# Patient Record
Sex: FEMALE | Race: WHITE | NOT HISPANIC OR LATINO | Employment: FULL TIME | URBAN - METROPOLITAN AREA
[De-identification: names, ages, dates, MRNs, and addresses within clinical notes are randomized per-mention and may not be internally consistent; named-entity substitution may affect disease eponyms.]

---

## 2017-01-12 ENCOUNTER — OFFICE VISIT (OUTPATIENT)
Dept: ENDOCRINOLOGY | Age: 57
End: 2017-01-12

## 2017-01-12 VITALS
HEIGHT: 69 IN | RESPIRATION RATE: 16 BRPM | BODY MASS INDEX: 30.13 KG/M2 | WEIGHT: 203.4 LBS | SYSTOLIC BLOOD PRESSURE: 122 MMHG | DIASTOLIC BLOOD PRESSURE: 78 MMHG

## 2017-01-12 DIAGNOSIS — E55.9 VITAMIN D DEFICIENCY: ICD-10-CM

## 2017-01-12 DIAGNOSIS — E03.9 ACQUIRED HYPOTHYROIDISM: ICD-10-CM

## 2017-01-12 DIAGNOSIS — B35.1 ONYCHOMYCOSIS: ICD-10-CM

## 2017-01-12 DIAGNOSIS — E78.5 DYSLIPIDEMIA: ICD-10-CM

## 2017-01-12 DIAGNOSIS — E11.69 TYPE 2 DIABETES MELLITUS WITH OTHER SPECIFIED COMPLICATION (HCC): Primary | ICD-10-CM

## 2017-01-12 DIAGNOSIS — E79.0 HYPERURICEMIA: ICD-10-CM

## 2017-01-12 PROCEDURE — 99215 OFFICE O/P EST HI 40 MIN: CPT | Performed by: INTERNAL MEDICINE

## 2017-01-12 RX ORDER — FENOFIBRATE 90 MG/1
CAPSULE ORAL
Qty: 30 CAPSULE | Refills: 11 | Status: SHIPPED | OUTPATIENT
Start: 2017-01-12 | End: 2017-01-17 | Stop reason: SINTOL

## 2017-01-12 RX ORDER — COLESEVELAM HYDROCHLORIDE 3.75 G/1
3.75 POWDER, FOR SUSPENSION ORAL DAILY
Qty: 90 EACH | Refills: 3 | Status: SHIPPED | OUTPATIENT
Start: 2017-01-12 | End: 2017-01-16 | Stop reason: SINTOL

## 2017-01-12 RX ORDER — ALLOPURINOL 100 MG/1
200 TABLET ORAL DAILY
Qty: 180 TABLET | Refills: 3 | Status: SHIPPED | OUTPATIENT
Start: 2017-01-12 | End: 2018-02-26 | Stop reason: SDUPTHER

## 2017-01-12 NOTE — LETTER
January 12, 2017     No Known Provider  [facility] [address]    Patient: Shanika Velez   YOB: 1960   Date of Visit: 1/12/2017       Dear  Provider:    Shanika Velez was in my office today. Below are the relevant portions of my assessment and plan of care.      Diagnoses and all orders for this visit:    Type 2 diabetes mellitus with other specified complication  -     T3, Free; Future  -     T4 & TSH ([facility]); Future  -     T4, Free; Future  -     Uric Acid; Future  -     Vitamin D 25 Hydroxy; Future  -     Comprehensive Metabolic Panel; Future  -     C-Peptide; Future  -     Hemoglobin A1c; Future  -     Lipid Panel; Future  -     MicroAlbumin, Urine, Random; Future  -     Ambulatory Referral to Nutrition Services  -     Ambulatory Referral to Podiatry  -     Duplex Carotid - Left Ultrasound CAR; Future  -     Duplex Carotid - Right Ultrasound CAR; Future    Acquired hypothyroidism  -     T3, Free; Future  -     T4 & TSH ([facility]); Future  -     T4, Free; Future  -     Uric Acid; Future  -     Vitamin D 25 Hydroxy; Future  -     Comprehensive Metabolic Panel; Future  -     C-Peptide; Future  -     Hemoglobin A1c; Future  -     Lipid Panel; Future  -     MicroAlbumin, Urine, Random; Future  -     allopurinol (ZYLOPRIM) 100 MG tablet; Take 2 tablets by mouth Daily.    Vitamin D deficiency  -     T3, Free; Future  -     T4 & TSH ([facility]); Future  -     T4, Free; Future  -     Uric Acid; Future  -     Vitamin D 25 Hydroxy; Future  -     Comprehensive Metabolic Panel; Future  -     C-Peptide; Future  -     Hemoglobin A1c; Future  -     Lipid Panel; Future  -     MicroAlbumin, Urine, Random; Future  -     allopurinol (ZYLOPRIM) 100 MG tablet; Take 2 tablets by mouth Daily.    Dyslipidemia  -     T3, Free; Future  -     T4 & TSH ([facility]); Future  -     T4, Free; Future  -     Uric Acid; Future  -     Vitamin D 25 Hydroxy; Future  -     Comprehensive Metabolic Panel; Future  -      C-Peptide; Future  -     Hemoglobin A1c; Future  -     Lipid Panel; Future  -     MicroAlbumin, Urine, Random; Future  -     Duplex Carotid - Left Ultrasound CAR; Future  -     Duplex Carotid - Right Ultrasound CAR; Future    Hyperuricemia  -     T3, Free; Future  -     T4 & TSH ([facility]); Future  -     T4, Free; Future  -     Uric Acid; Future  -     Vitamin D 25 Hydroxy; Future  -     Comprehensive Metabolic Panel; Future  -     C-Peptide; Future  -     Hemoglobin A1c; Future  -     Lipid Panel; Future  -     MicroAlbumin, Urine, Random; Future    Onychomycosis  -     Ambulatory Referral to Podiatry    Other orders  -     Fenofibrate Micronized (ANTARA) 90 MG capsule; 1 capsule by mouth daily  -     colesevelam (WELCHOL) 3.75 G pack pack; Take 1 packet by mouth Daily.                  If you have questions, please do not hesitate to call me. I look forward to following Shanika along with you.         Sincerely,        Parth Jo MD        CC: No Recipients

## 2017-01-12 NOTE — PROGRESS NOTES
Subjective   Shanika Velez is a 56 y.o. female seen for follow up for DM2, hypothyroidism, dyslipidemia, vit d deficiency, lab review. She is checking her BG twice a day. No BG readings. She would like to see a nutritionist to discuss a diet plan.     History of Present Illness this is a 56-year-old female known patient with type II diabetes hypertension dyslipidemia and hypothyroidism with vitamin D deficiency.  Over the last 6 months she has had no significant health problems for which to go to the emergency room or hospital.  She is frustrated by virtue of the fact that between her diabetes and the her diverticulitis she is very confused about the choice of foods and is requesting to have a meeting with a nutritionist.  Additionally her sister who also had diabetes recently  and they found near total carotid artery block on one side and total block on the other side.  She is very concerned about this as her brother who also is alive has carotid artery insufficiency.    The following portions of the patient's history were reviewed and updated as appropriate: allergies, current medications, past family history, past medical history, past social history, past surgical history and problem list.    Review of Systems   Constitutional: Negative.  Negative for fatigue.   HENT: Negative.    Eyes: Negative.    Respiratory: Negative.    Cardiovascular: Negative.  Negative for chest pain.   Gastrointestinal: Negative.    Endocrine: Positive for polydipsia and polyuria.   Genitourinary: Negative.    Musculoskeletal: Negative.    Skin: Negative.  Negative for pallor.   Allergic/Immunologic: Negative.    Neurological: Negative.  Negative for dizziness, tremors, seizures, speech difficulty, weakness and headaches.   Hematological: Negative.    Psychiatric/Behavioral: Negative.  Negative for confusion. The patient is not nervous/anxious.        Objective   Physical Exam   Constitutional: She is oriented to person, place, and  time. She appears well-developed and well-nourished. No distress.   HENT:   Head: Normocephalic and atraumatic.   Right Ear: External ear normal.   Left Ear: External ear normal.   Nose: Nose normal.   Mouth/Throat: Oropharynx is clear and moist. No oropharyngeal exudate.   Eyes: EOM are normal. Pupils are equal, round, and reactive to light. Right eye exhibits no discharge. Left eye exhibits no discharge. No scleral icterus.   Neck: Trachea normal, normal range of motion and full passive range of motion without pain. Neck supple. No JVD present. No tracheal tenderness present. Carotid bruit is not present. No tracheal deviation, no edema and no erythema present. No thyroid mass and no thyromegaly present.   Cardiovascular: Normal rate, regular rhythm, normal heart sounds and intact distal pulses.  Exam reveals no gallop and no friction rub.    No murmur heard.  Pulmonary/Chest: Effort normal and breath sounds normal. No stridor. No respiratory distress. She has no wheezes. She has no rales. She exhibits no tenderness.   Abdominal: Soft. Bowel sounds are normal. She exhibits no distension and no mass. There is no tenderness. There is no rebound and no guarding. No hernia.   Musculoskeletal: Normal range of motion. She exhibits no edema or deformity.   Bilateral bunions in both feet.  Also there is a significant peak toenail fungus infection and deformity.   Lymphadenopathy:     She has no cervical adenopathy.   Neurological: She is alert and oriented to person, place, and time. She has normal reflexes. She displays normal reflexes. No cranial nerve deficit. She exhibits normal muscle tone. Coordination normal.   Skin: Skin is warm and dry. No rash noted. She is not diaphoretic. No erythema. No pallor.   Psychiatric: She has a normal mood and affect. Her behavior is normal. Judgment and thought content normal.   Nursing note and vitals reviewed.        Assessment/Plan   Diagnoses and all orders for this  visit:    Type 2 diabetes mellitus with other specified complication  -     T3, Free; Future  -     T4 & TSH ([facility]); Future  -     T4, Free; Future  -     Uric Acid; Future  -     Vitamin D 25 Hydroxy; Future  -     Comprehensive Metabolic Panel; Future  -     C-Peptide; Future  -     Hemoglobin A1c; Future  -     Lipid Panel; Future  -     MicroAlbumin, Urine, Random; Future  -     Ambulatory Referral to Nutrition Services  -     Ambulatory Referral to Podiatry  -     Duplex Carotid - Left Ultrasound CAR; Future  -     Duplex Carotid - Right Ultrasound CAR; Future    Acquired hypothyroidism  -     T3, Free; Future  -     T4 & TSH ([facility]); Future  -     T4, Free; Future  -     Uric Acid; Future  -     Vitamin D 25 Hydroxy; Future  -     Comprehensive Metabolic Panel; Future  -     C-Peptide; Future  -     Hemoglobin A1c; Future  -     Lipid Panel; Future  -     MicroAlbumin, Urine, Random; Future  -     allopurinol (ZYLOPRIM) 100 MG tablet; Take 2 tablets by mouth Daily.    Vitamin D deficiency  -     T3, Free; Future  -     T4 & TSH ([facility]); Future  -     T4, Free; Future  -     Uric Acid; Future  -     Vitamin D 25 Hydroxy; Future  -     Comprehensive Metabolic Panel; Future  -     C-Peptide; Future  -     Hemoglobin A1c; Future  -     Lipid Panel; Future  -     MicroAlbumin, Urine, Random; Future  -     allopurinol (ZYLOPRIM) 100 MG tablet; Take 2 tablets by mouth Daily.    Dyslipidemia  -     T3, Free; Future  -     T4 & TSH ([facility]); Future  -     T4, Free; Future  -     Uric Acid; Future  -     Vitamin D 25 Hydroxy; Future  -     Comprehensive Metabolic Panel; Future  -     C-Peptide; Future  -     Hemoglobin A1c; Future  -     Lipid Panel; Future  -     MicroAlbumin, Urine, Random; Future  -     Duplex Carotid - Left Ultrasound CAR; Future  -     Duplex Carotid - Right Ultrasound CAR; Future    Hyperuricemia  -     T3, Free; Future  -     T4 & TSH ([facility]); Future  -     T4, Free; Future  -     Uric  Acid; Future  -     Vitamin D 25 Hydroxy; Future  -     Comprehensive Metabolic Panel; Future  -     C-Peptide; Future  -     Hemoglobin A1c; Future  -     Lipid Panel; Future  -     MicroAlbumin, Urine, Random; Future    Onychomycosis  -     Ambulatory Referral to Podiatry    Other orders  -     Fenofibrate Micronized (ANTARA) 90 MG capsule; 1 capsule by mouth daily  -     colesevelam (WELCHOL) 3.75 G pack pack; Take 1 packet by mouth Daily.               In summary I saw and examined this 56-year-old female for above-mentioned problems.  I reviewed her laboratory evaluation of 12/22/2016 and provided her with a hard copy of it.  She is overall clinically and metabolically stable however her triglycerides are elevated and for that I am starting her on Antara 90 mg daily and her uric acid is is still slightly elevated and we will go ahead and increase her allopurinol to 200 mg daily.  We will also switch her from cholesterol 2 WelChol for better lipid control as well as diabetic control as well.  We will make a referral to a nutritionist as well as podiatrist and also arrange for her to have carotid ultrasound studies.  She will see Ms. Lexi Muñiz in 4 months and myself in 8 months or sooner if needed with laboratory evaluation prior to each office visit.  This office visit including 50% of the time being spent on patient and counseling exceeded 40 minutes.

## 2017-01-12 NOTE — LETTER
January 12, 2017     No Known Provider  [facility] [address]    Patient: Shanika Velez   YOB: 1960   Date of Visit: 1/12/2017       Dear  Provider:    Thank you for referring Shanika Velez to me for evaluation. Below are the relevant portions of my assessment and plan of care.      Diagnoses and all orders for this visit:    Type 2 diabetes mellitus with other specified complication  -     T3, Free; Future  -     T4 & TSH ([facility]); Future  -     T4, Free; Future  -     Uric Acid; Future  -     Vitamin D 25 Hydroxy; Future  -     Comprehensive Metabolic Panel; Future  -     C-Peptide; Future  -     Hemoglobin A1c; Future  -     Lipid Panel; Future  -     MicroAlbumin, Urine, Random; Future  -     Ambulatory Referral to Nutrition Services  -     Ambulatory Referral to Podiatry  -     Duplex Carotid - Left Ultrasound CAR; Future  -     Duplex Carotid - Right Ultrasound CAR; Future    Acquired hypothyroidism  -     T3, Free; Future  -     T4 & TSH ([facility]); Future  -     T4, Free; Future  -     Uric Acid; Future  -     Vitamin D 25 Hydroxy; Future  -     Comprehensive Metabolic Panel; Future  -     C-Peptide; Future  -     Hemoglobin A1c; Future  -     Lipid Panel; Future  -     MicroAlbumin, Urine, Random; Future  -     allopurinol (ZYLOPRIM) 100 MG tablet; Take 2 tablets by mouth Daily.    Vitamin D deficiency  -     T3, Free; Future  -     T4 & TSH ([facility]); Future  -     T4, Free; Future  -     Uric Acid; Future  -     Vitamin D 25 Hydroxy; Future  -     Comprehensive Metabolic Panel; Future  -     C-Peptide; Future  -     Hemoglobin A1c; Future  -     Lipid Panel; Future  -     MicroAlbumin, Urine, Random; Future  -     allopurinol (ZYLOPRIM) 100 MG tablet; Take 2 tablets by mouth Daily.    Dyslipidemia  -     T3, Free; Future  -     T4 & TSH ([facility]); Future  -     T4, Free; Future  -     Uric Acid; Future  -     Vitamin D 25 Hydroxy; Future  -     Comprehensive Metabolic  Panel; Future  -     C-Peptide; Future  -     Hemoglobin A1c; Future  -     Lipid Panel; Future  -     MicroAlbumin, Urine, Random; Future  -     Duplex Carotid - Left Ultrasound CAR; Future  -     Duplex Carotid - Right Ultrasound CAR; Future    Hyperuricemia  -     T3, Free; Future  -     T4 & TSH ([facility]); Future  -     T4, Free; Future  -     Uric Acid; Future  -     Vitamin D 25 Hydroxy; Future  -     Comprehensive Metabolic Panel; Future  -     C-Peptide; Future  -     Hemoglobin A1c; Future  -     Lipid Panel; Future  -     MicroAlbumin, Urine, Random; Future    Onychomycosis  -     Ambulatory Referral to Podiatry    Other orders  -     Fenofibrate Micronized (ANTARA) 90 MG capsule; 1 capsule by mouth daily  -     colesevelam (WELCHOL) 3.75 G pack pack; Take 1 packet by mouth Daily.                          If you have questions, please do not hesitate to call me. I look forward to following Shanika along with you.         Sincerely,        Parth Jo MD        CC: No Recipients

## 2017-01-12 NOTE — MR AVS SNAPSHOT
Shanika Velez   1/12/2017 9:20 AM   Office Visit    Dept Phone:  [phone]   Encounter #:  [id number]    Provider:  Parth Jo MD   Department:  [facility] ENDOCRINOLOGY                Your Full Care Plan              Today's Medication Changes          These changes are accurate as of: 1/12/17 10:09 AM.  If you have any questions, ask your nurse or doctor.               New Medication(s)Ordered:     colesevelam 3.75 G pack pack   Commonly known as:  WELCHOL   Take 1 packet by mouth Daily.   Started by:  Parth Jo MD       Fenofibrate Micronized 90 MG capsule   Commonly known as:  ANTARA   1 capsule by mouth daily   Started by:  Parth Jo MD         Medication(s)that have changed:     allopurinol 100 MG tablet   Commonly known as:  ZYLOPRIM   Take 2 tablets by mouth Daily.   What changed:  how much to take   Changed by:  Parth Jo MD         Stop taking medication(s)listed here:     colestipol 1 G tablet   Commonly known as:  COLESTID   Stopped by:  Parth Jo MD                Where to Get Your Medications      These medications were sent to [id number] - [place], [place] - [address] AT [facility] - [phone] [facility] [phone]   [address] [place] [address]     Phone:  [phone]     allopurinol 100 MG tablet    colesevelam 3.75 G pack pack    Fenofibrate Micronized 90 MG capsule                  Your Updated Medication List          This list is accurate as of: 1/12/17 10:09 AM.  Always use your most recent med list.                ACCU-CHEK FASTCLIX LANCETS misc   Test 2 times daily       ALLEGRA ALLERGY 180 MG tablet   Generic drug:  fexofenadine       allopurinol 100 MG tablet   Commonly known as:  ZYLOPRIM   Take 2 tablets by mouth Daily.       colesevelam 3.75 G pack pack   Commonly known as:  WELCHOL   Take 1 packet by mouth Daily.       Fenofibrate Micronized 90 MG capsule   Commonly  known as:  ANTARA   1 capsule by mouth daily       fluticasone 50 MCG/ACT nasal spray   Commonly known as:  FLONASE       glucose blood test strip   Commonly known as:  ACCU-CHEK JACKIE   Test twice daily       meloxicam 7.5 MG tablet   Commonly known as:  MOBIC       metFORMIN 1000 MG tablet   Commonly known as:  GLUCOPHAGE       * montelukast 10 MG tablet   Commonly known as:  SINGULAIR       * montelukast 10 MG tablet   Commonly known as:  SINGULAIR   TAKE 1 TABLET IN THE EVENING.       pantoprazole 40 MG EC tablet   Commonly known as:  PROTONIX       * SYNTHROID 112 MCG tablet   Generic drug:  levothyroxine       * SYNTHROID 112 MCG tablet   Generic drug:  levothyroxine   One tablet every day       * ergocalciferol 32129 UNITS capsule   Commonly known as:  ERGOCALCIFEROL       * vitamin D 42966 UNITS capsule capsule   Commonly known as:  ERGOCALCIFEROL   TAKE ONE CAPSULE 3 TIMES WEEKLY       * Notice:  This list has 6 medication(s) that are the same as other medications prescribed for you. Read the directions carefully, and ask your doctor or other care provider to review them with you.            We Performed the Following     Ambulatory Referral to Nutrition Services     Ambulatory Referral to Podiatry       You Were Diagnosed With        Codes Comments    Type 2 diabetes mellitus with other specified complication    -  Primary ICD-10-CM: E11.69  ICD-9-CM: 250.80     Acquired hypothyroidism     ICD-10-CM: E03.9  ICD-9-CM: 244.9     Vitamin D deficiency     ICD-10-CM: E55.9  ICD-9-CM: 268.9     Dyslipidemia     ICD-10-CM: E78.5  ICD-9-CM: 272.4     Hyperuricemia     ICD-10-CM: E79.0  ICD-9-CM: 790.6     Onychomycosis     ICD-10-CM: B35.1  ICD-9-CM: 110.1       Instructions     None    Patient Instructions History      Upcoming Appointments     Visit Type Date Time Department    OFFICE VISIT 1/12/2017  9:20 AM [facility]    [facility] 5/2/2017  3:30 PM [facility]    OFFICE VISIT 5/16/2017  3:40 PM  [facility]    [facility] 9/5/2017  3:30 PM [facility]    OFFICE VISIT 9/18/2017  2:20 PM [facility]      MyChart Signup     Our records indicate that you have an active [facility] [employer] account.    You can view your After Visit Summary by going to [email or web address] and logging in with your [employer] username and password.  If you don't have a [employer] username and password but a parent or guardian has access to your record, the parent or guardian should login with their own [employer] username and password and access your record to view the After Visit Summary.    If you have questions, you can email [email or web address] or call [phone] to talk to our [employer] staff.  Remember, [employer] is NOT to be used for urgent needs.  For medical emergencies, dial 911.               Other Info from Your Visit           Your Appointments     May 02, 2017  3:30 PM EDT   [facility] with [facility], [facility]   [facility] ENDOCRINOLOGY (--)    [address] [place], [place] [id number]   [phone]            May 16, 2017  3:40 PM EDT   Office Visit with YUE Davis   [facility] ENDOCRINOLOGY (--)    [address] [place], [place] [id number]   [phone]           Arrive 15 minutes prior to appointment.            Sep 05, 2017  3:30 PM EDT   [facility], [facility]   [facility] ENDOCRINOLOGY (--)    [address] [place] [address]   [phone]            Sep 18, 2017  2:20 PM EDT   Office Visit with Parth Jo MD   [facility] ENDOCRINOLOGY (--)    [address] [place], [place] [address]   [phone]           Arrive 15 minutes prior to appointment.              Allergies     Codeine      Fluoxetine Hcl      Penicillins      Tetracycline      Tetracyclines & Related        Vital Signs     Blood Pressure Respirations Height Weight  "Body Mass Index Smoking Status    122/78 16 69\" (175.3 cm) 203 lb 6.4 oz (92.3 kg) 30.04 kg/m2 Former Smoker      Problems and Diagnoses Noted     Dyslipidemia    Elevated blood uric acid level    Underactive thyroid    Type 2 diabetes    Vitamin D deficiency    Onychomycosis            "

## 2017-01-13 DIAGNOSIS — G45.1 CAROTID INSUFFICIENCY: Primary | ICD-10-CM

## 2017-01-14 DIAGNOSIS — E03.9 ACQUIRED HYPOTHYROIDISM: ICD-10-CM

## 2017-01-14 DIAGNOSIS — E55.9 VITAMIN D DEFICIENCY: ICD-10-CM

## 2017-01-16 RX ORDER — PANTOPRAZOLE SODIUM 40 MG/1
TABLET, DELAYED RELEASE ORAL
Qty: 90 TABLET | Refills: 3 | Status: SHIPPED | OUTPATIENT
Start: 2017-01-16 | End: 2017-12-30 | Stop reason: SDUPTHER

## 2017-01-16 RX ORDER — EZETIMIBE 10 MG/1
10 TABLET ORAL DAILY
Qty: 30 TABLET | Refills: 11 | Status: SHIPPED | OUTPATIENT
Start: 2017-01-16 | End: 2017-04-12 | Stop reason: SDUPTHER

## 2017-01-16 RX ORDER — MELOXICAM 7.5 MG/1
TABLET ORAL
Qty: 60 TABLET | Refills: 3 | Status: SHIPPED | OUTPATIENT
Start: 2017-01-16 | End: 2017-09-09 | Stop reason: SDUPTHER

## 2017-01-17 RX ORDER — ICOSAPENT ETHYL 1000 MG/1
4 CAPSULE ORAL DAILY
Qty: 120 CAPSULE | Refills: 5 | Status: SHIPPED | OUTPATIENT
Start: 2017-01-17 | End: 2017-07-27 | Stop reason: SDUPTHER

## 2017-01-19 ENCOUNTER — TELEPHONE (OUTPATIENT)
Dept: ENDOCRINOLOGY | Age: 57
End: 2017-01-19

## 2017-01-19 NOTE — TELEPHONE ENCOUNTER
----- Message from Parth Jo MD sent at 1/17/2017  4:47 PM EST -----  Contact: PATIENT  So I am going to discontinue Antara and I am going to start her on Vascepa capsules twice daily.  Her triglyceride was high and this is for that.  She is well come to  some samples and a co-pay reduction card.  ----- Message -----     From: Sandra Vigil MA     Sent: 1/17/2017   3:27 PM       To: Parth Jo MD    So should she be taking Antara along with with the Zetia and Colestipol. She also states that after starting the Antara last week she has been having some itching.   ----- Message -----     From: Parth Jo MD     Sent: 1/17/2017  10:06 AM       To: Sandra Vigil MA    It is okay.  But she also needs to take Zetia 10 mg a day because her high levels of cholesterol was when she was on colestipol.  ----- Message -----     From: Sandra Vigil MA     Sent: 1/16/2017  12:54 PM       To: Parth Jo MD     Patient was taking colestipol 1gm one tablet 3 times a day. She still has this medication at home and is wanting to go back to this before paying for a new medication. Is this ok?  ----- Message -----     From: Parth Jo MD     Sent: 1/16/2017  11:40 AM       To: Sandra Vigil MA    I went ahead and switched her to Zetia 10 mg daily.  ----- Message -----     From: Sandra Vigil MA     Sent: 1/16/2017  10:38 AM       To: Parth Jo MD        ----- Message -----     From: Devorah Toledo     Sent: 1/16/2017   9:48 AM       To: Sandra Vigil MA    CHANGED A MED AND MADE PT SICK CAN NOT TAKE    colesevelam (WELCHOL) 3.75 G pack pack    STARTED TAKING IT YESTERDAY 01/15/2017    PLEASE CALL PT            Left patient a voicemail to call our office 01/19/2017 8:10AM

## 2017-01-23 ENCOUNTER — HOSPITAL ENCOUNTER (OUTPATIENT)
Dept: CARDIOLOGY | Facility: HOSPITAL | Age: 57
Discharge: HOME | End: 2017-01-23
Attending: INTERNAL MEDICINE | Admitting: INTERNAL MEDICINE

## 2017-01-23 VITALS
BODY MASS INDEX: 30.16 KG/M2 | WEIGHT: 199 LBS | HEART RATE: 80 BPM | DIASTOLIC BLOOD PRESSURE: 66 MMHG | HEIGHT: 68 IN | SYSTOLIC BLOOD PRESSURE: 130 MMHG

## 2017-01-23 DIAGNOSIS — G45.1 CAROTID INSUFFICIENCY: ICD-10-CM

## 2017-01-23 LAB
BH CV VAS BP LEFT ARM: NORMAL MMHG
BH CV VAS BP RIGHT ARM: NORMAL MMHG
BH CV XLRA MEAS LEFT ICA/CCA RATIO: 1.25
BH CV XLRA MEAS LEFT MID CCA PSV: NORMAL CM/SEC
BH CV XLRA MEAS LEFT MID ICA PSV: NORMAL CM/SEC
BH CV XLRA MEAS LEFT PROX ECA PSV: NORMAL CM/SEC
BH CV XLRA MEAS RIGHT ICA/CCA RATIO: 1.34
BH CV XLRA MEAS RIGHT MID CCA PSV: NORMAL CM/SEC
BH CV XLRA MEAS RIGHT MID ICA PSV: NORMAL CM/SEC
BH CV XLRA MEAS RIGHT PROX ECA PSV: NORMAL CM/SEC

## 2017-01-23 PROCEDURE — 93799 UNLISTED CV SVC/PROCEDURE: CPT

## 2017-01-31 ENCOUNTER — OFFICE (OUTPATIENT)
Dept: URBAN - METROPOLITAN AREA CLINIC 75 | Facility: CLINIC | Age: 57
End: 2017-01-31

## 2017-02-07 ENCOUNTER — APPOINTMENT (OUTPATIENT)
Dept: DIABETES SERVICES | Facility: HOSPITAL | Age: 57
End: 2017-02-07
Attending: INTERNAL MEDICINE

## 2017-02-13 ENCOUNTER — OFFICE (OUTPATIENT)
Dept: URBAN - METROPOLITAN AREA CLINIC 2 | Facility: CLINIC | Age: 57
End: 2017-02-13

## 2017-02-13 VITALS
HEIGHT: 68 IN | SYSTOLIC BLOOD PRESSURE: 128 MMHG | DIASTOLIC BLOOD PRESSURE: 80 MMHG | WEIGHT: 203 LBS | HEART RATE: 72 BPM

## 2017-02-13 DIAGNOSIS — K21.9 GASTRO-ESOPHAGEAL REFLUX DISEASE WITHOUT ESOPHAGITIS: ICD-10-CM

## 2017-02-13 DIAGNOSIS — K91.5 POSTCHOLECYSTECTOMY SYNDROME: ICD-10-CM

## 2017-02-13 PROCEDURE — 99214 OFFICE O/P EST MOD 30 MIN: CPT | Performed by: INTERNAL MEDICINE

## 2017-03-21 DIAGNOSIS — E03.9 ACQUIRED HYPOTHYROIDISM: ICD-10-CM

## 2017-03-21 DIAGNOSIS — E55.9 VITAMIN D DEFICIENCY: ICD-10-CM

## 2017-04-12 RX ORDER — EZETIMIBE 10 MG/1
10 TABLET ORAL DAILY
Qty: 90 TABLET | Refills: 3 | Status: SHIPPED | OUTPATIENT
Start: 2017-04-12 | End: 2017-07-27

## 2017-04-25 DIAGNOSIS — E03.9 ACQUIRED HYPOTHYROIDISM: ICD-10-CM

## 2017-04-25 DIAGNOSIS — E11.69 TYPE 2 DIABETES MELLITUS WITH OTHER SPECIFIED COMPLICATION (HCC): ICD-10-CM

## 2017-04-25 DIAGNOSIS — E78.5 DYSLIPIDEMIA: ICD-10-CM

## 2017-04-25 DIAGNOSIS — E79.0 HYPERURICEMIA: ICD-10-CM

## 2017-04-25 DIAGNOSIS — E55.9 VITAMIN D DEFICIENCY: Primary | ICD-10-CM

## 2017-05-02 ENCOUNTER — LAB (OUTPATIENT)
Dept: ENDOCRINOLOGY | Age: 57
End: 2017-05-02

## 2017-05-02 DIAGNOSIS — E78.5 DYSLIPIDEMIA: ICD-10-CM

## 2017-05-02 DIAGNOSIS — E79.0 HYPERURICEMIA: ICD-10-CM

## 2017-05-02 DIAGNOSIS — E03.9 ACQUIRED HYPOTHYROIDISM: ICD-10-CM

## 2017-05-02 DIAGNOSIS — E11.69 TYPE 2 DIABETES MELLITUS WITH OTHER SPECIFIED COMPLICATION (HCC): ICD-10-CM

## 2017-05-02 DIAGNOSIS — E55.9 VITAMIN D DEFICIENCY: ICD-10-CM

## 2017-05-03 ENCOUNTER — RESULTS ENCOUNTER (OUTPATIENT)
Dept: ENDOCRINOLOGY | Age: 57
End: 2017-05-03

## 2017-05-03 DIAGNOSIS — E79.0 HYPERURICEMIA: ICD-10-CM

## 2017-05-03 DIAGNOSIS — E78.5 DYSLIPIDEMIA: ICD-10-CM

## 2017-05-03 DIAGNOSIS — E55.9 VITAMIN D DEFICIENCY: ICD-10-CM

## 2017-05-03 DIAGNOSIS — E11.69 TYPE 2 DIABETES MELLITUS WITH OTHER SPECIFIED COMPLICATION (HCC): ICD-10-CM

## 2017-05-03 DIAGNOSIS — E03.9 ACQUIRED HYPOTHYROIDISM: ICD-10-CM

## 2017-05-08 LAB
25(OH)D3+25(OH)D2 SERPL-MCNC: 35.5 NG/ML (ref 30–100)
ALBUMIN SERPL-MCNC: 4.4 G/DL (ref 3.5–5.2)
ALBUMIN/GLOB SERPL: 1.7 G/DL
ALP SERPL-CCNC: 65 U/L (ref 39–117)
ALT SERPL-CCNC: 25 U/L (ref 1–33)
AST SERPL-CCNC: 47 U/L (ref 1–32)
BILIRUB SERPL-MCNC: 0.3 MG/DL (ref 0.1–1.2)
BUN SERPL-MCNC: 14 MG/DL (ref 6–20)
BUN/CREAT SERPL: 19.4 (ref 7–25)
C PEPTIDE SERPL-MCNC: 7.9 NG/ML (ref 1.1–4.4)
CALCIUM SERPL-MCNC: 9.7 MG/DL (ref 8.6–10.5)
CHLORIDE SERPL-SCNC: 103 MMOL/L (ref 98–107)
CHOLEST SERPL-MCNC: 173 MG/DL (ref 0–200)
CO2 SERPL-SCNC: 22.1 MMOL/L (ref 22–29)
CREAT SERPL-MCNC: 0.72 MG/DL (ref 0.57–1)
FT4I SERPL CALC-MCNC: 2.8 (ref 1.2–4.9)
GLOBULIN SER CALC-MCNC: 2.6 GM/DL
GLUCOSE SERPL-MCNC: 99 MG/DL (ref 65–99)
HBA1C MFR BLD: 6 % (ref 4.8–5.6)
HDLC SERPL-MCNC: 37 MG/DL (ref 40–60)
LDLC SERPL CALC-MCNC: 85 MG/DL (ref 0–100)
MICROALBUMIN UR-MCNC: 6.5 UG/ML
POTASSIUM SERPL-SCNC: 4.3 MMOL/L (ref 3.5–5.2)
PROT SERPL-MCNC: 7 G/DL (ref 6–8.5)
SODIUM SERPL-SCNC: 142 MMOL/L (ref 136–145)
T3FREE SERPL-MCNC: 2.6 PG/ML (ref 2–4.4)
T3RU NFR SERPL: 24 % (ref 24–39)
T4 FREE SERPL-MCNC: 1.54 NG/DL (ref 0.93–1.7)
T4 SERPL-MCNC: 11.6 UG/DL (ref 4.5–12)
THYROGLOB AB SERPL-ACNC: 6 IU/ML
THYROGLOB SERPL-MCNC: 3.4 NG/ML
THYROGLOB SERPL-MCNC: ABNORMAL NG/ML
TRIGL SERPL-MCNC: 255 MG/DL (ref 0–150)
TSH SERPL DL<=0.005 MIU/L-ACNC: 0.49 UIU/ML (ref 0.45–4.5)
URATE SERPL-MCNC: 5.6 MG/DL (ref 2.4–5.7)
VLDLC SERPL CALC-MCNC: 51 MG/DL (ref 5–40)

## 2017-05-10 ENCOUNTER — TELEPHONE (OUTPATIENT)
Dept: ENDOCRINOLOGY | Age: 57
End: 2017-05-10

## 2017-05-15 RX ORDER — LEVOTHYROXINE SODIUM 112 MCG
TABLET ORAL
Qty: 90 TABLET | Refills: 3 | Status: SHIPPED | OUTPATIENT
Start: 2017-05-15 | End: 2017-07-27 | Stop reason: SDUPTHER

## 2017-06-12 RX ORDER — ERGOCALCIFEROL 1.25 MG/1
CAPSULE ORAL
Qty: 36 CAPSULE | Refills: 3 | Status: SHIPPED | OUTPATIENT
Start: 2017-06-12 | End: 2018-02-26 | Stop reason: SDUPTHER

## 2017-06-12 RX ORDER — MONTELUKAST SODIUM 10 MG/1
10 TABLET ORAL NIGHTLY
Qty: 90 TABLET | Refills: 0 | Status: SHIPPED | OUTPATIENT
Start: 2017-06-12 | End: 2017-06-14 | Stop reason: SDUPTHER

## 2017-06-14 RX ORDER — MONTELUKAST SODIUM 10 MG/1
10 TABLET ORAL NIGHTLY
Qty: 90 TABLET | Refills: 0 | Status: SHIPPED | OUTPATIENT
Start: 2017-06-14 | End: 2017-12-10 | Stop reason: SDUPTHER

## 2017-07-27 ENCOUNTER — OFFICE VISIT (OUTPATIENT)
Dept: ENDOCRINOLOGY | Age: 57
End: 2017-07-27

## 2017-07-27 VITALS
BODY MASS INDEX: 30.89 KG/M2 | WEIGHT: 203.8 LBS | DIASTOLIC BLOOD PRESSURE: 72 MMHG | HEIGHT: 68 IN | SYSTOLIC BLOOD PRESSURE: 122 MMHG

## 2017-07-27 DIAGNOSIS — E78.5 HYPERLIPIDEMIA, UNSPECIFIED HYPERLIPIDEMIA TYPE: ICD-10-CM

## 2017-07-27 DIAGNOSIS — E03.9 ACQUIRED HYPOTHYROIDISM: Primary | ICD-10-CM

## 2017-07-27 DIAGNOSIS — E79.0 HYPERURICEMIA: ICD-10-CM

## 2017-07-27 DIAGNOSIS — E78.5 DYSLIPIDEMIA: ICD-10-CM

## 2017-07-27 DIAGNOSIS — E55.9 VITAMIN D DEFICIENCY: ICD-10-CM

## 2017-07-27 DIAGNOSIS — E11.8 TYPE 2 DIABETES MELLITUS WITH COMPLICATION, WITH LONG-TERM CURRENT USE OF INSULIN (HCC): ICD-10-CM

## 2017-07-27 DIAGNOSIS — Z79.4 TYPE 2 DIABETES MELLITUS WITH COMPLICATION, WITH LONG-TERM CURRENT USE OF INSULIN (HCC): ICD-10-CM

## 2017-07-27 PROBLEM — K52.9 INFLAMMATORY BOWEL DISEASE: Status: ACTIVE | Noted: 2017-07-27

## 2017-07-27 PROBLEM — K57.90 DIVERTICULOSIS: Status: ACTIVE | Noted: 2017-07-27

## 2017-07-27 PROCEDURE — 99214 OFFICE O/P EST MOD 30 MIN: CPT | Performed by: NURSE PRACTITIONER

## 2017-07-27 RX ORDER — ATORVASTATIN CALCIUM 10 MG/1
10 TABLET, FILM COATED ORAL DAILY
Qty: 30 TABLET | Refills: 5 | Status: SHIPPED | OUTPATIENT
Start: 2017-07-27 | End: 2017-10-16 | Stop reason: SDUPTHER

## 2017-07-27 RX ORDER — MONTELUKAST SODIUM 4 MG/1
1 TABLET, CHEWABLE ORAL 4 TIMES DAILY
Refills: 2 | COMMUNITY
Start: 2017-07-21 | End: 2022-03-17

## 2017-07-27 RX ORDER — ICOSAPENT ETHYL 1000 MG/1
2 CAPSULE ORAL 2 TIMES DAILY WITH MEALS
Qty: 120 CAPSULE | Refills: 1 | Status: SHIPPED | OUTPATIENT
Start: 2017-07-27 | End: 2017-11-07 | Stop reason: SDUPTHER

## 2017-07-27 NOTE — PATIENT INSTRUCTIONS
vascepa 1 gram 2 pills twice daily with food if tolerated  Metformin 1000 mg once daily with food  Continue all other meds  Atorvastatin 10 mg once daily

## 2017-07-27 NOTE — PROGRESS NOTES
"Subjective   Shanika Velez is a 56 y.o. female is here today for follow-up.  Chief Complaint   Patient presents with   • Diabetes     labs from May, testing BG 3 times daily, pt did not bring meter   • Hypothyroidism     pt is not taking zetia   • Vitamin D Deficiency   • hyperuricemia   • dyslipidemia     /72  Ht 68\" (172.7 cm)  Wt 203 lb 12.8 oz (92.4 kg)  BMI 30.99 kg/m2  Current Outpatient Prescriptions on File Prior to Visit   Medication Sig   • ACCU-CHEK FASTCLIX LANCETS misc Test 2 times daily   • allopurinol (ZYLOPRIM) 100 MG tablet Take 2 tablets by mouth Daily. (Patient taking differently: Take 100 mg by mouth Daily.)   • fexofenadine (ALLEGRA ALLERGY) 180 MG tablet Take 180 mg by mouth As Needed.   • fluticasone (FLONASE) 50 MCG/ACT nasal spray into each nostril daily.   • glucose blood (ACCU-CHEK JACKIE) test strip Test twice daily   • meloxicam (MOBIC) 7.5 MG tablet TAKE 1 TO 2 TABLETS DAILY WITH FOOD.   • metFORMIN (GLUCOPHAGE) 1000 MG tablet TAKE 1 TABLET EVERY 12 HOURS (Patient taking differently: TAKE 1 TABLET DAILY)   • montelukast (SINGULAIR) 10 MG tablet Take 1 tablet by mouth Every Night.   • pantoprazole (PROTONIX) 40 MG EC tablet TAKE 1 TABLET DAILY   • SYNTHROID 112 MCG tablet One tablet every day   • VASCEPA 1 G capsule capsule Take 4 g by mouth Daily. (Patient taking differently: Take 1 capsule by mouth Daily.)   • vitamin D (ERGOCALCIFEROL) 82212 UNITS capsule capsule TAKE ONE CAPSULE 3 TIMES WEEKLY   • [DISCONTINUED] ergocalciferol (ERGOCALCIFEROL) 93143 UNITS capsule Take 50,000 Units by mouth 3 (Three) Times a Week.   • ezetimibe (ZETIA) 10 MG tablet Take 1 tablet by mouth Daily.   • [DISCONTINUED] levothyroxine (SYNTHROID) 112 MCG tablet Take  by mouth daily.   • [DISCONTINUED] SYNTHROID 112 MCG tablet TAKE 1 TABLET BY MOUTH EVERY DAY     No current facility-administered medications on file prior to visit.      Family History   Problem Relation Age of Onset   • Heart disease " Other    • Hyperlipidemia Other    • Other Other      carotid artery disease   • Hypertension Other      Social History   Substance Use Topics   • Smoking status: Never Smoker   • Smokeless tobacco: None   • Alcohol use Defer     Allergies   Allergen Reactions   • Codeine    • Fluoxetine Hcl    • Penicillins    • Tetracycline    • Tetracyclines & Related          History of Present Illness  Encounter Diagnoses   Name Primary?   • Acquired hypothyroidism Yes   • Type 2 diabetes mellitus with complication, with long-term current use of insulin    • Vitamin D deficiency    • Hyperlipidemia, unspecified hyperlipidemia type    • Hyperuricemia    • Dyslipidemia    This is a 56-year-old female patient here today for routine follow-up visit.  She is being seen for the above-mentioned problems.  She had recent labs which were reviewed and she was provided a copy.  She was diagnosed with diverticulosis and states as a result her diet is extremely limited.  She has problems finding foods that she is able tolerate.  She is not taken her fish oil as prescribed.  She states her pharmacist told her she cannot take 4 g a day.  We discussed the correct dosing for vascepa witch is 4 g daily and she can put that into 2 g by mouth twice a day with food.  Her hemoglobin A1c reflects her diabetes is under good control.  She is currently not on statin therapy does have a significant family history of heart disease.  We discussed other options and have her on atorvastatin 10 mg once daily.  She states she never had a Zetia filled due to cost.  She states her primary care doctor has question while she has not been on statin therapy.  Currently her LDL cholesterol is satisfactory triglycerides are too high.      The following portions of the patient's history were reviewed and updated as appropriate: allergies, current medications, past family history, past medical history, past social history, past surgical history and problem  list.    Review of Systems   Constitutional: Negative for fatigue.   HENT: Negative for trouble swallowing.    Eyes: Negative for visual disturbance.   Respiratory: Negative for shortness of breath.    Cardiovascular: Negative for leg swelling.   Endocrine: Negative for polyphagia.   Skin: Negative for wound.   Neurological: Negative for numbness.       Objective   Physical Exam   Constitutional: She is oriented to person, place, and time. She appears well-developed and well-nourished. No distress.   HENT:   Head: Normocephalic and atraumatic.   Right Ear: External ear normal.   Left Ear: External ear normal.   Nose: Nose normal.   Mouth/Throat: Oropharynx is clear and moist. No oropharyngeal exudate.   Eyes: EOM are normal. Pupils are equal, round, and reactive to light. Right eye exhibits no discharge. Left eye exhibits no discharge.   Neck: Trachea normal, normal range of motion and full passive range of motion without pain. Neck supple. No tracheal tenderness present. Carotid bruit is not present. No tracheal deviation, no edema and no erythema present. No thyroid mass and no thyromegaly present.   Cardiovascular: Normal rate, regular rhythm, normal heart sounds and intact distal pulses.  Exam reveals no gallop and no friction rub.    No murmur heard.  Pulmonary/Chest: Effort normal and breath sounds normal. No stridor. No respiratory distress. She has no wheezes. She has no rales.   Abdominal: Soft. Bowel sounds are normal. She exhibits no distension.   Musculoskeletal: Normal range of motion. She exhibits no edema or deformity.   Lymphadenopathy:     She has no cervical adenopathy.   Neurological: She is alert and oriented to person, place, and time.   Skin: Skin is warm and dry. No rash noted. She is not diaphoretic. No erythema. No pallor.   Psychiatric: She has a normal mood and affect. Her behavior is normal. Judgment and thought content normal.   Nursing note and vitals reviewed.    Results for orders  placed or performed in visit on 05/02/17   Uric Acid   Result Value Ref Range    Uric Acid 5.6 2.4 - 5.7 mg/dL   Comprehensive Metabolic Panel   Result Value Ref Range    Glucose 99 65 - 99 mg/dL    BUN 14 6 - 20 mg/dL    Creatinine 0.72 0.57 - 1.00 mg/dL    eGFR Non African Am 83 >60 mL/min/1.73    eGFR African Am 101 >60 mL/min/1.73    BUN/Creatinine Ratio 19.4 7.0 - 25.0    Sodium 142 136 - 145 mmol/L    Potassium 4.3 3.5 - 5.2 mmol/L    Chloride 103 98 - 107 mmol/L    Total CO2 22.1 22.0 - 29.0 mmol/L    Calcium 9.7 8.6 - 10.5 mg/dL    Total Protein 7.0 6.0 - 8.5 g/dL    Albumin 4.40 3.50 - 5.20 g/dL    Globulin 2.6 gm/dL    A/G Ratio 1.7 g/dL    Total Bilirubin 0.3 0.1 - 1.2 mg/dL    Alkaline Phosphatase 65 39 - 117 U/L    AST (SGOT) 47 (H) 1 - 32 U/L    ALT (SGPT) 25 1 - 33 U/L   Lipid Panel   Result Value Ref Range    Total Cholesterol 173 0 - 200 mg/dL    Triglycerides 255 (H) 0 - 150 mg/dL    HDL Cholesterol 37 (L) 40 - 60 mg/dL    VLDL Cholesterol 51 (H) 5 - 40 mg/dL    LDL Cholesterol  85 0 - 100 mg/dL   MicroAlbumin, Urine, Random   Result Value Ref Range    Microalbumin, Urine 6.5 Not Estab. ug/mL   Vitamin D 25 Hydroxy   Result Value Ref Range    25 Hydroxy, Vitamin D 35.5 30.0 - 100.0 ng/mL   Hemoglobin A1c   Result Value Ref Range    Hemoglobin A1C 6.00 (H) 4.80 - 5.60 %   C-Peptide   Result Value Ref Range    C-Peptide 7.9 (H) 1.1 - 4.4 ng/mL   Thyroid Panel With TSH   Result Value Ref Range    TSH 0.494 0.450 - 4.500 uIU/mL    T4, Total 11.6 4.5 - 12.0 ug/dL    T3 Uptake 24 24 - 39 %    Free Thyroxine Index 2.8 1.2 - 4.9   T3, Free   Result Value Ref Range    T3, Free 2.6 2.0 - 4.4 pg/mL   T4, Free   Result Value Ref Range    Free T4 1.54 0.93 - 1.70 ng/dL   Comprehensive Thyroglobulin   Result Value Ref Range    Thyroglobulin Ab 6.0 (H) IU/mL    Thyroglobulin Comment ng/mL    Thyroglobulin (TG-DENNY) 3.4 ng/mL         Assessment/Plan   Shanika was seen today for diabetes, hypothyroidism, vitamin d  deficiency, hyperuricemia and dyslipidemia.    Diagnoses and all orders for this visit:    Acquired hypothyroidism    Type 2 diabetes mellitus with complication, with long-term current use of insulin    Vitamin D deficiency    Hyperlipidemia, unspecified hyperlipidemia type    Hyperuricemia    Dyslipidemia    In summary, patient was seen and examined.  I've started her on atorvastatin 10 mg once daily for elevated triglycerides and risk reduction for cardiovascular disease and CAD.  She is only taking metformin 1 pill daily due to tolerance.  Her hemoglobin A1c reflects her diabetes is under good control.  She was educated today on the proper dosing for vascepa which is 1 g 2 pills twice daily.  She may not tolerate a total of 4 g so she will increase to 2 g right now currently she has only been taking 1 g daily.  She has an active job working at [employer].  She will follow-up with Dr. Jo in 6 months with labs prior.  I've encouraged her to contact you should she have any questions or concerns prior to then.  vascepa 1 gram 2 pills twice daily with food if tolerated  Metformin 1000 mg once daily with food  Continue all other meds  Atorvastatin 10 mg once daily

## 2017-09-09 DIAGNOSIS — E03.9 ACQUIRED HYPOTHYROIDISM: ICD-10-CM

## 2017-09-09 DIAGNOSIS — E55.9 VITAMIN D DEFICIENCY: ICD-10-CM

## 2017-09-11 RX ORDER — MELOXICAM 7.5 MG/1
TABLET ORAL
Qty: 60 TABLET | Refills: 3 | Status: SHIPPED | OUTPATIENT
Start: 2017-09-11 | End: 2017-10-09 | Stop reason: SDUPTHER

## 2017-10-09 DIAGNOSIS — E55.9 VITAMIN D DEFICIENCY: ICD-10-CM

## 2017-10-09 DIAGNOSIS — E03.9 ACQUIRED HYPOTHYROIDISM: ICD-10-CM

## 2017-10-09 RX ORDER — MELOXICAM 7.5 MG/1
7.5 TABLET ORAL 2 TIMES DAILY
Qty: 60 TABLET | Refills: 0 | Status: SHIPPED | OUTPATIENT
Start: 2017-10-09 | End: 2017-10-12 | Stop reason: SDUPTHER

## 2017-10-12 DIAGNOSIS — E55.9 VITAMIN D DEFICIENCY: ICD-10-CM

## 2017-10-12 DIAGNOSIS — E03.9 ACQUIRED HYPOTHYROIDISM: ICD-10-CM

## 2017-10-12 RX ORDER — MELOXICAM 7.5 MG/1
7.5 TABLET ORAL DAILY
Qty: 60 TABLET | Refills: 0 | Status: SHIPPED | OUTPATIENT
Start: 2017-10-12 | End: 2018-01-09 | Stop reason: SDUPTHER

## 2017-10-16 RX ORDER — ATORVASTATIN CALCIUM 10 MG/1
10 TABLET, FILM COATED ORAL DAILY
Qty: 90 TABLET | Refills: 0 | Status: SHIPPED | OUTPATIENT
Start: 2017-10-16 | End: 2018-01-18 | Stop reason: SDUPTHER

## 2017-11-07 RX ORDER — ICOSAPENT ETHYL 1000 MG/1
2 CAPSULE ORAL 2 TIMES DAILY WITH MEALS
Qty: 360 CAPSULE | Refills: 0 | Status: SHIPPED | OUTPATIENT
Start: 2017-11-07 | End: 2018-02-26 | Stop reason: SDUPTHER

## 2017-12-11 RX ORDER — MONTELUKAST SODIUM 10 MG/1
TABLET ORAL
Qty: 90 TABLET | Refills: 0 | Status: SHIPPED | OUTPATIENT
Start: 2017-12-11 | End: 2018-02-26 | Stop reason: SDUPTHER

## 2017-12-30 DIAGNOSIS — E03.9 ACQUIRED HYPOTHYROIDISM: ICD-10-CM

## 2017-12-30 DIAGNOSIS — E55.9 VITAMIN D DEFICIENCY: ICD-10-CM

## 2018-01-02 RX ORDER — PANTOPRAZOLE SODIUM 40 MG/1
TABLET, DELAYED RELEASE ORAL
Qty: 90 TABLET | Refills: 0 | Status: SHIPPED | OUTPATIENT
Start: 2018-01-02 | End: 2018-02-26 | Stop reason: SDUPTHER

## 2018-01-09 DIAGNOSIS — E55.9 VITAMIN D DEFICIENCY: ICD-10-CM

## 2018-01-09 DIAGNOSIS — E03.9 ACQUIRED HYPOTHYROIDISM: ICD-10-CM

## 2018-01-09 RX ORDER — MELOXICAM 7.5 MG/1
TABLET ORAL
Qty: 60 TABLET | Refills: 0 | Status: SHIPPED | OUTPATIENT
Start: 2018-01-09 | End: 2018-01-29 | Stop reason: SDUPTHER

## 2018-01-18 RX ORDER — ATORVASTATIN CALCIUM 10 MG/1
10 TABLET, FILM COATED ORAL DAILY
Qty: 90 TABLET | Refills: 0 | Status: SHIPPED | OUTPATIENT
Start: 2018-01-18 | End: 2018-02-26 | Stop reason: SDUPTHER

## 2018-01-29 DIAGNOSIS — E03.9 ACQUIRED HYPOTHYROIDISM: ICD-10-CM

## 2018-01-29 DIAGNOSIS — E55.9 VITAMIN D DEFICIENCY: ICD-10-CM

## 2018-01-29 RX ORDER — MELOXICAM 7.5 MG/1
TABLET ORAL
Qty: 90 TABLET | Refills: 0 | Status: SHIPPED | OUTPATIENT
Start: 2018-01-29 | End: 2018-02-26 | Stop reason: SDUPTHER

## 2018-01-31 DIAGNOSIS — E55.9 VITAMIN D DEFICIENCY: ICD-10-CM

## 2018-01-31 DIAGNOSIS — E11.8 TYPE 2 DIABETES MELLITUS WITH COMPLICATION, WITH LONG-TERM CURRENT USE OF INSULIN (HCC): ICD-10-CM

## 2018-01-31 DIAGNOSIS — E78.5 HYPERLIPIDEMIA, UNSPECIFIED HYPERLIPIDEMIA TYPE: Primary | ICD-10-CM

## 2018-01-31 DIAGNOSIS — E03.9 ACQUIRED HYPOTHYROIDISM: ICD-10-CM

## 2018-01-31 DIAGNOSIS — E79.0 HYPERURICEMIA: ICD-10-CM

## 2018-01-31 DIAGNOSIS — Z79.4 TYPE 2 DIABETES MELLITUS WITH COMPLICATION, WITH LONG-TERM CURRENT USE OF INSULIN (HCC): ICD-10-CM

## 2018-02-12 ENCOUNTER — LAB (OUTPATIENT)
Dept: ENDOCRINOLOGY | Age: 58
End: 2018-02-12

## 2018-02-12 DIAGNOSIS — E11.8 TYPE 2 DIABETES MELLITUS WITH COMPLICATION, WITH LONG-TERM CURRENT USE OF INSULIN (HCC): ICD-10-CM

## 2018-02-12 DIAGNOSIS — E55.9 VITAMIN D DEFICIENCY: ICD-10-CM

## 2018-02-12 DIAGNOSIS — E78.5 HYPERLIPIDEMIA, UNSPECIFIED HYPERLIPIDEMIA TYPE: ICD-10-CM

## 2018-02-12 DIAGNOSIS — E79.0 HYPERURICEMIA: ICD-10-CM

## 2018-02-12 DIAGNOSIS — Z79.4 TYPE 2 DIABETES MELLITUS WITH COMPLICATION, WITH LONG-TERM CURRENT USE OF INSULIN (HCC): ICD-10-CM

## 2018-02-12 DIAGNOSIS — E03.9 ACQUIRED HYPOTHYROIDISM: ICD-10-CM

## 2018-02-19 LAB
25(OH)D3+25(OH)D2 SERPL-MCNC: 66.9 NG/ML (ref 30–100)
ALBUMIN SERPL-MCNC: 4.3 G/DL (ref 3.5–5.2)
ALBUMIN/GLOB SERPL: 1.7 G/DL
ALP SERPL-CCNC: 61 U/L (ref 39–117)
ALT SERPL-CCNC: 19 U/L (ref 1–33)
AST SERPL-CCNC: 35 U/L (ref 1–32)
BILIRUB SERPL-MCNC: 0.3 MG/DL (ref 0.1–1.2)
BUN SERPL-MCNC: 16 MG/DL (ref 6–20)
BUN/CREAT SERPL: 17.4 (ref 7–25)
C PEPTIDE SERPL-MCNC: 11.5 NG/ML (ref 1.1–4.4)
CALCIUM SERPL-MCNC: 9.2 MG/DL (ref 8.6–10.5)
CHLORIDE SERPL-SCNC: 103 MMOL/L (ref 98–107)
CHOLEST SERPL-MCNC: 123 MG/DL (ref 0–200)
CO2 SERPL-SCNC: 23.3 MMOL/L (ref 22–29)
CREAT SERPL-MCNC: 0.92 MG/DL (ref 0.57–1)
GFR SERPLBLD CREATININE-BSD FMLA CKD-EPI: 63 ML/MIN/1.73
GFR SERPLBLD CREATININE-BSD FMLA CKD-EPI: 76 ML/MIN/1.73
GLOBULIN SER CALC-MCNC: 2.5 GM/DL
GLUCOSE SERPL-MCNC: 151 MG/DL (ref 65–99)
HBA1C MFR BLD: 6.55 % (ref 4.8–5.6)
HDLC SERPL-MCNC: 37 MG/DL (ref 40–60)
INTERPRETATION: NORMAL
LDLC SERPL CALC-MCNC: 47 MG/DL (ref 0–100)
Lab: NORMAL
POTASSIUM SERPL-SCNC: 4.3 MMOL/L (ref 3.5–5.2)
PROT SERPL-MCNC: 6.8 G/DL (ref 6–8.5)
SODIUM SERPL-SCNC: 143 MMOL/L (ref 136–145)
T3FREE SERPL-MCNC: 2.6 PG/ML (ref 2–4.4)
T4 FREE SERPL-MCNC: 1.51 NG/DL (ref 0.93–1.7)
T4 SERPL-MCNC: 10.26 MCG/DL (ref 4.5–11.7)
THYROGLOB AB SERPL-ACNC: 5.5 IU/ML
THYROGLOB SERPL-MCNC: 3.9 NG/ML
THYROGLOB SERPL-MCNC: ABNORMAL NG/ML
TRIGL SERPL-MCNC: 193 MG/DL (ref 0–150)
TSH SERPL DL<=0.005 MIU/L-ACNC: 0.87 MIU/ML (ref 0.27–4.2)
UNABLE TO VOID: NORMAL
URATE SERPL-MCNC: 5.7 MG/DL (ref 2.4–5.7)
VLDLC SERPL CALC-MCNC: 38.6 MG/DL (ref 5–40)

## 2018-02-26 ENCOUNTER — OFFICE VISIT (OUTPATIENT)
Dept: ENDOCRINOLOGY | Age: 58
End: 2018-02-26

## 2018-02-26 VITALS
DIASTOLIC BLOOD PRESSURE: 80 MMHG | WEIGHT: 204.6 LBS | RESPIRATION RATE: 16 BRPM | HEIGHT: 68 IN | BODY MASS INDEX: 31.01 KG/M2 | SYSTOLIC BLOOD PRESSURE: 120 MMHG

## 2018-02-26 DIAGNOSIS — E79.0 HYPERURICEMIA: ICD-10-CM

## 2018-02-26 DIAGNOSIS — E11.9 TYPE 2 DIABETES MELLITUS WITHOUT COMPLICATION, WITHOUT LONG-TERM CURRENT USE OF INSULIN (HCC): ICD-10-CM

## 2018-02-26 DIAGNOSIS — Z79.4 TYPE 2 DIABETES MELLITUS WITH COMPLICATION, WITH LONG-TERM CURRENT USE OF INSULIN (HCC): Primary | ICD-10-CM

## 2018-02-26 DIAGNOSIS — E11.8 TYPE 2 DIABETES MELLITUS WITH COMPLICATION, WITH LONG-TERM CURRENT USE OF INSULIN (HCC): Primary | ICD-10-CM

## 2018-02-26 DIAGNOSIS — E55.9 VITAMIN D DEFICIENCY: ICD-10-CM

## 2018-02-26 DIAGNOSIS — E03.9 ACQUIRED HYPOTHYROIDISM: ICD-10-CM

## 2018-02-26 DIAGNOSIS — E78.2 MIXED HYPERLIPIDEMIA: ICD-10-CM

## 2018-02-26 PROCEDURE — 99214 OFFICE O/P EST MOD 30 MIN: CPT | Performed by: INTERNAL MEDICINE

## 2018-02-26 RX ORDER — ATORVASTATIN CALCIUM 10 MG/1
10 TABLET, FILM COATED ORAL DAILY
Qty: 90 TABLET | Refills: 3 | Status: SHIPPED | OUTPATIENT
Start: 2018-02-26 | End: 2019-04-06 | Stop reason: SDUPTHER

## 2018-02-26 RX ORDER — PANTOPRAZOLE SODIUM 40 MG/1
40 TABLET, DELAYED RELEASE ORAL DAILY
Qty: 90 TABLET | Refills: 3 | Status: SHIPPED | OUTPATIENT
Start: 2018-02-26 | End: 2019-04-06 | Stop reason: SDUPTHER

## 2018-02-26 RX ORDER — CONJUGATED ESTROGENS/BAZEDOXIFENE .45; 2 MG/1; MG/1
TABLET, FILM COATED ORAL
Qty: 30 TABLET | Refills: 11 | Status: ON HOLD | OUTPATIENT
Start: 2018-02-26 | End: 2021-09-26

## 2018-02-26 RX ORDER — ALLOPURINOL 100 MG/1
200 TABLET ORAL DAILY
Qty: 180 TABLET | Refills: 3 | Status: ON HOLD | OUTPATIENT
Start: 2018-02-26 | End: 2021-09-26

## 2018-02-26 RX ORDER — LEVOTHYROXINE SODIUM 112 MCG
TABLET ORAL
Qty: 90 TABLET | Refills: 3 | Status: ON HOLD | OUTPATIENT
Start: 2018-02-26 | End: 2021-09-26

## 2018-02-26 RX ORDER — MELOXICAM 7.5 MG/1
TABLET ORAL
Qty: 90 TABLET | Refills: 3 | Status: SHIPPED | OUTPATIENT
Start: 2018-02-26

## 2018-02-26 RX ORDER — ERGOCALCIFEROL 1.25 MG/1
50000 CAPSULE ORAL 3 TIMES WEEKLY
Qty: 39 CAPSULE | Refills: 3 | Status: SHIPPED | OUTPATIENT
Start: 2018-02-26

## 2018-02-26 RX ORDER — ICOSAPENT ETHYL 1000 MG/1
2 CAPSULE ORAL 2 TIMES DAILY WITH MEALS
Qty: 360 CAPSULE | Refills: 3 | Status: SHIPPED | OUTPATIENT
Start: 2018-02-26

## 2018-02-26 RX ORDER — SITAGLIPTIN AND METFORMIN HYDROCHLORIDE 1000; 50 MG/1; MG/1
1 TABLET, FILM COATED ORAL 2 TIMES DAILY WITH MEALS
Qty: 6 TABLET | Refills: 11 | Status: SHIPPED | OUTPATIENT
Start: 2018-02-26 | End: 2018-02-26 | Stop reason: SDUPTHER

## 2018-02-26 RX ORDER — SITAGLIPTIN AND METFORMIN HYDROCHLORIDE 1000; 50 MG/1; MG/1
1 TABLET, FILM COATED ORAL 2 TIMES DAILY WITH MEALS
Qty: 180 TABLET | Refills: 3 | Status: SHIPPED | OUTPATIENT
Start: 2018-02-26 | End: 2018-09-05 | Stop reason: SDUPTHER

## 2018-02-26 RX ORDER — MONTELUKAST SODIUM 10 MG/1
10 TABLET ORAL NIGHTLY
Qty: 90 TABLET | Refills: 3 | Status: SHIPPED | OUTPATIENT
Start: 2018-02-26

## 2018-02-26 RX ORDER — LANCETS
EACH MISCELLANEOUS
Qty: 300 EACH | Refills: 3 | Status: SHIPPED | OUTPATIENT
Start: 2018-02-26

## 2018-02-26 RX ORDER — CONJUGATED ESTROGENS/BAZEDOXIFENE .45; 2 MG/1; MG/1
TABLET, FILM COATED ORAL
Qty: 30 TABLET | Refills: 11 | Status: SHIPPED | OUTPATIENT
Start: 2018-02-26 | End: 2018-02-26 | Stop reason: SDUPTHER

## 2018-02-26 NOTE — PROGRESS NOTES
"Subjective   Shanika Velez is a 57 y.o. female seen for follow up for DM2, hyperlipidemia, hypothyroidism, vit d deficiency, lab review. Patient is checking BG twice a day. No BG readings. She denies any problems or concerns. She would like to discuss taking Metformin.     History of Present Illness this is a 57-year-old female known patient with type II diabetes hypertension and dyslipidemia as well as hyperuricemia and hypothyroidism with vitamin D deficiency.  Over the course of last 6 months she has had no significant health problems for which to go to the emergency room or hospital.    /80  Resp 16  Ht 172.7 cm (68\")  Wt 92.8 kg (204 lb 9.6 oz)  BMI 31.11 kg/m2     Allergies   Allergen Reactions   • Codeine    • Fluoxetine Hcl    • Penicillins    • Tetracycline    • Tetracyclines & Related        Current Outpatient Prescriptions:   •  ACCU-CHEK FASTCLIX LANCETS misc, Test 2 times daily, Disp: 102 each, Rfl: 3  •  allopurinol (ZYLOPRIM) 100 MG tablet, Take 2 tablets by mouth Daily. (Patient taking differently: Take 100 mg by mouth Daily.), Disp: 180 tablet, Rfl: 3  •  atorvastatin (LIPITOR) 10 MG tablet, Take 1 tablet by mouth Daily., Disp: 90 tablet, Rfl: 0  •  colestipol (COLESTID) 1 G tablet, Take 1 tablet by mouth 3 (Three) Times a Day., Disp: , Rfl: 2  •  fexofenadine (ALLEGRA ALLERGY) 180 MG tablet, Take 180 mg by mouth As Needed., Disp: , Rfl:   •  fluticasone (FLONASE) 50 MCG/ACT nasal spray, into each nostril daily., Disp: , Rfl:   •  glucose blood (ACCU-CHEK JACKIE) test strip, Test twice daily, Disp: 60 each, Rfl: 5  •  meloxicam (MOBIC) 7.5 MG tablet, Take one tablet by mouth daily, Disp: 90 tablet, Rfl: 0  •  metFORMIN (GLUCOPHAGE) 1000 MG tablet, Take 1 tablet by mouth Daily With Breakfast., Disp: 90 tablet, Rfl: 1  •  montelukast (SINGULAIR) 10 MG tablet, TAKE 1 TABLET BY MOUTH EVERY NIGHT., Disp: 90 tablet, Rfl: 0  •  pantoprazole (PROTONIX) 40 MG EC tablet, TAKE 1 TABLET DAILY, Disp: 90 " tablet, Rfl: 0  •  SYNTHROID 112 MCG tablet, One tablet every day, Disp: 90 tablet, Rfl: 1  •  VASCEPA 1 g capsule capsule, Take 2 g by mouth 2 (Two) Times a Day With Meals., Disp: 360 capsule, Rfl: 0  •  vitamin D (ERGOCALCIFEROL) 12762 UNITS capsule capsule, TAKE ONE CAPSULE 3 TIMES WEEKLY, Disp: 36 capsule, Rfl: 3      The following portions of the patient's history were reviewed and updated as appropriate: allergies, current medications, past family history, past medical history, past social history, past surgical history and problem list.    Review of Systems   Constitutional: Negative.    HENT: Negative.    Eyes: Negative.    Respiratory: Negative.    Cardiovascular: Negative.    Gastrointestinal: Negative.    Endocrine: Negative.    Genitourinary: Negative.    Musculoskeletal: Negative.    Skin: Negative.    Allergic/Immunologic: Negative.    Neurological: Negative.    Hematological: Negative.    Psychiatric/Behavioral: Negative.        Objective   Physical Exam   Constitutional: She is oriented to person, place, and time. She appears well-developed and well-nourished. No distress.   HENT:   Head: Normocephalic and atraumatic.   Right Ear: External ear normal.   Left Ear: External ear normal.   Nose: Nose normal.   Mouth/Throat: Oropharynx is clear and moist. No oropharyngeal exudate.   Patient is without any teeth.   Eyes: Conjunctivae and EOM are normal. Pupils are equal, round, and reactive to light. Right eye exhibits no discharge. Left eye exhibits no discharge. No scleral icterus.   Neck: Trachea normal, normal range of motion and full passive range of motion without pain. Neck supple. No JVD present. No tracheal tenderness present. Carotid bruit is not present. No tracheal deviation, no edema and no erythema present. No thyroid mass and no thyromegaly present.   Cardiovascular: Normal rate, regular rhythm, normal heart sounds and intact distal pulses.  Exam reveals no gallop and no friction rub.    No  murmur heard.  Pulmonary/Chest: Effort normal and breath sounds normal. No stridor. No respiratory distress. She has no wheezes. She has no rales. She exhibits no tenderness.   Abdominal: Soft. Bowel sounds are normal. She exhibits no distension and no mass. There is no tenderness. There is no rebound and no guarding. No hernia.   Musculoskeletal: Normal range of motion. She exhibits no edema or deformity.   Bilateral bunions in both feet.  Also there is a significant peak toenail fungus infection and deformity.   Lymphadenopathy:     She has no cervical adenopathy.   Neurological: She is alert and oriented to person, place, and time. She has normal reflexes. She displays normal reflexes. No cranial nerve deficit. She exhibits normal muscle tone. Coordination normal.   Skin: Skin is warm and dry. No rash noted. She is not diaphoretic. No erythema. No pallor.   Psychiatric: She has a normal mood and affect. Her behavior is normal. Judgment and thought content normal.   Nursing note and vitals reviewed.    Results for orders placed or performed in visit on 02/12/18   Comprehensive Metabolic Panel   Result Value Ref Range    Glucose 151 (H) 65 - 99 mg/dL    BUN 16 6 - 20 mg/dL    Creatinine 0.92 0.57 - 1.00 mg/dL    eGFR Non African Am 63 >60 mL/min/1.73    eGFR African Am 76 >60 mL/min/1.73    BUN/Creatinine Ratio 17.4 7.0 - 25.0    Sodium 143 136 - 145 mmol/L    Potassium 4.3 3.5 - 5.2 mmol/L    Chloride 103 98 - 107 mmol/L    Total CO2 23.3 22.0 - 29.0 mmol/L    Calcium 9.2 8.6 - 10.5 mg/dL    Total Protein 6.8 6.0 - 8.5 g/dL    Albumin 4.30 3.50 - 5.20 g/dL    Globulin 2.5 gm/dL    A/G Ratio 1.7 g/dL    Total Bilirubin 0.3 0.1 - 1.2 mg/dL    Alkaline Phosphatase 61 39 - 117 U/L    AST (SGOT) 35 (H) 1 - 32 U/L    ALT (SGPT) 19 1 - 33 U/L   C-Peptide   Result Value Ref Range    C-Peptide 11.5 (H) 1.1 - 4.4 ng/mL   Hemoglobin A1c   Result Value Ref Range    Hemoglobin A1C 6.55 (H) 4.80 - 5.60 %   Lipid Panel   Result  Value Ref Range    Total Cholesterol 123 0 - 200 mg/dL    Triglycerides 193 (H) 0 - 150 mg/dL    HDL Cholesterol 37 (L) 40 - 60 mg/dL    VLDL Cholesterol 38.6 5 - 40 mg/dL    LDL Cholesterol  47 0 - 100 mg/dL   Uric Acid   Result Value Ref Range    Uric Acid 5.7 2.4 - 5.7 mg/dL   Vitamin D 25 Hydroxy   Result Value Ref Range    25 Hydroxy, Vitamin D 66.9 30.0 - 100.0 ng/mL   T4, Free   Result Value Ref Range    Free T4 1.51 0.93 - 1.70 ng/dL   T4 & TSH ([facility])   Result Value Ref Range    TSH 0.867 0.270 - 4.200 mIU/mL    T4, Total 10.26 4.50 - 11.70 mcg/dL   T3, Free   Result Value Ref Range    T3, Free 2.6 2.0 - 4.4 pg/mL   Comprehensive Thyroglobulin   Result Value Ref Range    Thyroglobulin Ab 5.5 (H) IU/mL    Thyroglobulin Comment ng/mL    Thyroglobulin (TG-DENNY) 3.9 ng/mL   Unable To Void   Result Value Ref Range    Unable to Void Comment    Cardiovascular Risk Assessment   Result Value Ref Range    Interpretation Note    Diabetes Patient Education   Result Value Ref Range    PDF Image Not applicable          Assessment/Plan   Diagnoses and all orders for this visit:    Type 2 diabetes mellitus with complication, with long-term current use of insulin  -     T3, Free; Future  -     T4 & TSH ([facility]); Future  -     T4, Free; Future  -     Vitamin D 25 Hydroxy; Future  -     Comprehensive Metabolic Panel; Future  -     C-Peptide; Future  -     Hemoglobin A1c; Future  -     Lipid Panel; Future  -     MicroAlbumin, Urine, Random - Urine, Clean Catch; Future    Acquired hypothyroidism  -     T3, Free; Future  -     T4 & TSH ([facility]); Future  -     T4, Free; Future  -     Vitamin D 25 Hydroxy; Future  -     Comprehensive Metabolic Panel; Future  -     C-Peptide; Future  -     Hemoglobin A1c; Future  -     Lipid Panel; Future  -     MicroAlbumin, Urine, Random - Urine, Clean Catch; Future  -     pantoprazole (PROTONIX) 40 MG EC tablet; Take 1 tablet by mouth Daily.  -     meloxicam (MOBIC) 7.5 MG tablet; Take one  tablet by mouth daily  -     allopurinol (ZYLOPRIM) 100 MG tablet; Take 2 tablets by mouth Daily.    Mixed hyperlipidemia  -     T3, Free; Future  -     T4 & TSH ([facility]); Future  -     T4, Free; Future  -     Vitamin D 25 Hydroxy; Future  -     Comprehensive Metabolic Panel; Future  -     C-Peptide; Future  -     Hemoglobin A1c; Future  -     Lipid Panel; Future  -     MicroAlbumin, Urine, Random - Urine, Clean Catch; Future    Vitamin D deficiency  -     T3, Free; Future  -     T4 & TSH ([facility]); Future  -     T4, Free; Future  -     Vitamin D 25 Hydroxy; Future  -     Comprehensive Metabolic Panel; Future  -     C-Peptide; Future  -     Hemoglobin A1c; Future  -     Lipid Panel; Future  -     MicroAlbumin, Urine, Random - Urine, Clean Catch; Future  -     pantoprazole (PROTONIX) 40 MG EC tablet; Take 1 tablet by mouth Daily.  -     meloxicam (MOBIC) 7.5 MG tablet; Take one tablet by mouth daily  -     allopurinol (ZYLOPRIM) 100 MG tablet; Take 2 tablets by mouth Daily.    Hyperuricemia  -     T3, Free; Future  -     T4 & TSH ([facility]); Future  -     T4, Free; Future  -     Vitamin D 25 Hydroxy; Future  -     Comprehensive Metabolic Panel; Future  -     C-Peptide; Future  -     Hemoglobin A1c; Future  -     Lipid Panel; Future  -     MicroAlbumin, Urine, Random - Urine, Clean Catch; Future    Type 2 diabetes mellitus without complication, without long-term current use of insulin  -     glucose blood (ACCU-CHEK JACKIE) test strip; Test twice daily  -     ACCU-CHEK FASTCLIX LANCETS misc; Test 2 times daily    Other orders  -     Discontinue: JANUMET  MG per tablet; Take 1 tablet by mouth 2 (Two) Times a Day With Meals.  -     Discontinue: DUAVEE 0.45-20 MG tablet; Take 1 tablet daily by mouth  -     vitamin D (ERGOCALCIFEROL) 20745 units capsule capsule; Take 1 capsule by mouth 3 (Three) Times a Week.  -     VASCEPA 1 g capsule capsule; Take 2 g by mouth 2 (Two) Times a Day With Meals.  -     SYNTHROID 112  MCG tablet; One tablet every day  -     montelukast (SINGULAIR) 10 MG tablet; Take 1 tablet by mouth Every Night.  -     JANUMET  MG per tablet; Take 1 tablet by mouth 2 (Two) Times a Day With Meals.  -     DUAVEE 0.45-20 MG tablet; Take 1 tablet daily by mouth  -     atorvastatin (LIPITOR) 10 MG tablet; Take 1 tablet by mouth Daily.               His summary I saw and examined this 57-year-old female for above-mentioned problems.  I reviewed her laboratory evaluation of 02/12/2018 and provided her with a hard copy of it.  She is clinically and metabolically stable and therefore we will go ahead and continue all her current prescriptions.  She will see Ms. Lexi Muñiz in 6 months or sooner if needed with laboratory evaluation prior to each office visit.

## 2018-02-27 LAB — MICROALBUMIN UR-MCNC: 3.1 UG/ML

## 2018-02-28 ENCOUNTER — TELEPHONE (OUTPATIENT)
Dept: ENDOCRINOLOGY | Age: 58
End: 2018-02-28

## 2018-02-28 NOTE — TELEPHONE ENCOUNTER
----- Message from Parth Jo MD sent at 2/27/2018  4:39 PM EST -----  Take one tablet in the morning for couple of weeks and then go to 2 tablets and let us know.  ----- Message -----     From: Sandra Perez MA     Sent: 2/27/2018  11:57 AM       To: Parth Jo MD    She states that yesterday she was changed to Janumet 2 QD which is 2,000mg of metformin. Previously she was only on 1,000mg and having trouble tolerating that. Now she is feeling light headed and her BG was low this morning.     Left patient a detailed voicemail with medication instructions.

## 2018-03-10 DIAGNOSIS — E03.9 ACQUIRED HYPOTHYROIDISM: ICD-10-CM

## 2018-03-10 DIAGNOSIS — E55.9 VITAMIN D DEFICIENCY: ICD-10-CM

## 2018-03-12 RX ORDER — ALLOPURINOL 100 MG/1
TABLET ORAL
Qty: 180 TABLET | Refills: 2 | Status: ON HOLD | OUTPATIENT
Start: 2018-03-12 | End: 2021-09-26

## 2018-05-24 RX ORDER — LEVOTHYROXINE SODIUM 112 MCG
TABLET ORAL
Qty: 90 TABLET | Refills: 3 | Status: SHIPPED | OUTPATIENT
Start: 2018-05-24 | End: 2022-03-17

## 2018-06-11 RX ORDER — ERGOCALCIFEROL 1.25 MG/1
CAPSULE ORAL
Qty: 36 CAPSULE | Refills: 3 | Status: ON HOLD | OUTPATIENT
Start: 2018-06-11 | End: 2021-09-26

## 2018-08-06 DIAGNOSIS — E11.8 TYPE 2 DIABETES MELLITUS WITH COMPLICATION, WITH LONG-TERM CURRENT USE OF INSULIN (HCC): ICD-10-CM

## 2018-08-06 DIAGNOSIS — E03.9 ACQUIRED HYPOTHYROIDISM: ICD-10-CM

## 2018-08-06 DIAGNOSIS — Z79.4 TYPE 2 DIABETES MELLITUS WITH COMPLICATION, WITH LONG-TERM CURRENT USE OF INSULIN (HCC): ICD-10-CM

## 2018-08-06 DIAGNOSIS — E79.0 HYPERURICEMIA: ICD-10-CM

## 2018-08-06 DIAGNOSIS — E55.9 VITAMIN D DEFICIENCY: Primary | ICD-10-CM

## 2018-08-17 ENCOUNTER — RESULTS ENCOUNTER (OUTPATIENT)
Dept: ENDOCRINOLOGY | Age: 58
End: 2018-08-17

## 2018-08-17 DIAGNOSIS — E55.9 VITAMIN D DEFICIENCY: ICD-10-CM

## 2018-08-17 DIAGNOSIS — E78.2 MIXED HYPERLIPIDEMIA: ICD-10-CM

## 2018-08-17 DIAGNOSIS — E03.9 ACQUIRED HYPOTHYROIDISM: ICD-10-CM

## 2018-08-17 DIAGNOSIS — Z79.4 TYPE 2 DIABETES MELLITUS WITH COMPLICATION, WITH LONG-TERM CURRENT USE OF INSULIN (HCC): ICD-10-CM

## 2018-08-17 DIAGNOSIS — E11.8 TYPE 2 DIABETES MELLITUS WITH COMPLICATION, WITH LONG-TERM CURRENT USE OF INSULIN (HCC): ICD-10-CM

## 2018-08-17 DIAGNOSIS — E79.0 HYPERURICEMIA: ICD-10-CM

## 2018-09-05 RX ORDER — ERTUGLIFLOZIN 15 MG/1
15 TABLET, FILM COATED ORAL EVERY MORNING
Qty: 30 TABLET | Refills: 5 | Status: SHIPPED | OUTPATIENT
Start: 2018-09-05 | End: 2018-09-11 | Stop reason: CLARIF

## 2018-09-05 RX ORDER — SITAGLIPTIN AND METFORMIN HYDROCHLORIDE 1000; 50 MG/1; MG/1
1 TABLET, FILM COATED ORAL 2 TIMES DAILY WITH MEALS
Qty: 180 TABLET | Refills: 3 | Status: ON HOLD | OUTPATIENT
Start: 2018-09-05 | End: 2021-09-26

## 2018-09-06 ENCOUNTER — RESULTS ENCOUNTER (OUTPATIENT)
Dept: ENDOCRINOLOGY | Age: 58
End: 2018-09-06

## 2018-09-06 ENCOUNTER — TELEPHONE (OUTPATIENT)
Dept: ENDOCRINOLOGY | Age: 58
End: 2018-09-06

## 2018-09-06 DIAGNOSIS — E79.0 HYPERURICEMIA: ICD-10-CM

## 2018-09-06 DIAGNOSIS — E11.8 TYPE 2 DIABETES MELLITUS WITH COMPLICATION, WITH LONG-TERM CURRENT USE OF INSULIN (HCC): ICD-10-CM

## 2018-09-06 DIAGNOSIS — E55.9 VITAMIN D DEFICIENCY: ICD-10-CM

## 2018-09-06 DIAGNOSIS — Z79.4 TYPE 2 DIABETES MELLITUS WITH COMPLICATION, WITH LONG-TERM CURRENT USE OF INSULIN (HCC): ICD-10-CM

## 2018-09-06 DIAGNOSIS — E03.9 ACQUIRED HYPOTHYROIDISM: ICD-10-CM

## 2018-09-11 RX ORDER — DAPAGLIFLOZIN 10 MG/1
TABLET, FILM COATED ORAL
Qty: 30 TABLET | Refills: 5 | Status: SHIPPED | OUTPATIENT
Start: 2018-09-11

## 2019-02-22 RX ORDER — MONTELUKAST SODIUM 10 MG/1
10 TABLET ORAL NIGHTLY
Qty: 90 TABLET | Refills: 3 | OUTPATIENT
Start: 2019-02-22

## 2019-02-27 RX ORDER — MONTELUKAST SODIUM 10 MG/1
10 TABLET ORAL NIGHTLY
Qty: 90 TABLET | Refills: 3 | OUTPATIENT
Start: 2019-02-27

## 2019-04-06 DIAGNOSIS — E55.9 VITAMIN D DEFICIENCY: ICD-10-CM

## 2019-04-06 DIAGNOSIS — E03.9 ACQUIRED HYPOTHYROIDISM: ICD-10-CM

## 2019-04-08 RX ORDER — PANTOPRAZOLE SODIUM 40 MG/1
40 TABLET, DELAYED RELEASE ORAL DAILY
Qty: 90 TABLET | Refills: 3 | Status: SHIPPED | OUTPATIENT
Start: 2019-04-08 | End: 2022-03-17

## 2019-04-08 RX ORDER — ATORVASTATIN CALCIUM 10 MG/1
10 TABLET, FILM COATED ORAL DAILY
Qty: 90 TABLET | Refills: 3 | Status: SHIPPED | OUTPATIENT
Start: 2019-04-08 | End: 2020-04-07

## 2019-04-15 RX ORDER — ICOSAPENT ETHYL 1000 MG/1
CAPSULE ORAL
Qty: 360 CAPSULE | Refills: 1 | OUTPATIENT
Start: 2019-04-15

## 2019-05-12 DIAGNOSIS — E03.9 ACQUIRED HYPOTHYROIDISM: ICD-10-CM

## 2019-05-12 DIAGNOSIS — E55.9 VITAMIN D DEFICIENCY: ICD-10-CM

## 2019-05-13 RX ORDER — MELOXICAM 7.5 MG/1
TABLET ORAL
Qty: 90 TABLET | Refills: 3 | OUTPATIENT
Start: 2019-05-13

## 2019-05-18 DIAGNOSIS — E55.9 VITAMIN D DEFICIENCY: ICD-10-CM

## 2019-05-18 DIAGNOSIS — E03.9 ACQUIRED HYPOTHYROIDISM: ICD-10-CM

## 2019-05-20 RX ORDER — MELOXICAM 7.5 MG/1
TABLET ORAL
Qty: 90 TABLET | Refills: 3 | OUTPATIENT
Start: 2019-05-20

## 2019-05-28 RX ORDER — ERGOCALCIFEROL 1.25 MG/1
CAPSULE ORAL
Qty: 36 CAPSULE | Refills: 3 | OUTPATIENT
Start: 2019-05-28

## 2019-06-03 RX ORDER — ALLOPURINOL 100 MG/1
TABLET ORAL
Qty: 180 TABLET | Refills: 1 | OUTPATIENT
Start: 2019-06-03

## 2019-07-19 DIAGNOSIS — E55.9 VITAMIN D DEFICIENCY: ICD-10-CM

## 2019-07-19 DIAGNOSIS — E03.9 ACQUIRED HYPOTHYROIDISM: ICD-10-CM

## 2019-07-22 RX ORDER — MELOXICAM 7.5 MG/1
TABLET ORAL
Qty: 90 TABLET | Refills: 3 | OUTPATIENT
Start: 2019-07-22

## 2019-07-28 DIAGNOSIS — E03.9 ACQUIRED HYPOTHYROIDISM: ICD-10-CM

## 2019-07-28 DIAGNOSIS — E55.9 VITAMIN D DEFICIENCY: ICD-10-CM

## 2019-07-29 RX ORDER — MELOXICAM 7.5 MG/1
TABLET ORAL
Qty: 90 TABLET | Refills: 3 | OUTPATIENT
Start: 2019-07-29

## 2020-11-28 NOTE — TELEPHONE ENCOUNTER
----- Message from Parth Jo MD sent at 9/5/2018  4:15 PM EDT -----  Contact: PATIENT   She needs to take her Janumet twice daily and in addition I started her on Steglatro 15 mg every morning.  Ask her to stop by to  some samples and co-pay reduction card that we will drop her co-pay to $0.  ----- Message -----  From: Sandra Perez MA  Sent: 9/5/2018  12:40 PM  To: Parth Jo MD     Log given in folder  ----- Message -----  From: Renay Agarwal  Sent: 9/5/2018  12:20 PM  To: Sandra Perez MA    MESSAGE FROM THE PATIENT     MESSAGE: PATIENT HAS CALLED IN REGARDS TO HAVING HIGH BLOOD SUGARS. PATIENT HAS GIVEN OUR OFFICE THE LAST TWO WEEKS OF READING. PATIENT DID ALSO STATE , SHE HAS BEEN ONLY TAKING 1 TABLET OF JANUMET  MG per tablet .       PATIENT IS REQUESTING A CALL BACK IN REGARDS TO HER READING       NOTE: PATIENT WAS INFORMED OUR OFFICE HAS 24-48 HOURS TO RESPOND.   CALL BACK NUMBER  [phone]    Left patient a detailed voicemail.      
PT'S PA FOR STEGLATRO WAS SUBMITTED ON 9/6/18  
Yes

## 2021-03-16 ENCOUNTER — BULK ORDERING (OUTPATIENT)
Dept: CASE MANAGEMENT | Facility: OTHER | Age: 61
End: 2021-03-16

## 2021-03-16 DIAGNOSIS — Z23 IMMUNIZATION DUE: ICD-10-CM

## 2021-09-26 ENCOUNTER — APPOINTMENT (OUTPATIENT)
Dept: GENERAL RADIOLOGY | Facility: HOSPITAL | Age: 61
End: 2021-09-26

## 2021-09-26 ENCOUNTER — HOSPITAL ENCOUNTER (INPATIENT)
Facility: HOSPITAL | Age: 61
LOS: 11 days | Discharge: HOME | End: 2021-10-07
Attending: EMERGENCY MEDICINE | Admitting: ORTHOPAEDIC SURGERY

## 2021-09-26 ENCOUNTER — APPOINTMENT (OUTPATIENT)
Dept: CT IMAGING | Facility: HOSPITAL | Age: 61
End: 2021-09-26

## 2021-09-26 DIAGNOSIS — E55.9 VITAMIN D DEFICIENCY: ICD-10-CM

## 2021-09-26 DIAGNOSIS — S82.141A TIBIAL PLATEAU FRACTURE, RIGHT, CLOSED, INITIAL ENCOUNTER: ICD-10-CM

## 2021-09-26 DIAGNOSIS — E03.9 ACQUIRED HYPOTHYROIDISM: ICD-10-CM

## 2021-09-26 DIAGNOSIS — S82.141A CLOSED FRACTURE OF RIGHT TIBIAL PLATEAU, INITIAL ENCOUNTER: Primary | ICD-10-CM

## 2021-09-26 LAB
ALBUMIN SERPL-MCNC: 4.6 G/DL (ref 3.5–5.2)
ALBUMIN/GLOB SERPL: 2.1 G/DL
ALP SERPL-CCNC: 45 U/L (ref 39–117)
ALT SERPL W P-5'-P-CCNC: 17 U/L (ref 1–33)
ANION GAP SERPL CALCULATED.3IONS-SCNC: 13.2 MMOL/L (ref 5–15)
AST SERPL-CCNC: 17 U/L (ref 1–32)
BASOPHILS # BLD AUTO: 0.03 10*3/MM3 (ref 0–0.2)
BASOPHILS NFR BLD AUTO: 0.3 % (ref 0–1.5)
BILIRUB SERPL-MCNC: 0.4 MG/DL (ref 0–1.2)
BUN SERPL-MCNC: 11 MG/DL (ref 8–23)
BUN/CREAT SERPL: 18 (ref 7–25)
CALCIUM SPEC-SCNC: 9.4 MG/DL (ref 8.6–10.5)
CHLORIDE SERPL-SCNC: 106 MMOL/L (ref 98–107)
CO2 SERPL-SCNC: 23.8 MMOL/L (ref 22–29)
CREAT SERPL-MCNC: 0.61 MG/DL (ref 0.57–1)
DEPRECATED RDW RBC AUTO: 47.9 FL (ref 37–54)
EOSINOPHIL # BLD AUTO: 0.05 10*3/MM3 (ref 0–0.4)
EOSINOPHIL NFR BLD AUTO: 0.5 % (ref 0.3–6.2)
ERYTHROCYTE [DISTWIDTH] IN BLOOD BY AUTOMATED COUNT: 14.6 % (ref 12.3–15.4)
GFR SERPL CREATININE-BSD FRML MDRD: 100 ML/MIN/1.73
GLOBULIN UR ELPH-MCNC: 2.2 GM/DL
GLUCOSE SERPL-MCNC: 135 MG/DL (ref 65–99)
HCT VFR BLD AUTO: 37.9 % (ref 34–46.6)
HGB BLD-MCNC: 12.5 G/DL (ref 12–15.9)
IMM GRANULOCYTES # BLD AUTO: 0.05 10*3/MM3 (ref 0–0.05)
IMM GRANULOCYTES NFR BLD AUTO: 0.5 % (ref 0–0.5)
INR PPP: 0.98 (ref 0.9–1.1)
LYMPHOCYTES # BLD AUTO: 1.5 10*3/MM3 (ref 0.7–3.1)
LYMPHOCYTES NFR BLD AUTO: 14.8 % (ref 19.6–45.3)
MCH RBC QN AUTO: 29.6 PG (ref 26.6–33)
MCHC RBC AUTO-ENTMCNC: 33 G/DL (ref 31.5–35.7)
MCV RBC AUTO: 89.6 FL (ref 79–97)
MONOCYTES # BLD AUTO: 0.46 10*3/MM3 (ref 0.1–0.9)
MONOCYTES NFR BLD AUTO: 4.5 % (ref 5–12)
NEUTROPHILS NFR BLD AUTO: 79.4 % (ref 42.7–76)
NEUTROPHILS NFR BLD AUTO: 8.04 10*3/MM3 (ref 1.7–7)
NRBC BLD AUTO-RTO: 0 /100 WBC (ref 0–0.2)
PLATELET # BLD AUTO: 222 10*3/MM3 (ref 140–450)
PMV BLD AUTO: 9.9 FL (ref 6–12)
POTASSIUM SERPL-SCNC: 3.9 MMOL/L (ref 3.5–5.2)
PROT SERPL-MCNC: 6.8 G/DL (ref 6–8.5)
PROTHROMBIN TIME: 12.8 SECONDS (ref 11.7–14.2)
RBC # BLD AUTO: 4.23 10*6/MM3 (ref 3.77–5.28)
SARS-COV-2 RNA PNL SPEC NAA+PROBE: NOT DETECTED
SODIUM SERPL-SCNC: 143 MMOL/L (ref 136–145)
WBC # BLD AUTO: 10.13 10*3/MM3 (ref 3.4–10.8)

## 2021-09-26 PROCEDURE — 73700 CT LOWER EXTREMITY W/O DYE: CPT

## 2021-09-26 PROCEDURE — 73562 X-RAY EXAM OF KNEE 3: CPT

## 2021-09-26 PROCEDURE — 87635 SARS-COV-2 COVID-19 AMP PRB: CPT | Performed by: ORTHOPAEDIC SURGERY

## 2021-09-26 PROCEDURE — 99284 EMERGENCY DEPT VISIT MOD MDM: CPT

## 2021-09-26 PROCEDURE — 25010000002 MORPHINE PER 10 MG: Performed by: INTERNAL MEDICINE

## 2021-09-26 PROCEDURE — 80053 COMPREHEN METABOLIC PANEL: CPT | Performed by: EMERGENCY MEDICINE

## 2021-09-26 PROCEDURE — 85025 COMPLETE CBC W/AUTO DIFF WBC: CPT | Performed by: EMERGENCY MEDICINE

## 2021-09-26 PROCEDURE — 72220 X-RAY EXAM SACRUM TAILBONE: CPT

## 2021-09-26 PROCEDURE — 85610 PROTHROMBIN TIME: CPT | Performed by: EMERGENCY MEDICINE

## 2021-09-26 RX ORDER — CEFAZOLIN SODIUM 2 G/100ML
2 INJECTION, SOLUTION INTRAVENOUS
Status: COMPLETED | OUTPATIENT
Start: 2021-09-27 | End: 2021-09-28

## 2021-09-26 RX ORDER — ICOSAPENT ETHYL 1000 MG/1
2 CAPSULE ORAL 2 TIMES DAILY WITH MEALS
Status: DISCONTINUED | OUTPATIENT
Start: 2021-09-26 | End: 2021-09-28

## 2021-09-26 RX ORDER — DEXTROSE MONOHYDRATE 25 G/50ML
25 INJECTION, SOLUTION INTRAVENOUS
Status: DISCONTINUED | OUTPATIENT
Start: 2021-09-26 | End: 2021-10-07 | Stop reason: HOSPADM

## 2021-09-26 RX ORDER — SODIUM CHLORIDE 0.9 % (FLUSH) 0.9 %
10 SYRINGE (ML) INJECTION EVERY 12 HOURS SCHEDULED
Status: DISCONTINUED | OUTPATIENT
Start: 2021-09-26 | End: 2021-10-07 | Stop reason: HOSPADM

## 2021-09-26 RX ORDER — MONTELUKAST SODIUM 10 MG/1
10 TABLET ORAL NIGHTLY
Status: DISCONTINUED | OUTPATIENT
Start: 2021-09-26 | End: 2021-10-07 | Stop reason: HOSPADM

## 2021-09-26 RX ORDER — LEVOTHYROXINE SODIUM 112 UG/1
112 TABLET ORAL DAILY
Status: DISCONTINUED | OUTPATIENT
Start: 2021-09-27 | End: 2021-10-07 | Stop reason: HOSPADM

## 2021-09-26 RX ORDER — ERGOCALCIFEROL 1.25 MG/1
50000 CAPSULE ORAL WEEKLY
Status: DISCONTINUED | OUTPATIENT
Start: 2021-09-30 | End: 2021-10-07 | Stop reason: HOSPADM

## 2021-09-26 RX ORDER — HYDROCODONE BITARTRATE AND ACETAMINOPHEN 5; 325 MG/1; MG/1
1 TABLET ORAL EVERY 4 HOURS PRN
Status: DISCONTINUED | OUTPATIENT
Start: 2021-09-26 | End: 2021-09-27

## 2021-09-26 RX ORDER — FLUTICASONE PROPIONATE 50 MCG
1 SPRAY, SUSPENSION (ML) NASAL DAILY
Status: DISCONTINUED | OUTPATIENT
Start: 2021-09-27 | End: 2021-10-07 | Stop reason: HOSPADM

## 2021-09-26 RX ORDER — ALLOPURINOL 100 MG/1
200 TABLET ORAL DAILY
Status: DISCONTINUED | OUTPATIENT
Start: 2021-09-27 | End: 2021-10-02

## 2021-09-26 RX ORDER — ACETAMINOPHEN 650 MG/1
650 SUPPOSITORY RECTAL EVERY 4 HOURS PRN
Status: DISCONTINUED | OUTPATIENT
Start: 2021-09-26 | End: 2021-10-07 | Stop reason: HOSPADM

## 2021-09-26 RX ORDER — ONDANSETRON 2 MG/ML
4 INJECTION INTRAMUSCULAR; INTRAVENOUS EVERY 6 HOURS PRN
Status: DISCONTINUED | OUTPATIENT
Start: 2021-09-26 | End: 2021-10-07 | Stop reason: HOSPADM

## 2021-09-26 RX ORDER — NICOTINE POLACRILEX 4 MG
15 LOZENGE BUCCAL
Status: DISCONTINUED | OUTPATIENT
Start: 2021-09-26 | End: 2021-10-07 | Stop reason: HOSPADM

## 2021-09-26 RX ORDER — MORPHINE SULFATE 2 MG/ML
1 INJECTION, SOLUTION INTRAMUSCULAR; INTRAVENOUS EVERY 4 HOURS PRN
Status: DISPENSED | OUTPATIENT
Start: 2021-09-26 | End: 2021-10-03

## 2021-09-26 RX ORDER — NALOXONE HCL 0.4 MG/ML
0.4 VIAL (ML) INJECTION
Status: DISCONTINUED | OUTPATIENT
Start: 2021-09-26 | End: 2021-10-07 | Stop reason: HOSPADM

## 2021-09-26 RX ORDER — ERGOCALCIFEROL 1.25 MG/1
50000 CAPSULE ORAL
Status: DISCONTINUED | OUTPATIENT
Start: 2021-09-26 | End: 2021-09-26

## 2021-09-26 RX ORDER — PANTOPRAZOLE SODIUM 40 MG/1
40 TABLET, DELAYED RELEASE ORAL DAILY
Status: DISCONTINUED | OUTPATIENT
Start: 2021-09-27 | End: 2021-10-07 | Stop reason: HOSPADM

## 2021-09-26 RX ORDER — ACETAMINOPHEN 160 MG/5ML
650 SOLUTION ORAL EVERY 4 HOURS PRN
Status: DISCONTINUED | OUTPATIENT
Start: 2021-09-26 | End: 2021-10-07 | Stop reason: HOSPADM

## 2021-09-26 RX ORDER — HYDROCODONE BITARTRATE AND ACETAMINOPHEN 5; 325 MG/1; MG/1
1 TABLET ORAL ONCE
Status: COMPLETED | OUTPATIENT
Start: 2021-09-26 | End: 2021-09-26

## 2021-09-26 RX ORDER — CETIRIZINE HYDROCHLORIDE 10 MG/1
10 TABLET ORAL DAILY
Status: DISCONTINUED | OUTPATIENT
Start: 2021-09-27 | End: 2021-10-07 | Stop reason: HOSPADM

## 2021-09-26 RX ORDER — SODIUM CHLORIDE 0.9 % (FLUSH) 0.9 %
10 SYRINGE (ML) INJECTION AS NEEDED
Status: DISCONTINUED | OUTPATIENT
Start: 2021-09-26 | End: 2021-10-07 | Stop reason: HOSPADM

## 2021-09-26 RX ORDER — INSULIN LISPRO 100 [IU]/ML
0-9 INJECTION, SOLUTION INTRAVENOUS; SUBCUTANEOUS
Status: DISCONTINUED | OUTPATIENT
Start: 2021-09-27 | End: 2021-10-07 | Stop reason: HOSPADM

## 2021-09-26 RX ORDER — SODIUM CHLORIDE 9 MG/ML
75 INJECTION, SOLUTION INTRAVENOUS CONTINUOUS
Status: DISCONTINUED | OUTPATIENT
Start: 2021-09-26 | End: 2021-09-29

## 2021-09-26 RX ORDER — LEVOTHYROXINE SODIUM 112 UG/1
112 TABLET ORAL
Status: DISCONTINUED | OUTPATIENT
Start: 2021-09-27 | End: 2021-09-26 | Stop reason: SDUPTHER

## 2021-09-26 RX ORDER — ATORVASTATIN CALCIUM 10 MG/1
10 TABLET, FILM COATED ORAL DAILY
COMMUNITY

## 2021-09-26 RX ORDER — ACETAMINOPHEN 325 MG/1
650 TABLET ORAL EVERY 4 HOURS PRN
Status: DISCONTINUED | OUTPATIENT
Start: 2021-09-26 | End: 2021-10-07 | Stop reason: HOSPADM

## 2021-09-26 RX ADMIN — MONTELUKAST SODIUM 10 MG: 10 TABLET, FILM COATED ORAL at 22:56

## 2021-09-26 RX ADMIN — MORPHINE SULFATE 1 MG: 2 INJECTION, SOLUTION INTRAMUSCULAR; INTRAVENOUS at 21:36

## 2021-09-26 RX ADMIN — SODIUM CHLORIDE, PRESERVATIVE FREE 10 ML: 5 INJECTION INTRAVENOUS at 21:41

## 2021-09-26 RX ADMIN — HYDROCODONE BITARTRATE AND ACETAMINOPHEN 1 TABLET: 5; 325 TABLET ORAL at 19:42

## 2021-09-26 RX ADMIN — SODIUM CHLORIDE 75 ML/HR: 9 INJECTION, SOLUTION INTRAVENOUS at 21:37

## 2021-09-26 RX ADMIN — HYDROCODONE BITARTRATE AND ACETAMINOPHEN 1 TABLET: 5; 325 TABLET ORAL at 23:29

## 2021-09-27 LAB
ALBUMIN SERPL-MCNC: 4.2 G/DL (ref 3.5–5.2)
ALBUMIN/GLOB SERPL: 1.9 G/DL
ALP SERPL-CCNC: 42 U/L (ref 39–117)
ALT SERPL W P-5'-P-CCNC: 13 U/L (ref 1–33)
ANION GAP SERPL CALCULATED.3IONS-SCNC: 11.8 MMOL/L (ref 5–15)
AST SERPL-CCNC: 18 U/L (ref 1–32)
BASOPHILS # BLD AUTO: 0.03 10*3/MM3 (ref 0–0.2)
BASOPHILS NFR BLD AUTO: 0.4 % (ref 0–1.5)
BILIRUB SERPL-MCNC: 0.4 MG/DL (ref 0–1.2)
BUN SERPL-MCNC: 9 MG/DL (ref 8–23)
BUN/CREAT SERPL: 15.3 (ref 7–25)
CALCIUM SPEC-SCNC: 9.1 MG/DL (ref 8.6–10.5)
CHLORIDE SERPL-SCNC: 105 MMOL/L (ref 98–107)
CO2 SERPL-SCNC: 23.2 MMOL/L (ref 22–29)
CREAT SERPL-MCNC: 0.59 MG/DL (ref 0.57–1)
DEPRECATED RDW RBC AUTO: 46.6 FL (ref 37–54)
EOSINOPHIL # BLD AUTO: 0.07 10*3/MM3 (ref 0–0.4)
EOSINOPHIL NFR BLD AUTO: 0.9 % (ref 0.3–6.2)
ERYTHROCYTE [DISTWIDTH] IN BLOOD BY AUTOMATED COUNT: 14 % (ref 12.3–15.4)
GFR SERPL CREATININE-BSD FRML MDRD: 104 ML/MIN/1.73
GLOBULIN UR ELPH-MCNC: 2.2 GM/DL
GLUCOSE BLDC GLUCOMTR-MCNC: 128 MG/DL (ref 70–130)
GLUCOSE BLDC GLUCOMTR-MCNC: 147 MG/DL (ref 70–130)
GLUCOSE BLDC GLUCOMTR-MCNC: 156 MG/DL (ref 70–130)
GLUCOSE BLDC GLUCOMTR-MCNC: 159 MG/DL (ref 70–130)
GLUCOSE BLDC GLUCOMTR-MCNC: 191 MG/DL (ref 70–130)
GLUCOSE SERPL-MCNC: 146 MG/DL (ref 65–99)
HBA1C MFR BLD: 6.5 % (ref 4.8–5.6)
HCT VFR BLD AUTO: 35.3 % (ref 34–46.6)
HGB BLD-MCNC: 11.3 G/DL (ref 12–15.9)
IMM GRANULOCYTES # BLD AUTO: 0.02 10*3/MM3 (ref 0–0.05)
IMM GRANULOCYTES NFR BLD AUTO: 0.3 % (ref 0–0.5)
LYMPHOCYTES # BLD AUTO: 2.19 10*3/MM3 (ref 0.7–3.1)
LYMPHOCYTES NFR BLD AUTO: 28.4 % (ref 19.6–45.3)
MCH RBC QN AUTO: 28.7 PG (ref 26.6–33)
MCHC RBC AUTO-ENTMCNC: 32 G/DL (ref 31.5–35.7)
MCV RBC AUTO: 89.6 FL (ref 79–97)
MONOCYTES # BLD AUTO: 0.59 10*3/MM3 (ref 0.1–0.9)
MONOCYTES NFR BLD AUTO: 7.6 % (ref 5–12)
NEUTROPHILS NFR BLD AUTO: 4.82 10*3/MM3 (ref 1.7–7)
NEUTROPHILS NFR BLD AUTO: 62.4 % (ref 42.7–76)
NRBC BLD AUTO-RTO: 0 /100 WBC (ref 0–0.2)
PLATELET # BLD AUTO: 205 10*3/MM3 (ref 140–450)
PMV BLD AUTO: 9.8 FL (ref 6–12)
POTASSIUM SERPL-SCNC: 3.9 MMOL/L (ref 3.5–5.2)
PROT SERPL-MCNC: 6.4 G/DL (ref 6–8.5)
RBC # BLD AUTO: 3.94 10*6/MM3 (ref 3.77–5.28)
SODIUM SERPL-SCNC: 140 MMOL/L (ref 136–145)
WBC # BLD AUTO: 7.72 10*3/MM3 (ref 3.4–10.8)

## 2021-09-27 PROCEDURE — 85025 COMPLETE CBC W/AUTO DIFF WBC: CPT | Performed by: INTERNAL MEDICINE

## 2021-09-27 PROCEDURE — 25010000002 MORPHINE PER 10 MG: Performed by: INTERNAL MEDICINE

## 2021-09-27 PROCEDURE — 83036 HEMOGLOBIN GLYCOSYLATED A1C: CPT | Performed by: INTERNAL MEDICINE

## 2021-09-27 PROCEDURE — 82962 GLUCOSE BLOOD TEST: CPT

## 2021-09-27 PROCEDURE — 80053 COMPREHEN METABOLIC PANEL: CPT | Performed by: INTERNAL MEDICINE

## 2021-09-27 RX ORDER — IODINE/SODIUM IODIDE 2 %
TINCTURE TOPICAL EVERY 8 HOURS PRN
Status: DISCONTINUED | OUTPATIENT
Start: 2021-09-27 | End: 2021-10-07 | Stop reason: HOSPADM

## 2021-09-27 RX ORDER — AMOXICILLIN 250 MG
1 CAPSULE ORAL 2 TIMES DAILY
Status: DISCONTINUED | OUTPATIENT
Start: 2021-09-27 | End: 2021-09-30

## 2021-09-27 RX ORDER — OXYCODONE HYDROCHLORIDE AND ACETAMINOPHEN 5; 325 MG/1; MG/1
1 TABLET ORAL EVERY 4 HOURS PRN
Status: DISCONTINUED | OUTPATIENT
Start: 2021-09-27 | End: 2021-10-07 | Stop reason: HOSPADM

## 2021-09-27 RX ORDER — OXYCODONE HYDROCHLORIDE AND ACETAMINOPHEN 5; 325 MG/1; MG/1
2 TABLET ORAL EVERY 4 HOURS PRN
Status: DISCONTINUED | OUTPATIENT
Start: 2021-09-27 | End: 2021-10-03

## 2021-09-27 RX ORDER — ATORVASTATIN CALCIUM 20 MG/1
10 TABLET, FILM COATED ORAL DAILY
Status: DISCONTINUED | OUTPATIENT
Start: 2021-09-27 | End: 2021-10-07 | Stop reason: HOSPADM

## 2021-09-27 RX ADMIN — OXYCODONE AND ACETAMINOPHEN 2 TABLET: 5; 325 TABLET ORAL at 22:47

## 2021-09-27 RX ADMIN — CETIRIZINE HYDROCHLORIDE 10 MG: 10 TABLET ORAL at 08:55

## 2021-09-27 RX ADMIN — OXYCODONE AND ACETAMINOPHEN 2 TABLET: 5; 325 TABLET ORAL at 18:32

## 2021-09-27 RX ADMIN — OXYCODONE AND ACETAMINOPHEN 2 TABLET: 5; 325 TABLET ORAL at 14:18

## 2021-09-27 RX ADMIN — DOCUSATE SODIUM 50MG AND SENNOSIDES 8.6MG 1 TABLET: 8.6; 5 TABLET, FILM COATED ORAL at 21:12

## 2021-09-27 RX ADMIN — ALLOPURINOL 200 MG: 100 TABLET ORAL at 08:55

## 2021-09-27 RX ADMIN — ATORVASTATIN CALCIUM 10 MG: 20 TABLET, FILM COATED ORAL at 08:57

## 2021-09-27 RX ADMIN — HYDROCODONE BITARTRATE AND ACETAMINOPHEN 1 TABLET: 5; 325 TABLET ORAL at 03:56

## 2021-09-27 RX ADMIN — ICOSAPENT ETHYL 2 G: 1000 CAPSULE ORAL at 17:16

## 2021-09-27 RX ADMIN — MORPHINE SULFATE 1 MG: 2 INJECTION, SOLUTION INTRAMUSCULAR; INTRAVENOUS at 06:05

## 2021-09-27 RX ADMIN — LEVOTHYROXINE SODIUM 112 MCG: 0.11 TABLET ORAL at 08:55

## 2021-09-27 RX ADMIN — PANTOPRAZOLE SODIUM 40 MG: 40 TABLET, DELAYED RELEASE ORAL at 08:55

## 2021-09-27 RX ADMIN — MORPHINE SULFATE 1 MG: 2 INJECTION, SOLUTION INTRAMUSCULAR; INTRAVENOUS at 01:33

## 2021-09-27 RX ADMIN — OXYCODONE AND ACETAMINOPHEN 2 TABLET: 5; 325 TABLET ORAL at 08:55

## 2021-09-27 RX ADMIN — ICOSAPENT ETHYL 2 G: 1000 CAPSULE ORAL at 08:55

## 2021-09-27 RX ADMIN — MONTELUKAST SODIUM 10 MG: 10 TABLET, FILM COATED ORAL at 21:12

## 2021-09-27 RX ADMIN — SODIUM CHLORIDE, PRESERVATIVE FREE 10 ML: 5 INJECTION INTRAVENOUS at 21:12

## 2021-09-28 ENCOUNTER — ANESTHESIA (OUTPATIENT)
Dept: PERIOP | Facility: HOSPITAL | Age: 61
End: 2021-09-28

## 2021-09-28 ENCOUNTER — APPOINTMENT (OUTPATIENT)
Dept: GENERAL RADIOLOGY | Facility: HOSPITAL | Age: 61
End: 2021-09-28

## 2021-09-28 ENCOUNTER — ANESTHESIA EVENT (OUTPATIENT)
Dept: PERIOP | Facility: HOSPITAL | Age: 61
End: 2021-09-28

## 2021-09-28 LAB
ANION GAP SERPL CALCULATED.3IONS-SCNC: 11.2 MMOL/L (ref 5–15)
BUN SERPL-MCNC: 10 MG/DL (ref 8–23)
BUN/CREAT SERPL: 15.2 (ref 7–25)
CALCIUM SPEC-SCNC: 9 MG/DL (ref 8.6–10.5)
CHLORIDE SERPL-SCNC: 106 MMOL/L (ref 98–107)
CO2 SERPL-SCNC: 24.8 MMOL/L (ref 22–29)
CREAT SERPL-MCNC: 0.66 MG/DL (ref 0.57–1)
DEPRECATED RDW RBC AUTO: 44.6 FL (ref 37–54)
ERYTHROCYTE [DISTWIDTH] IN BLOOD BY AUTOMATED COUNT: 13.7 % (ref 12.3–15.4)
GFR SERPL CREATININE-BSD FRML MDRD: 91 ML/MIN/1.73
GLUCOSE BLDC GLUCOMTR-MCNC: 135 MG/DL (ref 70–130)
GLUCOSE BLDC GLUCOMTR-MCNC: 140 MG/DL (ref 70–130)
GLUCOSE BLDC GLUCOMTR-MCNC: 142 MG/DL (ref 70–130)
GLUCOSE BLDC GLUCOMTR-MCNC: 215 MG/DL (ref 70–130)
GLUCOSE BLDC GLUCOMTR-MCNC: 224 MG/DL (ref 70–130)
GLUCOSE SERPL-MCNC: 130 MG/DL (ref 65–99)
HCT VFR BLD AUTO: 33.7 % (ref 34–46.6)
HGB BLD-MCNC: 10.9 G/DL (ref 12–15.9)
MCH RBC QN AUTO: 28.7 PG (ref 26.6–33)
MCHC RBC AUTO-ENTMCNC: 32.3 G/DL (ref 31.5–35.7)
MCV RBC AUTO: 88.7 FL (ref 79–97)
PLATELET # BLD AUTO: 188 10*3/MM3 (ref 140–450)
PMV BLD AUTO: 9.9 FL (ref 6–12)
POTASSIUM SERPL-SCNC: 4.1 MMOL/L (ref 3.5–5.2)
RBC # BLD AUTO: 3.8 10*6/MM3 (ref 3.77–5.28)
SODIUM SERPL-SCNC: 142 MMOL/L (ref 136–145)
WBC # BLD AUTO: 7.65 10*3/MM3 (ref 3.4–10.8)

## 2021-09-28 PROCEDURE — 25010000002 MIDAZOLAM PER 1 MG: Performed by: ANESTHESIOLOGY

## 2021-09-28 PROCEDURE — C1713 ANCHOR/SCREW BN/BN,TIS/BN: HCPCS | Performed by: ORTHOPAEDIC SURGERY

## 2021-09-28 PROCEDURE — 25010000002 DEXAMETHASONE PER 1 MG: Performed by: NURSE ANESTHETIST, CERTIFIED REGISTERED

## 2021-09-28 PROCEDURE — 25010000002 PROPOFOL 10 MG/ML EMULSION: Performed by: NURSE ANESTHETIST, CERTIFIED REGISTERED

## 2021-09-28 PROCEDURE — 76000 FLUOROSCOPY <1 HR PHYS/QHP: CPT | Performed by: ORTHOPAEDIC SURGERY

## 2021-09-28 PROCEDURE — 80048 BASIC METABOLIC PNL TOTAL CA: CPT | Performed by: STUDENT IN AN ORGANIZED HEALTH CARE EDUCATION/TRAINING PROGRAM

## 2021-09-28 PROCEDURE — C1889 IMPLANT/INSERT DEVICE, NOC: HCPCS | Performed by: ORTHOPAEDIC SURGERY

## 2021-09-28 PROCEDURE — 25010000002 ONDANSETRON PER 1 MG: Performed by: INTERNAL MEDICINE

## 2021-09-28 PROCEDURE — 25010000002 FENTANYL CITRATE (PF) 50 MCG/ML SOLUTION: Performed by: ANESTHESIOLOGY

## 2021-09-28 PROCEDURE — 82962 GLUCOSE BLOOD TEST: CPT

## 2021-09-28 PROCEDURE — 25010000002 NEOSTIGMINE 5 MG/10ML SOLUTION: Performed by: NURSE ANESTHETIST, CERTIFIED REGISTERED

## 2021-09-28 PROCEDURE — 25010000002 HYDROMORPHONE PER 4 MG: Performed by: NURSE ANESTHETIST, CERTIFIED REGISTERED

## 2021-09-28 PROCEDURE — 25010000002 FENTANYL CITRATE (PF) 50 MCG/ML SOLUTION: Performed by: NURSE ANESTHETIST, CERTIFIED REGISTERED

## 2021-09-28 PROCEDURE — 25010000003 CEFAZOLIN IN DEXTROSE 2-4 GM/100ML-% SOLUTION: Performed by: ORTHOPAEDIC SURGERY

## 2021-09-28 PROCEDURE — 63710000001 INSULIN LISPRO (HUMAN) PER 5 UNITS: Performed by: ORTHOPAEDIC SURGERY

## 2021-09-28 PROCEDURE — 73590 X-RAY EXAM OF LOWER LEG: CPT | Performed by: ORTHOPAEDIC SURGERY

## 2021-09-28 PROCEDURE — 73560 X-RAY EXAM OF KNEE 1 OR 2: CPT

## 2021-09-28 PROCEDURE — 25010000002 MORPHINE PER 10 MG: Performed by: INTERNAL MEDICINE

## 2021-09-28 PROCEDURE — 85027 COMPLETE CBC AUTOMATED: CPT | Performed by: STUDENT IN AN ORGANIZED HEALTH CARE EDUCATION/TRAINING PROGRAM

## 2021-09-28 PROCEDURE — 25010000003 CEFAZOLIN IN DEXTROSE 2-4 GM/100ML-% SOLUTION: Performed by: INTERNAL MEDICINE

## 2021-09-28 PROCEDURE — 0QSG04Z REPOSITION RIGHT TIBIA WITH INTERNAL FIXATION DEVICE, OPEN APPROACH: ICD-10-PCS | Performed by: ORTHOPAEDIC SURGERY

## 2021-09-28 DEVICE — EVOS 3.5MM X 60MM LOCKING SCREW SELF-TAPPING
Type: IMPLANTABLE DEVICE | Site: TIBIA | Status: FUNCTIONAL
Brand: EVOS

## 2021-09-28 DEVICE — VIOLET ANTIBACTERIAL POLYDIOXANONE, KNOTLESS TISSUE CONTROL DEVICE
Type: IMPLANTABLE DEVICE | Site: TIBIA | Status: FUNCTIONAL
Brand: STRATAFIX

## 2021-09-28 DEVICE — EVOS 3.5MM X 70MM LOCKING SCREW SELF-TAPPING
Type: IMPLANTABLE DEVICE | Site: TIBIA | Status: FUNCTIONAL
Brand: EVOS

## 2021-09-28 DEVICE — EVOS 3.5MM X 65MM LOCKING SCREW SELF-TAPPING
Type: IMPLANTABLE DEVICE | Site: TIBIA | Status: FUNCTIONAL
Brand: EVOS

## 2021-09-28 DEVICE — EVOS 3.5MM X 50MM LOCKING SCREW SELF-TAPPING
Type: IMPLANTABLE DEVICE | Site: TIBIA | Status: FUNCTIONAL
Brand: EVOS

## 2021-09-28 DEVICE — EVOS 3.5MM X 32MM CORTEX SCREW SELF-TAPPING
Type: IMPLANTABLE DEVICE | Site: TIBIA | Status: FUNCTIONAL
Brand: EVOS

## 2021-09-28 DEVICE — KNOTLESS TISSUE CONTROL DEVICE, VIOLET UNIDIRECTIONAL (ANTIBACTERIAL) SYNTHETIC ABSORBABLE DEVICE
Type: IMPLANTABLE DEVICE | Site: TIBIA | Status: FUNCTIONAL
Brand: STRATAFIX

## 2021-09-28 DEVICE — EVOS 3.5MM X 34MM CORTEX SCREW SELF-TAPPING
Type: IMPLANTABLE DEVICE | Site: TIBIA | Status: FUNCTIONAL
Brand: EVOS

## 2021-09-28 DEVICE — EVOS 3.5MM X 55MM LOCKING SCREW SELF-TAPPING
Type: IMPLANTABLE DEVICE | Site: TIBIA | Status: FUNCTIONAL
Brand: EVOS

## 2021-09-28 DEVICE — EVOS 3.5MM LATERAL PROXIMAL TIBIA                                    PLATE 6 HOLE RIGHT 91MM
Type: IMPLANTABLE DEVICE | Site: TIBIA | Status: FUNCTIONAL
Brand: EVOS

## 2021-09-28 DEVICE — IMPLANTABLE DEVICE
Type: IMPLANTABLE DEVICE | Site: TIBIA | Status: FUNCTIONAL
Brand: CERAMENT™BONE VOID FILLER

## 2021-09-28 RX ORDER — PROMETHAZINE HYDROCHLORIDE 25 MG/1
25 SUPPOSITORY RECTAL ONCE AS NEEDED
Status: DISCONTINUED | OUTPATIENT
Start: 2021-09-28 | End: 2021-09-28 | Stop reason: HOSPADM

## 2021-09-28 RX ORDER — NEOSTIGMINE METHYLSULFATE 0.5 MG/ML
INJECTION, SOLUTION INTRAVENOUS AS NEEDED
Status: DISCONTINUED | OUTPATIENT
Start: 2021-09-28 | End: 2021-09-28 | Stop reason: SURG

## 2021-09-28 RX ORDER — EPHEDRINE SULFATE 50 MG/ML
5 INJECTION, SOLUTION INTRAVENOUS ONCE AS NEEDED
Status: DISCONTINUED | OUTPATIENT
Start: 2021-09-28 | End: 2021-09-28 | Stop reason: HOSPADM

## 2021-09-28 RX ORDER — HYDROMORPHONE HCL 110MG/55ML
PATIENT CONTROLLED ANALGESIA SYRINGE INTRAVENOUS AS NEEDED
Status: DISCONTINUED | OUTPATIENT
Start: 2021-09-28 | End: 2021-09-28 | Stop reason: SURG

## 2021-09-28 RX ORDER — CEFAZOLIN SODIUM 2 G/100ML
2 INJECTION, SOLUTION INTRAVENOUS ONCE
Status: COMPLETED | OUTPATIENT
Start: 2021-09-28 | End: 2021-09-28

## 2021-09-28 RX ORDER — HYDROMORPHONE HYDROCHLORIDE 1 MG/ML
0.5 INJECTION, SOLUTION INTRAMUSCULAR; INTRAVENOUS; SUBCUTANEOUS
Status: DISCONTINUED | OUTPATIENT
Start: 2021-09-28 | End: 2021-09-28 | Stop reason: HOSPADM

## 2021-09-28 RX ORDER — HYDROCODONE BITARTRATE AND ACETAMINOPHEN 7.5; 325 MG/1; MG/1
1 TABLET ORAL ONCE AS NEEDED
Status: DISCONTINUED | OUTPATIENT
Start: 2021-09-28 | End: 2021-09-28 | Stop reason: HOSPADM

## 2021-09-28 RX ORDER — MAGNESIUM HYDROXIDE 1200 MG/15ML
LIQUID ORAL AS NEEDED
Status: DISCONTINUED | OUTPATIENT
Start: 2021-09-28 | End: 2021-09-28 | Stop reason: HOSPADM

## 2021-09-28 RX ORDER — DIPHENHYDRAMINE HCL 25 MG
25 CAPSULE ORAL
Status: DISCONTINUED | OUTPATIENT
Start: 2021-09-28 | End: 2021-09-28 | Stop reason: HOSPADM

## 2021-09-28 RX ORDER — DIPHENHYDRAMINE HYDROCHLORIDE 50 MG/ML
12.5 INJECTION INTRAMUSCULAR; INTRAVENOUS
Status: DISCONTINUED | OUTPATIENT
Start: 2021-09-28 | End: 2021-09-28 | Stop reason: HOSPADM

## 2021-09-28 RX ORDER — FENTANYL CITRATE 50 UG/ML
50 INJECTION, SOLUTION INTRAMUSCULAR; INTRAVENOUS
Status: DISCONTINUED | OUTPATIENT
Start: 2021-09-28 | End: 2021-09-28 | Stop reason: HOSPADM

## 2021-09-28 RX ORDER — FLUMAZENIL 0.1 MG/ML
0.2 INJECTION INTRAVENOUS AS NEEDED
Status: DISCONTINUED | OUTPATIENT
Start: 2021-09-28 | End: 2021-09-28 | Stop reason: HOSPADM

## 2021-09-28 RX ORDER — SODIUM CHLORIDE, SODIUM LACTATE, POTASSIUM CHLORIDE, CALCIUM CHLORIDE 600; 310; 30; 20 MG/100ML; MG/100ML; MG/100ML; MG/100ML
9 INJECTION, SOLUTION INTRAVENOUS CONTINUOUS
Status: DISCONTINUED | OUTPATIENT
Start: 2021-09-28 | End: 2021-10-06

## 2021-09-28 RX ORDER — IBUPROFEN 600 MG/1
600 TABLET ORAL ONCE AS NEEDED
Status: DISCONTINUED | OUTPATIENT
Start: 2021-09-28 | End: 2021-09-28 | Stop reason: HOSPADM

## 2021-09-28 RX ORDER — LABETALOL HYDROCHLORIDE 5 MG/ML
5 INJECTION, SOLUTION INTRAVENOUS
Status: DISCONTINUED | OUTPATIENT
Start: 2021-09-28 | End: 2021-09-28 | Stop reason: HOSPADM

## 2021-09-28 RX ORDER — SODIUM CHLORIDE 0.9 % (FLUSH) 0.9 %
3 SYRINGE (ML) INJECTION EVERY 12 HOURS SCHEDULED
Status: DISCONTINUED | OUTPATIENT
Start: 2021-09-28 | End: 2021-09-28 | Stop reason: HOSPADM

## 2021-09-28 RX ORDER — PROMETHAZINE HYDROCHLORIDE 25 MG/1
25 TABLET ORAL ONCE AS NEEDED
Status: DISCONTINUED | OUTPATIENT
Start: 2021-09-28 | End: 2021-09-28 | Stop reason: HOSPADM

## 2021-09-28 RX ORDER — CEFAZOLIN SODIUM 2 G/100ML
2 INJECTION, SOLUTION INTRAVENOUS EVERY 8 HOURS
Status: COMPLETED | OUTPATIENT
Start: 2021-09-28 | End: 2021-09-29

## 2021-09-28 RX ORDER — SODIUM CHLORIDE 0.9 % (FLUSH) 0.9 %
3-10 SYRINGE (ML) INJECTION AS NEEDED
Status: DISCONTINUED | OUTPATIENT
Start: 2021-09-28 | End: 2021-09-28 | Stop reason: HOSPADM

## 2021-09-28 RX ORDER — MIDAZOLAM HYDROCHLORIDE 1 MG/ML
1 INJECTION INTRAMUSCULAR; INTRAVENOUS
Status: DISCONTINUED | OUTPATIENT
Start: 2021-09-28 | End: 2021-09-28 | Stop reason: HOSPADM

## 2021-09-28 RX ORDER — ONDANSETRON 2 MG/ML
4 INJECTION INTRAMUSCULAR; INTRAVENOUS ONCE AS NEEDED
Status: DISCONTINUED | OUTPATIENT
Start: 2021-09-28 | End: 2021-09-28 | Stop reason: HOSPADM

## 2021-09-28 RX ORDER — LIDOCAINE HYDROCHLORIDE 10 MG/ML
0.5 INJECTION, SOLUTION EPIDURAL; INFILTRATION; INTRACAUDAL; PERINEURAL ONCE AS NEEDED
Status: DISCONTINUED | OUTPATIENT
Start: 2021-09-28 | End: 2021-09-28 | Stop reason: HOSPADM

## 2021-09-28 RX ORDER — FAMOTIDINE 10 MG/ML
20 INJECTION, SOLUTION INTRAVENOUS ONCE
Status: COMPLETED | OUTPATIENT
Start: 2021-09-28 | End: 2021-09-28

## 2021-09-28 RX ORDER — HYDRALAZINE HYDROCHLORIDE 20 MG/ML
5 INJECTION INTRAMUSCULAR; INTRAVENOUS
Status: DISCONTINUED | OUTPATIENT
Start: 2021-09-28 | End: 2021-09-28 | Stop reason: HOSPADM

## 2021-09-28 RX ORDER — ASPIRIN 325 MG
325 TABLET ORAL DAILY
Status: DISCONTINUED | OUTPATIENT
Start: 2021-09-29 | End: 2021-10-03

## 2021-09-28 RX ORDER — GLYCOPYRROLATE 0.2 MG/ML
INJECTION INTRAMUSCULAR; INTRAVENOUS AS NEEDED
Status: DISCONTINUED | OUTPATIENT
Start: 2021-09-28 | End: 2021-09-28 | Stop reason: SURG

## 2021-09-28 RX ORDER — ICOSAPENT ETHYL 1000 MG/1
2 CAPSULE ORAL 2 TIMES DAILY WITH MEALS
Status: DISCONTINUED | OUTPATIENT
Start: 2021-09-29 | End: 2021-10-07 | Stop reason: HOSPADM

## 2021-09-28 RX ORDER — LIDOCAINE HYDROCHLORIDE 20 MG/ML
INJECTION, SOLUTION INFILTRATION; PERINEURAL AS NEEDED
Status: DISCONTINUED | OUTPATIENT
Start: 2021-09-28 | End: 2021-09-28 | Stop reason: SURG

## 2021-09-28 RX ORDER — PROPOFOL 10 MG/ML
VIAL (ML) INTRAVENOUS AS NEEDED
Status: DISCONTINUED | OUTPATIENT
Start: 2021-09-28 | End: 2021-09-28 | Stop reason: SURG

## 2021-09-28 RX ORDER — ROCURONIUM BROMIDE 10 MG/ML
INJECTION, SOLUTION INTRAVENOUS AS NEEDED
Status: DISCONTINUED | OUTPATIENT
Start: 2021-09-28 | End: 2021-09-28 | Stop reason: SURG

## 2021-09-28 RX ORDER — ALBUTEROL SULFATE 90 UG/1
AEROSOL, METERED RESPIRATORY (INHALATION) AS NEEDED
Status: DISCONTINUED | OUTPATIENT
Start: 2021-09-28 | End: 2021-09-28 | Stop reason: SURG

## 2021-09-28 RX ORDER — OXYCODONE AND ACETAMINOPHEN 10; 325 MG/1; MG/1
1 TABLET ORAL EVERY 4 HOURS PRN
Status: DISCONTINUED | OUTPATIENT
Start: 2021-09-28 | End: 2021-09-28 | Stop reason: HOSPADM

## 2021-09-28 RX ORDER — BUPIVACAINE HYDROCHLORIDE AND EPINEPHRINE 5; 5 MG/ML; UG/ML
INJECTION, SOLUTION EPIDURAL; INTRACAUDAL; PERINEURAL AS NEEDED
Status: DISCONTINUED | OUTPATIENT
Start: 2021-09-28 | End: 2021-09-28 | Stop reason: HOSPADM

## 2021-09-28 RX ORDER — DEXAMETHASONE SODIUM PHOSPHATE 10 MG/ML
INJECTION INTRAMUSCULAR; INTRAVENOUS AS NEEDED
Status: DISCONTINUED | OUTPATIENT
Start: 2021-09-28 | End: 2021-09-28 | Stop reason: SURG

## 2021-09-28 RX ADMIN — DEXAMETHASONE SODIUM PHOSPHATE 10 MG: 10 INJECTION INTRAMUSCULAR; INTRAVENOUS at 11:02

## 2021-09-28 RX ADMIN — MIDAZOLAM 1 MG: 1 INJECTION INTRAMUSCULAR; INTRAVENOUS at 10:33

## 2021-09-28 RX ADMIN — INSULIN LISPRO 4 UNITS: 100 INJECTION, SOLUTION INTRAVENOUS; SUBCUTANEOUS at 17:16

## 2021-09-28 RX ADMIN — MONTELUKAST SODIUM 10 MG: 10 TABLET, FILM COATED ORAL at 20:17

## 2021-09-28 RX ADMIN — CEFAZOLIN SODIUM 2 G: 2 INJECTION, SOLUTION INTRAVENOUS at 20:15

## 2021-09-28 RX ADMIN — SODIUM CHLORIDE, PRESERVATIVE FREE 10 ML: 5 INJECTION INTRAVENOUS at 20:17

## 2021-09-28 RX ADMIN — PROPOFOL 150 MG: 10 INJECTION, EMULSION INTRAVENOUS at 10:52

## 2021-09-28 RX ADMIN — SODIUM CHLORIDE, POTASSIUM CHLORIDE, SODIUM LACTATE AND CALCIUM CHLORIDE 9 ML/HR: 600; 310; 30; 20 INJECTION, SOLUTION INTRAVENOUS at 10:33

## 2021-09-28 RX ADMIN — OXYCODONE AND ACETAMINOPHEN 2 TABLET: 5; 325 TABLET ORAL at 21:40

## 2021-09-28 RX ADMIN — OXYCODONE AND ACETAMINOPHEN 2 TABLET: 5; 325 TABLET ORAL at 17:22

## 2021-09-28 RX ADMIN — MORPHINE SULFATE 1 MG: 2 INJECTION, SOLUTION INTRAMUSCULAR; INTRAVENOUS at 02:54

## 2021-09-28 RX ADMIN — FENTANYL CITRATE 50 MCG: 0.05 INJECTION, SOLUTION INTRAMUSCULAR; INTRAVENOUS at 11:05

## 2021-09-28 RX ADMIN — ALBUTEROL SULFATE 2 PUFF: 90 AEROSOL, METERED RESPIRATORY (INHALATION) at 11:56

## 2021-09-28 RX ADMIN — LIDOCAINE HYDROCHLORIDE 100 MG: 20 INJECTION, SOLUTION INFILTRATION; PERINEURAL at 10:52

## 2021-09-28 RX ADMIN — ONDANSETRON HYDROCHLORIDE 4 MG: 2 SOLUTION INTRAMUSCULAR; INTRAVENOUS at 11:38

## 2021-09-28 RX ADMIN — FENTANYL CITRATE 50 MCG: 50 INJECTION INTRAMUSCULAR; INTRAVENOUS at 12:47

## 2021-09-28 RX ADMIN — SODIUM CHLORIDE 75 ML/HR: 9 INJECTION, SOLUTION INTRAVENOUS at 17:16

## 2021-09-28 RX ADMIN — FAMOTIDINE 20 MG: 10 INJECTION INTRAVENOUS at 10:32

## 2021-09-28 RX ADMIN — CEFAZOLIN SODIUM 2 G: 2 INJECTION, SOLUTION INTRAVENOUS at 11:01

## 2021-09-28 RX ADMIN — FENTANYL CITRATE 50 MCG: 0.05 INJECTION, SOLUTION INTRAMUSCULAR; INTRAVENOUS at 10:51

## 2021-09-28 RX ADMIN — NEOSTIGMINE METHYLSULFATE 3 MG: 0.5 INJECTION INTRAVENOUS at 11:40

## 2021-09-28 RX ADMIN — HYDROMORPHONE HYDROCHLORIDE 0.5 MG: 2 INJECTION, SOLUTION INTRAMUSCULAR; INTRAVENOUS; SUBCUTANEOUS at 11:48

## 2021-09-28 RX ADMIN — SODIUM CHLORIDE 75 ML/HR: 9 INJECTION, SOLUTION INTRAVENOUS at 00:33

## 2021-09-28 RX ADMIN — CEFAZOLIN SODIUM 2 G: 2 INJECTION, SOLUTION INTRAVENOUS at 10:35

## 2021-09-28 RX ADMIN — ROCURONIUM BROMIDE 30 MG: 50 INJECTION INTRAVENOUS at 10:52

## 2021-09-28 RX ADMIN — SODIUM CHLORIDE 75 ML/HR: 9 INJECTION, SOLUTION INTRAVENOUS at 13:37

## 2021-09-28 RX ADMIN — PROPOFOL 50 MG: 10 INJECTION, EMULSION INTRAVENOUS at 10:59

## 2021-09-28 RX ADMIN — GLYCOPYRROLATE 0.3 MG: 0.2 INJECTION INTRAMUSCULAR; INTRAVENOUS at 11:40

## 2021-09-28 NOTE — ANESTHESIA PROCEDURE NOTES
Airway  Urgency: elective    Date/Time: 9/28/2021 10:57 AM  Airway not difficult    General Information and Staff    Patient location during procedure: OR  Anesthesiologist: Eloy Chahal MD  CRNA: Josue Soares CRNA    Indications and Patient Condition  Indications for airway management: airway protection    Preoxygenated: yes  MILS maintained throughout  Mask difficulty assessment: 2 - vent by mask + OA or adjuvant +/- NMBA    Final Airway Details  Final airway type: endotracheal airway      Successful airway: ETT  Cuffed: yes   Successful intubation technique: direct laryngoscopy  Facilitating devices/methods: intubating stylet  Endotracheal tube insertion site: oral  Blade: Cindy  Blade size: 3  ETT size (mm): 7.0  Cormack-Lehane Classification: grade I - full view of glottis  Placement verified by: chest auscultation and capnometry   Measured from: gums  ETT/EBT to gums (cm): 21  Number of attempts at approach: 1  Assessment: lips, teeth, and gum same as pre-op and atraumatic intubation

## 2021-09-28 NOTE — ANESTHESIA PREPROCEDURE EVALUATION
Anesthesia Evaluation     Patient summary reviewed and Nursing notes reviewed   history of anesthetic complications: PONV               Airway   Mallampati: II  TM distance: >3 FB  Neck ROM: full  No difficulty expected  Dental      Pulmonary    (+) pneumonia , asthma,  Cardiovascular     ECG reviewed  Rate: normal    (+) hyperlipidemia,       Neuro/Psych- negative ROS  GI/Hepatic/Renal/Endo    (+)  GERD,  diabetes mellitus type 2,     Musculoskeletal     Abdominal    Substance History - negative use     OB/GYN negative ob/gyn ROS         Other   arthritis,                      Anesthesia Plan    ASA 2     general   (I have reviewed the patient's history with the patient and the chart, including all pertinent laboratory results and imaging. I have explained the risks of anesthesia including but not limited to dental damage, corneal abrasion, nerve injury, MI, stroke, and death. Questions asked and answered. Anesthetic plan discussed with patient and team as indicated. Patient expressed understanding of the above.      No blocks per surgeon's request  )  intravenous induction     Anesthetic plan, all risks, benefits, and alternatives have been provided, discussed and informed consent has been obtained with: patient.

## 2021-09-28 NOTE — ANESTHESIA POSTPROCEDURE EVALUATION
"Patient: Shanika Velez    Procedure Summary     Date: 09/28/21 Room / Location: [facility] OR  / [facility] MAIN OR    Anesthesia Start: 1043 Anesthesia Stop: 1221    Procedure: TIBIAL PLATEAU OPEN REDUCTION INTERNAL FIXATION (Right Leg Lower) Diagnosis:       Closed fracture of right tibial plateau, initial encounter      (Closed fracture of right tibial plateau, initial encounter [S82.141A])    Surgeons: Eitan Tomlinson II, MD Provider: Eloy Chahal MD    Anesthesia Type: general ASA Status: 2          Anesthesia Type: general    Vitals  Vitals Value Taken Time   /77 09/28/21 1321   Temp 37.7 °C (99.9 °F) 09/28/21 1315   Pulse 114 09/28/21 1328   Resp 16 09/28/21 1315   SpO2 91 % 09/28/21 1328   Vitals shown include unvalidated device data.        Post Anesthesia Care and Evaluation    Patient location during evaluation: bedside  Patient participation: complete - patient participated  Level of consciousness: awake  Pain management: adequate  Airway patency: patent  Anesthetic complications: No anesthetic complications  PONV Status: controlled  Cardiovascular status: acceptable  Respiratory status: acceptable  Hydration status: acceptable    Comments: /89 (BP Location: Left arm, Patient Position: Lying)   Pulse 118   Temp 37.7 °C (99.9 °F) (Oral)   Resp 16   Ht 175.3 cm (69\")   Wt 85.3 kg (188 lb)   SpO2 92%   BMI 27.76 kg/m²         "

## 2021-09-29 LAB
ANION GAP SERPL CALCULATED.3IONS-SCNC: 11.3 MMOL/L (ref 5–15)
BUN SERPL-MCNC: 10 MG/DL (ref 8–23)
BUN/CREAT SERPL: 18.2 (ref 7–25)
CALCIUM SPEC-SCNC: 9.3 MG/DL (ref 8.6–10.5)
CHLORIDE SERPL-SCNC: 106 MMOL/L (ref 98–107)
CO2 SERPL-SCNC: 23.7 MMOL/L (ref 22–29)
CREAT SERPL-MCNC: 0.55 MG/DL (ref 0.57–1)
DEPRECATED RDW RBC AUTO: 44.6 FL (ref 37–54)
ERYTHROCYTE [DISTWIDTH] IN BLOOD BY AUTOMATED COUNT: 13.7 % (ref 12.3–15.4)
GFR SERPL CREATININE-BSD FRML MDRD: 112 ML/MIN/1.73
GLUCOSE BLDC GLUCOMTR-MCNC: 154 MG/DL (ref 70–130)
GLUCOSE BLDC GLUCOMTR-MCNC: 161 MG/DL (ref 70–130)
GLUCOSE BLDC GLUCOMTR-MCNC: 175 MG/DL (ref 70–130)
GLUCOSE BLDC GLUCOMTR-MCNC: 258 MG/DL (ref 70–130)
GLUCOSE SERPL-MCNC: 154 MG/DL (ref 65–99)
HCT VFR BLD AUTO: 32.6 % (ref 34–46.6)
HGB BLD-MCNC: 10.8 G/DL (ref 12–15.9)
MCH RBC QN AUTO: 29.3 PG (ref 26.6–33)
MCHC RBC AUTO-ENTMCNC: 33.1 G/DL (ref 31.5–35.7)
MCV RBC AUTO: 88.3 FL (ref 79–97)
PLATELET # BLD AUTO: 211 10*3/MM3 (ref 140–450)
PMV BLD AUTO: 10.4 FL (ref 6–12)
POTASSIUM SERPL-SCNC: 4.4 MMOL/L (ref 3.5–5.2)
RBC # BLD AUTO: 3.69 10*6/MM3 (ref 3.77–5.28)
SODIUM SERPL-SCNC: 141 MMOL/L (ref 136–145)
WBC # BLD AUTO: 10.19 10*3/MM3 (ref 3.4–10.8)

## 2021-09-29 PROCEDURE — 63710000001 INSULIN LISPRO (HUMAN) PER 5 UNITS: Performed by: ORTHOPAEDIC SURGERY

## 2021-09-29 PROCEDURE — 25010000003 CEFAZOLIN IN DEXTROSE 2-4 GM/100ML-% SOLUTION: Performed by: ORTHOPAEDIC SURGERY

## 2021-09-29 PROCEDURE — 82962 GLUCOSE BLOOD TEST: CPT

## 2021-09-29 PROCEDURE — 97110 THERAPEUTIC EXERCISES: CPT

## 2021-09-29 PROCEDURE — 80048 BASIC METABOLIC PNL TOTAL CA: CPT | Performed by: STUDENT IN AN ORGANIZED HEALTH CARE EDUCATION/TRAINING PROGRAM

## 2021-09-29 PROCEDURE — 85027 COMPLETE CBC AUTOMATED: CPT | Performed by: STUDENT IN AN ORGANIZED HEALTH CARE EDUCATION/TRAINING PROGRAM

## 2021-09-29 PROCEDURE — 97162 PT EVAL MOD COMPLEX 30 MIN: CPT

## 2021-09-29 RX ADMIN — ASPIRIN 325 MG: 325 TABLET ORAL at 08:43

## 2021-09-29 RX ADMIN — SODIUM CHLORIDE, PRESERVATIVE FREE 10 ML: 5 INJECTION INTRAVENOUS at 21:55

## 2021-09-29 RX ADMIN — ICOSAPENT ETHYL 2 G: 1000 CAPSULE ORAL at 17:57

## 2021-09-29 RX ADMIN — INSULIN LISPRO 2 UNITS: 100 INJECTION, SOLUTION INTRAVENOUS; SUBCUTANEOUS at 11:45

## 2021-09-29 RX ADMIN — DOCUSATE SODIUM 50MG AND SENNOSIDES 8.6MG 1 TABLET: 8.6; 5 TABLET, FILM COATED ORAL at 08:42

## 2021-09-29 RX ADMIN — INSULIN LISPRO 6 UNITS: 100 INJECTION, SOLUTION INTRAVENOUS; SUBCUTANEOUS at 17:56

## 2021-09-29 RX ADMIN — PANTOPRAZOLE SODIUM 40 MG: 40 TABLET, DELAYED RELEASE ORAL at 08:42

## 2021-09-29 RX ADMIN — OXYCODONE AND ACETAMINOPHEN 1 TABLET: 5; 325 TABLET ORAL at 18:16

## 2021-09-29 RX ADMIN — OXYCODONE AND ACETAMINOPHEN 1 TABLET: 5; 325 TABLET ORAL at 21:54

## 2021-09-29 RX ADMIN — MONTELUKAST SODIUM 10 MG: 10 TABLET, FILM COATED ORAL at 21:53

## 2021-09-29 RX ADMIN — CEFAZOLIN SODIUM 2 G: 2 INJECTION, SOLUTION INTRAVENOUS at 11:45

## 2021-09-29 RX ADMIN — CEFAZOLIN SODIUM 2 G: 2 INJECTION, SOLUTION INTRAVENOUS at 04:01

## 2021-09-29 RX ADMIN — INSULIN LISPRO 2 UNITS: 100 INJECTION, SOLUTION INTRAVENOUS; SUBCUTANEOUS at 08:43

## 2021-09-29 RX ADMIN — LEVOTHYROXINE SODIUM 112 MCG: 0.11 TABLET ORAL at 08:43

## 2021-09-29 RX ADMIN — SODIUM CHLORIDE, PRESERVATIVE FREE 10 ML: 5 INJECTION INTRAVENOUS at 08:46

## 2021-09-29 RX ADMIN — ATORVASTATIN CALCIUM 10 MG: 20 TABLET, FILM COATED ORAL at 08:43

## 2021-09-29 RX ADMIN — ICOSAPENT ETHYL 2 G: 1000 CAPSULE ORAL at 08:44

## 2021-09-29 RX ADMIN — CETIRIZINE HYDROCHLORIDE 10 MG: 10 TABLET ORAL at 08:43

## 2021-09-29 RX ADMIN — ALLOPURINOL 200 MG: 100 TABLET ORAL at 08:43

## 2021-09-30 LAB
GLUCOSE BLDC GLUCOMTR-MCNC: 127 MG/DL (ref 70–130)
GLUCOSE BLDC GLUCOMTR-MCNC: 147 MG/DL (ref 70–130)
GLUCOSE BLDC GLUCOMTR-MCNC: 157 MG/DL (ref 70–130)
GLUCOSE BLDC GLUCOMTR-MCNC: 184 MG/DL (ref 70–130)

## 2021-09-30 PROCEDURE — 97110 THERAPEUTIC EXERCISES: CPT

## 2021-09-30 PROCEDURE — 82962 GLUCOSE BLOOD TEST: CPT

## 2021-09-30 PROCEDURE — 63710000001 INSULIN LISPRO (HUMAN) PER 5 UNITS: Performed by: ORTHOPAEDIC SURGERY

## 2021-09-30 PROCEDURE — 94799 UNLISTED PULMONARY SVC/PX: CPT

## 2021-09-30 RX ORDER — IODINE/SODIUM IODIDE 2 %
TINCTURE TOPICAL EVERY 8 HOURS PRN
Status: CANCELLED
Start: 2021-09-30

## 2021-09-30 RX ORDER — POLYETHYLENE GLYCOL 3350 17 G/17G
17 POWDER, FOR SOLUTION ORAL DAILY
Status: DISCONTINUED | OUTPATIENT
Start: 2021-09-30 | End: 2021-10-02

## 2021-09-30 RX ORDER — AMOXICILLIN 250 MG
2 CAPSULE ORAL 2 TIMES DAILY
Status: DISCONTINUED | OUTPATIENT
Start: 2021-09-30 | End: 2021-10-06

## 2021-09-30 RX ORDER — AMOXICILLIN 250 MG
1 CAPSULE ORAL 2 TIMES DAILY
Status: CANCELLED
Start: 2021-09-30

## 2021-09-30 RX ORDER — ASPIRIN 325 MG
325 TABLET ORAL DAILY
Status: CANCELLED
Start: 2021-10-01

## 2021-09-30 RX ORDER — OXYCODONE HYDROCHLORIDE AND ACETAMINOPHEN 5; 325 MG/1; MG/1
1 TABLET ORAL EVERY 4 HOURS PRN
Qty: 18 TABLET | Refills: 0 | Status: CANCELLED | OUTPATIENT
Start: 2021-09-30 | End: 2021-10-03

## 2021-09-30 RX ORDER — ALLOPURINOL 100 MG/1
200 TABLET ORAL DAILY
Status: CANCELLED
Start: 2021-09-30

## 2021-09-30 RX ORDER — BISACODYL 10 MG
10 SUPPOSITORY, RECTAL RECTAL DAILY PRN
Status: DISCONTINUED | OUTPATIENT
Start: 2021-09-30 | End: 2021-10-07 | Stop reason: HOSPADM

## 2021-09-30 RX ADMIN — POLYETHYLENE GLYCOL 3350 17 G: 17 POWDER, FOR SOLUTION ORAL at 09:52

## 2021-09-30 RX ADMIN — OXYCODONE AND ACETAMINOPHEN 1 TABLET: 5; 325 TABLET ORAL at 04:00

## 2021-09-30 RX ADMIN — OXYCODONE AND ACETAMINOPHEN 2 TABLET: 5; 325 TABLET ORAL at 15:49

## 2021-09-30 RX ADMIN — DOCUSATE SODIUM 50MG AND SENNOSIDES 8.6MG 1 TABLET: 8.6; 5 TABLET, FILM COATED ORAL at 09:02

## 2021-09-30 RX ADMIN — ATORVASTATIN CALCIUM 10 MG: 20 TABLET, FILM COATED ORAL at 09:01

## 2021-09-30 RX ADMIN — ICOSAPENT ETHYL 2 G: 1000 CAPSULE ORAL at 09:02

## 2021-09-30 RX ADMIN — INSULIN LISPRO 2 UNITS: 100 INJECTION, SOLUTION INTRAVENOUS; SUBCUTANEOUS at 16:22

## 2021-09-30 RX ADMIN — LEVOTHYROXINE SODIUM 112 MCG: 0.11 TABLET ORAL at 09:03

## 2021-09-30 RX ADMIN — SODIUM CHLORIDE, PRESERVATIVE FREE 10 ML: 5 INJECTION INTRAVENOUS at 09:03

## 2021-09-30 RX ADMIN — PANTOPRAZOLE SODIUM 40 MG: 40 TABLET, DELAYED RELEASE ORAL at 09:01

## 2021-09-30 RX ADMIN — ERGOCALCIFEROL 50000 UNITS: 1.25 CAPSULE ORAL at 09:03

## 2021-09-30 RX ADMIN — SODIUM CHLORIDE, PRESERVATIVE FREE 10 ML: 5 INJECTION INTRAVENOUS at 21:00

## 2021-09-30 RX ADMIN — CETIRIZINE HYDROCHLORIDE 10 MG: 10 TABLET ORAL at 09:03

## 2021-09-30 RX ADMIN — BISACODYL 10 MG: 10 SUPPOSITORY RECTAL at 13:45

## 2021-09-30 RX ADMIN — ICOSAPENT ETHYL 2 G: 1000 CAPSULE ORAL at 15:50

## 2021-09-30 RX ADMIN — MONTELUKAST SODIUM 10 MG: 10 TABLET, FILM COATED ORAL at 20:58

## 2021-09-30 RX ADMIN — OXYCODONE AND ACETAMINOPHEN 1 TABLET: 5; 325 TABLET ORAL at 20:58

## 2021-09-30 RX ADMIN — DOCUSATE SODIUM 50MG AND SENNOSIDES 8.6MG 2 TABLET: 8.6; 5 TABLET, FILM COATED ORAL at 20:58

## 2021-10-01 LAB
GLUCOSE BLDC GLUCOMTR-MCNC: 119 MG/DL (ref 70–130)
GLUCOSE BLDC GLUCOMTR-MCNC: 126 MG/DL (ref 70–130)
GLUCOSE BLDC GLUCOMTR-MCNC: 132 MG/DL (ref 70–130)
GLUCOSE BLDC GLUCOMTR-MCNC: 175 MG/DL (ref 70–130)

## 2021-10-01 PROCEDURE — 97110 THERAPEUTIC EXERCISES: CPT

## 2021-10-01 PROCEDURE — 82962 GLUCOSE BLOOD TEST: CPT

## 2021-10-01 RX ADMIN — DOCUSATE SODIUM 50MG AND SENNOSIDES 8.6MG 2 TABLET: 8.6; 5 TABLET, FILM COATED ORAL at 21:11

## 2021-10-01 RX ADMIN — SODIUM CHLORIDE, PRESERVATIVE FREE 10 ML: 5 INJECTION INTRAVENOUS at 08:07

## 2021-10-01 RX ADMIN — ATORVASTATIN CALCIUM 10 MG: 20 TABLET, FILM COATED ORAL at 08:06

## 2021-10-01 RX ADMIN — ASPIRIN 325 MG: 325 TABLET ORAL at 08:06

## 2021-10-01 RX ADMIN — FLUTICASONE PROPIONATE 1 SPRAY: 50 SPRAY, METERED NASAL at 08:06

## 2021-10-01 RX ADMIN — OXYCODONE AND ACETAMINOPHEN 1 TABLET: 5; 325 TABLET ORAL at 08:06

## 2021-10-01 RX ADMIN — DOCUSATE SODIUM 50MG AND SENNOSIDES 8.6MG 2 TABLET: 8.6; 5 TABLET, FILM COATED ORAL at 08:06

## 2021-10-01 RX ADMIN — LEVOTHYROXINE SODIUM 112 MCG: 0.11 TABLET ORAL at 08:06

## 2021-10-01 RX ADMIN — PANTOPRAZOLE SODIUM 40 MG: 40 TABLET, DELAYED RELEASE ORAL at 08:06

## 2021-10-01 RX ADMIN — CETIRIZINE HYDROCHLORIDE 10 MG: 10 TABLET ORAL at 08:07

## 2021-10-01 RX ADMIN — MONTELUKAST SODIUM 10 MG: 10 TABLET, FILM COATED ORAL at 21:11

## 2021-10-01 RX ADMIN — ICOSAPENT ETHYL 2 G: 1000 CAPSULE ORAL at 08:07

## 2021-10-01 RX ADMIN — OXYCODONE AND ACETAMINOPHEN 1 TABLET: 5; 325 TABLET ORAL at 18:22

## 2021-10-01 RX ADMIN — POLYETHYLENE GLYCOL 3350 17 G: 17 POWDER, FOR SOLUTION ORAL at 08:06

## 2021-10-01 RX ADMIN — SODIUM CHLORIDE, PRESERVATIVE FREE 10 ML: 5 INJECTION INTRAVENOUS at 21:00

## 2021-10-01 RX ADMIN — ICOSAPENT ETHYL 2 G: 1000 CAPSULE ORAL at 18:22

## 2021-10-01 NOTE — PLAN OF CARE
Goal Outcome Evaluation:  Plan of Care Reviewed With: patient        Progress: improving  Outcome Summary: Pt tolerated treatment well this date. Increased gait distance to 10ft + 25ft w/ walker and SBA. No LOB noted throughout. Instructed pt on several supine LE exercises w/ KI on during session.

## 2021-10-01 NOTE — PLAN OF CARE
Goal Outcome Evaluation:  Plan of Care Reviewed With: patient        Progress: improving  Outcome Summary: A&Ox4. VSS. NVI. APPLE dressing CDI, KI in place. Working with PT. Up with assist x1 and walker, NWB to RLE. Minimal c/o pain. Voiding function intact. BM this shift. Plans to D/C to SNF pending precert. Verbalized understanding of all education. Will cont to monitor.

## 2021-10-01 NOTE — THERAPY TREATMENT NOTE
Patient Name: Shanika Velez  : 1960    MRN: [id number]                              Today's Date: 10/1/2021       Admit Date: 2021    Visit Dx:     ICD-10-CM ICD-9-CM   1. Closed fracture of right tibial plateau, initial encounter  S82.141A 823.00   2. Tibial plateau fracture, right, closed, initial encounter  S82.141A 823.00   3. Vitamin D deficiency  E55.9 268.9   4. Acquired hypothyroidism  E03.9 244.9     Patient Active Problem List   Diagnosis   • Dyslipidemia   • Hypothyroidism   • Type 2 diabetes mellitus (HCC)   • Vitamin D deficiency   • Atopic rhinitis   • Asthma   • Osteoarthritis   • GERD (gastroesophageal reflux disease)   • Diverticulosis   • Hyperlipidemia   • Inflammatory bowel disease   • Tibial plateau fracture, right, closed, initial encounter     Past Medical History:   Diagnosis Date   • Dyslipidemia    • Hyperlipidemia    • Hypothyroidism    • Pneumonia    • PONV (postoperative nausea and vomiting)    • Type 2 diabetes mellitus (CMS/HCC)    • Vitamin D deficiency      Past Surgical History:   Procedure Laterality Date   • APPENDECTOMY     • CHOLECYSTECTOMY  UNKNOWN   • TIBIAL PLATEAU OPEN REDUCTION INTERNAL FIXATION Right 2021    Procedure: TIBIAL PLATEAU OPEN REDUCTION INTERNAL FIXATION;  Surgeon: Eitan Tomlinson II, MD;  Location: [facility];  Service: Orthopedics;  Laterality: Right;     General Information     Row Name 10/01/21 1515          Physical Therapy Time and Intention    Document Type  therapy note (daily note)  -     Mode of Treatment  physical therapy  -     Row Name 10/01/21 1515          General Information    Existing Precautions/Restrictions  fall;non-weight bearing KI on R LE  -     Row Name 10/01/21 1515          Cognition    Orientation Status (Cognition)  oriented x 4  -       User Key  (r) = Recorded By, (t) = Taken By, (c) = Cosigned By    Initials Name Provider Type     Janeth Warren, PTA Physical Therapy Assistant         Mobility     [facility] Name 10/01/21 1517          Bed Mobility    Bed Mobility  supine-sit;sit-supine  -SM     Supine-Sit Terry (Bed Mobility)  modified independence  -SM     Sit-Supine Terry (Bed Mobility)  modified independence  -SM     Assistive Device (Bed Mobility)  bed rails;head of bed elevated  -[facility] Name 10/01/21 1517          Sit-Stand Transfer    Sit-Stand Terry (Transfers)  standby assist  -     Assistive Device (Sit-Stand Transfers)  walker, standard  -SM     Row Name 10/01/21 1517          Gait/Stairs (Locomotion)    Terry Level (Gait)  standby assist  -     Assistive Device (Gait)  walker, standard  -     Distance in Feet (Gait)  10ft, then 25ft  -     Deviations/Abnormal Patterns (Gait)  gretchen decreased;stride length decreased  -     Bilateral Gait Deviations  forward flexed posture  -     Right Sided Gait Deviations  weight shift ability decreased NWB  -       User Key  (r) = Recorded By, (t) = Taken By, (c) = Cosigned By    Initials Name Provider Type    Janeth Norman PTA Physical Therapy Assistant        Obj/Interventions     [facility] Name 10/01/21 1526          Motor Skills    Therapeutic Exercise  -- supine AP, QS, GS, hip abd/add, SLR x20 reps  -       User Key  (r) = Recorded By, (t) = Taken By, (c) = Cosigned By    Initials Name Provider Type    Janeth Norman PTA Physical Therapy Assistant        Goals/Plan    No documentation.       Clinical Impression     [facility] Name 10/01/21 1526          Pain    Additional Documentation  Pain Scale: Numbers Pre/Post-Treatment (Group)  -SM     Row Name 10/01/21 1526          Pain Scale: Numbers Pre/Post-Treatment    Pretreatment Pain Rating  2/10  -SM     Posttreatment Pain Rating  2/10  -SM     Pain Location - Side  Right  -     Pain Location  knee  -     Pain Intervention(s)  Repositioned;Ambulation/increased activity;Rest  -[facility] Name 10/01/21 1526          Positioning and  Restraints    Pre-Treatment Position  in bed  -SM     Post Treatment Position  bed  -SM     In Bed  supine;call light within reach;encouraged to call for assist  -SM       User Key  (r) = Recorded By, (t) = Taken By, (c) = Cosigned By    Initials Name Provider Type    Janeth Norman PTA Physical Therapy Assistant        Outcome Measures     Row Name 10/01/21 1527          How much help from another person do you currently need...    Turning from your back to your side while in flat bed without using bedrails?  3  -SM     Moving from lying on back to sitting on the side of a flat bed without bedrails?  3  -SM     Moving to and from a bed to a chair (including a wheelchair)?  3  -SM     Standing up from a chair using your arms (e.g., wheelchair, bedside chair)?  3  -SM     Climbing 3-5 steps with a railing?  2  -SM     To walk in hospital room?  3  -SM     AM-PAC 6 Clicks Score (PT)  17  -     Row Name 10/01/21 1527          Functional Assessment    Outcome Measure Options  AM-PAC 6 Clicks Basic Mobility (PT)  -       User Key  (r) = Recorded By, (t) = Taken By, (c) = Cosigned By    Initials Name Provider Type    Janeth Norman PTA Physical Therapy Assistant                       Physical Therapy Education                 Title: PT OT SLP Therapies (Done)     Topic: Physical Therapy (Done)     Point: Mobility training (Done)     Learning Progress Summary           Patient Acceptance, E,TB,D, VU,NR by  at 10/1/2021 1527    Acceptance, E,TB,D, VU,NR by  at 9/30/2021 1607    Acceptance, E,D, DU by  at 9/29/2021 1200                   Point: Home exercise program (Done)     Learning Progress Summary           Patient Acceptance, E,TB,D, VU,NR by  at 10/1/2021 1527    Acceptance, E,TB,D, VU,NR by  at 9/30/2021 1607    Acceptance, E,D, DU by  at 9/29/2021 1200                   Point: Body mechanics (Done)     Learning Progress Summary           Patient Acceptance, E,TB,D, VU,NR by  at  10/1/2021 1527    Acceptance, E,TB,D, VU,NR by  at 9/30/2021 1607    Acceptance, E,D, DU by  at 9/29/2021 1200                   Point: Precautions (Done)     Learning Progress Summary           Patient Acceptance, E,TB,D, VU,NR by  at 10/1/2021 1527    Acceptance, E,TB,D, VU,NR by  at 9/30/2021 1607    Acceptance, E,D, DU by  at 9/29/2021 1200                               User Key     Initials Effective Dates Name Provider Type [facility] 06/16/21 -  Kellee Phan PT Physical Therapist PT     03/07/18 -  Janeth Warren PTA Physical Therapy Assistant PT              PT Recommendation and Plan     Plan of Care Reviewed With: patient  Progress: improving  Outcome Summary: Pt tolerated treatment well this date. Increased gait distance to 10ft + 25ft w/ walker and SBA. No LOB noted throughout. Instructed pt on several supine LE exercises w/ KI on during session.     Time Calculation:   PT Charges     Row Name 10/01/21 1529             Time Calculation    Start Time  1442  -      Stop Time  1508  -      Time Calculation (min)  26 min  -      PT Received On  10/01/21  -      PT - Next Appointment  10/02/21  -        User Key  (r) = Recorded By, (t) = Taken By, (c) = Cosigned By    Initials Name Provider Type     Janeth Warren PTA Physical Therapy Assistant        Therapy Charges for Today     Code Description Service Date Service Provider Modifiers Qty    35025708552 HC PT THER PROC EA 15 MIN 9/30/2021 Janeth Warren PTA GP 2    38349561312 HC PT THER PROC EA 15 MIN 10/1/2021 Janeth Warren PTA GP 2          PT G-Codes  Outcome Measure Options: AM-PAC 6 Clicks Basic Mobility (PT)  AM-PAC 6 Clicks Score (PT): 17    Janeth Warren PTA  10/1/2021

## 2021-10-01 NOTE — PLAN OF CARE
Goal Outcome Evaluation:           Progress: improving   Pt is a post op day 5 of an ORIF of the rt tibia. Dressing is clean dry and intact. Pt continues with the ACE, APPLE and Knee immobilizer. Pt has ambulated to the bathroom with an assist x1. Voiding function intact. Pt is NWB to RLE. Pt is compliant with restrictions. Pt continues with PO pain meds that provide relief. Pt educated on the importance of monitoring blood sugars related to comorbidity of diabetes. Pt voiced understanding. Pt is resting at this time, will continue to monitor.

## 2021-10-01 NOTE — PAYOR COMM NOTE
"CONTINUED STAY REVIEW  REF #[id number]  F:  [phone]        Shanika Velez (60 y.o. Female)     Date of Birth Social Security Number Address Home Phone MRN    1960  [id number] [facility] [address] [phone] [id number]    Zoroastrianism Marital Status          None Single       Admission Date Admission Type Admitting Provider Attending Provider Department, Room/Bed    9/26/21 Emergency Jess Zaidi MD Snyder, Perry, MD [facility] [address], P891/1    Discharge Date Discharge Disposition Discharge Destination                       Attending Provider: Murali Sutherland MD    Allergies: Codeine, Fluoxetine Hcl, Penicillins, Tetracycline, Tetracyclines & Related    Isolation: None   Infection: COVID (History) (09/27/21)   Code Status: CPR    Ht: 175.3 cm (69\")   Wt: 85.3 kg (188 lb)    Admission Cmt: None   Principal Problem: Tibial plateau fracture, right, closed, initial encounter [S82.141A]                 Active Insurance as of 9/26/2021     Primary Coverage     Payor Plan Insurance Group Employer/Plan Group    [facility] [employer]      Payor Plan Address Payor Plan Phone Number Payor Plan Fax Number Effective Dates    [address] [address] [phone]  9/26/2021 - None Entered    [facility] [id number]       Subscriber Name Subscriber Birth Date Member ID       SHANIKA VELEZ 1960            Secondary Coverage     Payor Plan Insurance Group Employer/Plan Group    [employer] PPO [id number]     Payor Plan Address Payor Plan Phone Number Payor Plan Fax Number Effective Dates    [address] [phone]  1/1/2018 - None Entered    [facility] [id number]       Subscriber Name Subscriber Birth Date Member ID       SHANIKA VELEZ 1960 [id number]                 Emergency Contacts      (Rel.) Home Phone Work Phone Mobile Phone    Layla Ochoa (Sister) [phone] -- --            Vital Signs (last day)     Date/Time   Temp   " Temp src   Pulse   Resp   BP   Patient Position   SpO2    10/01/21 0700   97.3 (36.3)   Oral   89   18   131/81   Sitting   96    10/01/21 0401   97 (36.1)   Oral   84   18   121/74   Lying   95    09/30/21 2300   98.2 (36.8)   Oral   81   16   117/70   Lying   96    09/30/21 1900   99.6 (37.6)   Oral   101   16   120/67   Lying   93    09/30/21 1835   98.4 (36.9)   Oral   --   --   --   --   --    09/30/21 1702   96.9 (36.1)   Oral   --   --   --   --   --    09/30/21 1500   97 (36.1)   Oral   100   16   134/78   Lying   95    09/30/21 1056   98.9 (37.2)   Skin   92   16   129/74   Lying   93    09/30/21 0731   97 (36.1)   Oral   77   12   100/57   Lying   95    09/30/21 0350   97.8 (36.6)   Skin   72   16   116/66   Lying   97              Oxygen Therapy (last day)     Date/Time   SpO2   Device (Oxygen Therapy)   Flow (L/min)   Oxygen Concentration (%)   ETCO2 (mmHg)    10/01/21 0700   96   room air   --   --   --    10/01/21 0401   95   room air   --   --   --    09/30/21 2300   96   room air   --   --   --    09/30/21 2000   --   room air   --   --   --    09/30/21 1900   93   room air   --   --   --    09/30/21 1600   --   room air   --   --   --    09/30/21 1500   95   room air   --   --   --    09/30/21 1056   93   room air   --   --   --    09/30/21 0800   --   room air   --   --   --    09/30/21 0731   95   room air   --   --   --    09/30/21 0350   97   room air   --   --   --              Lines, Drains & Airways    Active LDAs     Name:   Placement date:   Placement time:   Site:   Days:    Peripheral IV 09/26/21 2135 Right Wrist   09/26/21 2135    Wrist   4    Peripheral IV 09/28/21 1315 Left Hand   09/28/21    1315    Hand   2                  Medication Administration Report for Shanika Velez as of 10/01/21 1018    Legend:    Given Hold Not Given Due Canceled Entry Other Actions    Time Time (Time) Time  Time-Action       Discontinued     Completed     Future     MAR Hold     Linked            Medications 09/30/21 10/01/21    allopurinol (ZYLOPRIM) tablet 200 mg  Dose: 200 mg  Freq: Daily Route: PO  Start: 09/27/21 0900    Admin Instructions:   (BKC) Take with food if GI upset occurs.     (0903)            (0806)               aspirin tablet 325 mg  Dose: 325 mg  Freq: Daily Route: PO  Start: 09/29/21 0900    Admin Instructions:   Do not exceed 4 grams of aspirin in a 24 hr period.    If given for pain, use the following pain scale:   Mild Pain = Pain Score of 1-3, CPOT 1-2  Moderate Pain = Pain Score of 4-6, CPOT 3-4  Severe Pain = Pain Score of 7-10, CPOT 5-8     (0901)            0806               atorvastatin (LIPITOR) tablet 10 mg  Dose: 10 mg  Freq: Daily Route: PO  Start: 09/27/21 0945    Admin Instructions:   Avoid grapefruit juice.     0901 0806               cetirizine (zyrTEC) tablet 10 mg  Dose: 10 mg  Freq: Daily Route: PO  Start: 09/27/21 0900 0903            0807               fluticasone (FLONASE) 50 MCG/ACT nasal spray 1 spray  Dose: 1 spray  Freq: Daily Route: NA  Start: 09/27/21 0900    (0907)            0806               icosapent ethyl (VASCEPA) capsule 2 g  Dose: 2 g  Freq: 2 Times Daily With Meals Route: PO  Start: 09/29/21 0800    Admin Instructions:   Swallow capsule whole.  Do not break open, crush, dissolve, or chew capsule.     0902     1550               0807     1800              insulin lispro (ADMELOG) injection 0-9 Units  Dose: 0-9 Units  Freq: 3 Times Daily Before Meals Route: SC  Start: 09/27/21 0730    Admin Instructions:   Correction - Moderate Dose.  40-60 units/day total insulin dose or average weight, on oral agents    Blood glucose 150-199 mg/dL - 2 units  Blood glucose 200-249 mg/dL - 4 units  Blood glucose 250-299 mg/dL - 6 units  Blood glucose 300-349 mg/dL - 7 units  Blood glucose 350-400 mg/dL - 8 units  Blood glucose greater than 400 mg/dL - 9 units and call provider   Caution: Look alike/sound alike drug alert     8917 [phone]) 0408  [C]              (2738) [C]     9762 0219             levothyroxine (SYNTHROID, LEVOTHROID) tablet 112 mcg  Dose: 112 mcg  Freq: Daily Route: PO  Start: 09/27/21 0900    Admin Instructions:   Take on empty stomach.     0903 0806               montelukast (SINGULAIR) tablet 10 mg  Dose: 10 mg  Freq: Nightly Route: PO  Start: 09/26/21 2145 2058 2100               pantoprazole (PROTONIX) EC tablet 40 mg  Dose: 40 mg  Freq: Daily Route: PO  Start: 09/27/21 0900    Admin Instructions:   Swallow whole; do not crush, split, or chew.     0901 0806               polyethylene glycol (MIRALAX) packet 17 g  Dose: 17 g  Freq: Daily Route: PO  Start: 09/30/21 1030    Admin Instructions:   Use 4-8 ounces of water, tea, or juice for each 17 gram dose.     0952            0806               sennosides-docusate (PERICOLACE) 8.6-50 MG per tablet 2 tablet  Dose: 2 tablet  Freq: 2 Times Daily Route: PO  Start: 09/30/21 2100 2058 0806 2100              sodium chloride 0.9 % flush 10 mL  Dose: 10 mL  Freq: Every 12 Hours Scheduled Route: IV  Start: 09/26/21 2145 0903 2100 0807 2100              vitamin D (ERGOCALCIFEROL) capsule 50,000 Units  Dose: 50,000 Units  Freq: Weekly Route: PO  Start: 09/30/21 0900    0903               Completed Medications  Medications 09/30/21 10/01/21       ceFAZolin in dextrose (ANCEF) IVPB solution 2 g  Dose: 2 g  Freq: Once Route: IV  Indications of Use: PERIOPERATIVE PHARMACOPROPHYLAXIS  Start: 09/28/21 1037   End: 09/28/21 1105    Admin Instructions:   Caution: Look alike/sound alike drug alert          ceFAZolin in dextrose (ANCEF) IVPB solution 2 g  Dose: 2 g  Freq: Every 8 Hours Route: IV  Indications of Use: PERIOPERATIVE PHARMACOPROPHYLAXIS  Start: 09/28/21 1900   End: 09/29/21 1215    Admin Instructions:   Caution: Look alike/sound alike drug alert          ceFAZolin in dextrose (ANCEF) IVPB solution 2 g  Dose: 2 g  Freq:  On Call to O.R. Route: IV  Indications of Use: PERIOPERATIVE PHARMACOPROPHYLAXIS  Start: 09/27/21 0600   End: 09/28/21 1101    Admin Instructions:   Caution: Look alike/sound alike drug alert          famotidine (PEPCID) injection 20 mg  Dose: 20 mg  Freq: Once Route: IV  Start: 09/28/21 1024   End: 09/28/21 1032    Admin Instructions:   Dilute to 10 mL total volume and give IV push over 2 minutes.          HYDROcodone-acetaminophen (NORCO) 5-325 MG per tablet 1 tablet  Dose: 1 tablet  Freq: Once Route: PO  Start: 09/26/21 1835   End: 09/26/21 1942    Admin Instructions:   [AUREA]    Do not exceed 4 grams of acetaminophen in a 24 hr period. Max dose of 2gm for AST/ALT greater than 120 units/L        If given for pain, use the following pain scale:   Mild Pain = Pain Score of 1-3, CPOT 1-2  Moderate Pain = Pain Score of 4-6, CPOT 3-4  Severe Pain = Pain Score of 7-10, CPOT 5-8         Discontinued Medications  Medications 09/30/21 10/01/21       icosapent ethyl (VASCEPA) capsule 2 g  Dose: 2 g  Freq: 2 Times Daily With Meals Route: PO  Start: 09/26/21 2145   End: 09/28/21 1756    Admin Instructions:   Swallow capsule whole.  Do not break open, crush, dissolve, or chew capsule.          levothyroxine (SYNTHROID, LEVOTHROID) tablet 112 mcg  Dose: 112 mcg  Freq: Every Early Morning Route: PO  Start: 09/27/21 0600   End: 09/26/21 2113    Admin Instructions:   Take on empty stomach.          sennosides-docusate (PERICOLACE) 8.6-50 MG per tablet 1 tablet  Dose: 1 tablet  Freq: 2 Times Daily Route: PO  Start: 09/27/21 1000   End: 09/30/21 0944    0902                sodium chloride 0.9 % flush 3 mL  Dose: 3 mL  Freq: Every 12 Hours Scheduled Route: IV  Start: 09/28/21 1024   End: 09/28/21 1221              ,   Medication Administration Report for Shanika Velez as of 10/01/21 1018    Legend:    Given Hold Not Given Due Canceled Entry Other Actions    Time Time (Time) Time  Time-Action       Discontinued     Completed     Future      MAR Hold     Linked           Medications 09/30/21 10/01/21    lactated ringers infusion  Rate: 9 mL/hr Dose: 9 mL/hr  Freq: Continuous Route: IV  Start: 09/28/21 1024        Discontinued Medications  Medications 09/30/21 10/01/21       sodium chloride 0.9 % infusion  Rate: 75 mL/hr Dose: 75 mL/hr  Freq: Continuous Route: IV  Start: 09/26/21 2145   End: 09/29/21 1037               and   Medication Administration Report for Shanika Velez as of 10/01/21 1018    Legend:    Given Hold Not Given Due Canceled Entry Other Actions    Time Time (Time) Time  Time-Action       Discontinued     Completed     Future     MAR Hold     Linked           Medications 09/30/21 10/01/21    acetaminophen (TYLENOL) tablet 650 mg  Dose: 650 mg  Freq: Every 4 Hours PRN Route: PO  PRN Reason: Mild Pain   Start: 09/26/21 2052    Admin Instructions:   Do not exceed 4 grams of acetaminophen in a 24 hr period. Max dose of 2gm for AST/ALT greater than 120 units/L      If given for pain, use the following pain scale:   Mild Pain = Pain Score of 1-3, CPOT 1-2  Moderate Pain = Pain Score of 4-6, CPOT 3-4  Severe Pain = Pain Score of 7-10, CPOT 5-8         Or  acetaminophen (TYLENOL) 160 MG/5ML solution 650 mg  Dose: 650 mg  Freq: Every 4 Hours PRN Route: PO  PRN Reason: Mild Pain   Start: 09/26/21 2052    Admin Instructions:   Do not exceed 4 grams of acetaminophen in a 24 hr period. Max dose of 2gm for AST/ALT greater than 120 units/L      If given for pain, use the following pain scale:   Mild Pain = Pain Score of 1-3, CPOT 1-2  Moderate Pain = Pain Score of 4-6, CPOT 3-4  Severe Pain = Pain Score of 7-10, CPOT 5-8         Or  acetaminophen (TYLENOL) suppository 650 mg  Dose: 650 mg  Freq: Every 4 Hours PRN Route: RE  PRN Reason: Mild Pain   Start: 09/26/21 2052    Admin Instructions:   Do not exceed 4 grams of acetaminophen in a 24 hr period. Max dose of 2gm for AST/ALT greater than 120 units/L      If given for pain, use the following pain  scale:   Mild Pain = Pain Score of 1-3, CPOT 1-2  Moderate Pain = Pain Score of 4-6, CPOT 3-4  Severe Pain = Pain Score of 7-10, CPOT 5-8          bisacodyl (DULCOLAX) suppository 10 mg  Dose: 10 mg  Freq: Daily PRN Route: RE  PRN Reason: Constipation  Start: 09/30/21 0945    1345                    calamine 8-8 % lotion  Freq: Every 8 Hours PRN Route: TOP  PRN Reason: Itching  Start: 09/27/21 1832    Admin Instructions:   Apply to affected area.          dextrose (D50W) 25 g/ 50mL Intravenous Solution 25 g  Dose: 25 g  Freq: Every 15 Minutes PRN Route: IV  PRN Reason: Low Blood Sugar  PRN Comment: Blood Sugar Less Than 70  Start: 09/26/21 2052    Admin Instructions:   Blood sugar less than 70; patient has IV access - Unresponsive, NPO or Unable To Safely Swallow          dextrose (GLUTOSE) oral gel 15 g  Dose: 15 g  Freq: Every 15 Minutes PRN Route: PO  PRN Reason: Low Blood Sugar  PRN Comment: Blood sugar less than 70  Start: 09/26/21 2052    Admin Instructions:   BS<70, Patient Alert, Is not NPO, Can safely swallow.          glucagon (human recombinant) (GLUCAGEN DIAGNOSTIC) injection 1 mg  Dose: 1 mg  Freq: Every 15 Minutes PRN Route: SC  PRN Reason: Low Blood Sugar  PRN Comment: Blood Glucose Less Than 70  Start: 09/26/21 2052    Admin Instructions:   Blood Glucose Less Than 70 - Patient Without IV Access - Unresponsive, NPO or Unable To Safely Swallow          morphine injection 1 mg  Dose: 1 mg  Freq: Every 4 Hours PRN Route: IV  PRN Reason: Moderate Pain   Start: 09/26/21 2052   End: 10/03/21 2051    Admin Instructions:   If given for pain, use the following pain scale:  Mild Pain = Pain Score of 1-3, CPOT 1-2  Moderate Pain = Pain Score of 4-6, CPOT 3-4  Severe Pain = Pain Score of 7-10, CPOT 5-8         And  naloxone (NARCAN) injection 0.4 mg  Dose: 0.4 mg  Freq: Every 5 Minutes PRN Route: IV  PRN Reason: Respiratory Depression  Start: 09/26/21 2052    Admin Instructions:   If respiratory rate is less than  8 breaths/minute or patient is difficult to arouse stop any narcotics and contact physician.   Administer slow IV push. Repeat as ordered until patient's respiratory rate is greater than 12 breaths/minute.          ondansetron (ZOFRAN) injection 4 mg  Dose: 4 mg  Freq: Every 6 Hours PRN Route: IV  PRN Reasons: Nausea,Vomiting  Start: 09/26/21 2052    Admin Instructions:   If BOTH ondansetron (ZOFRAN) and promethazine (PHENERGAN) are ordered use ondansetron first and THEN promethazine IF ondansetron is ineffective.          oxyCODONE-acetaminophen (PERCOCET) 5-325 MG per tablet 1 tablet  Dose: 1 tablet  Freq: Every 4 Hours PRN Route: PO  PRN Reason: Moderate Pain   Start: 09/27/21 0757   End: 10/04/21 0756    Admin Instructions:   [AUREA]    Do not exceed 4 grams of acetaminophen in a 24 hr period. Max dose of 2gm for AST/ALT greater than 120 units/L        If given for pain, use the following pain scale:   Mild Pain = Pain Score of 1-3, CPOT 1-2  Moderate Pain = Pain Score of 4-6, CPOT 3-4  Severe Pain = Pain Score of 7-10, CPOT 5-8     0400     2058           0806               oxyCODONE-acetaminophen (PERCOCET) 5-325 MG per tablet 2 tablet  Dose: 2 tablet  Freq: Every 4 Hours PRN Route: PO  PRN Reason: Severe Pain   Start: 09/27/21 0757   End: 10/04/21 0756    Admin Instructions:   [AUREA]    Do not exceed 4 grams of acetaminophen in a 24 hr period. Max dose of 2gm for AST/ALT greater than 120 units/L        If given for pain, use the following pain scale:   Mild Pain = Pain Score of 1-3, CPOT 1-2  Moderate Pain = Pain Score of 4-6, CPOT 3-4  Severe Pain = Pain Score of 7-10, CPOT 5-8     1549                sodium chloride 0.9 % flush 10 mL  Dose: 10 mL  Freq: As Needed Route: IV  PRN Reason: Line Care  Start: 09/26/21 2052         sodium chloride 0.9 % flush 10 mL  Dose: 10 mL  Freq: As Needed Route: IV  PRN Reason: Line Care  Start: 09/26/21 1913        Discontinued Medications  Medications 09/30/21 10/01/21        bupivacaine-EPINEPHrine PF (MARCAINE w/EPI) 0.5% -[id number] injection  Freq: As Needed  Start: 09/28/21 1200   End: 09/28/21 1220         diphenhydrAMINE (BENADRYL) capsule 25 mg  Dose: 25 mg  Freq: Every 30 Minutes PRN Route: PO  PRN Reason: Itching  PRN Comment: May repeat x 1  Indications of Use: EXTRAPYRAMIDAL REACTION,PRURITUS  Start: 09/28/21 1208   End: 09/28/21 1350    Admin Instructions:   Caution: Look alike/sound alike drug alert. This med may be ordered in other forms and routes. Before giving verify the last time the drug was given by any route/form.            diphenhydrAMINE (BENADRYL) injection 12.5 mg  Dose: 12.5 mg  Freq: Every 15 Minutes PRN Route: IV  PRN Reason: Itching  PRN Comment: May repeat x 1  Start: 09/28/21 1208   End: 09/28/21 1350    Admin Instructions:   Caution: Look alike/sound alike drug alert. This med may be ordered in other forms and routes. Before giving verify the last time the drug was given by any route/form.            ePHEDrine injection 5 mg  Dose: 5 mg  Freq: Once As Needed Route: IV  PRN Comment: symptomatic hypotension - Notify attending anesthesiologist if this needs to be given  Start: 09/28/21 1222   End: 09/28/21 1350    Admin Instructions:   Caution: Look alike/sound alike drug alert   Dilute with NS to 5-10 mg/mL.  Central line preferred, if unavailable use large bore IV access with frequent nurse monitoring of IV site.          ePHEDrine injection 5 mg  Dose: 5 mg  Freq: Once As Needed Route: IV  PRN Comment: symptomatic hypotension - Notify attending anesthesiologist if this needs to be given  Start: 09/28/21 1208   End: 09/28/21 1350    Admin Instructions:   Caution: Look alike/sound alike drug alert   Dilute with NS to 5-10 mg/mL.  Central line preferred, if unavailable use large bore IV access with frequent nurse monitoring of IV site.          fentaNYL citrate (PF) (SUBLIMAZE) injection 50 mcg  Dose: 50 mcg  Freq: Every 5 Minutes PRN Route: IV  PRN Reasons:  Moderate Pain ,Severe Pain   Start: 09/28/21 1222   End: 09/28/21 1350    Admin Instructions:   May alternate fentanyl with hydromorphone using fentanyl first.    Maximum total dose of fentanyl is 200 mcg.  If given for pain, use the following pain scale:  Mild Pain = Pain Score of 1-3, CPOT 1-2  Moderate Pain = Pain Score of 4-6, CPOT 3-4  Severe Pain = Pain Score of 7-10, CPOT 5-8          fentaNYL citrate (PF) (SUBLIMAZE) injection 50 mcg  Dose: 50 mcg  Freq: Every 10 Minutes PRN Route: IV  PRN Reason: Severe Pain   Start: 09/28/21 1022   End: 09/28/21 1221    Admin Instructions:   Maximum total dose of fentanyl is 100 mcg.  If given for pain, use the following pain scale:  Mild Pain = Pain Score of 1-3, CPOT 1-2  Moderate Pain = Pain Score of 4-6, CPOT 3-4  Severe Pain = Pain Score of 7-10, CPOT 5-8          fentaNYL citrate (PF) (SUBLIMAZE) injection 50 mcg  Dose: 50 mcg  Freq: Every 5 Minutes PRN Route: IV  PRN Reasons: Moderate Pain ,Severe Pain   Start: 09/28/21 1208   End: 09/28/21 1350    Admin Instructions:   May alternate fentanyl with hydromorphone using fentanyl first.    Maximum total dose of fentanyl is 200 mcg.  If given for pain, use the following pain scale:  Mild Pain = Pain Score of 1-3, CPOT 1-2  Moderate Pain = Pain Score of 4-6, CPOT 3-4  Severe Pain = Pain Score of 7-10, CPOT 5-8          flumazenil (ROMAZICON) injection 0.2 mg  Dose: 0.2 mg  Freq: As Needed Route: IV  PRN Comment: for benzodiazepine induced unresponsiveness or sedation  Indications of Use: BENZODIAZEPINE-INDUCED SEDATION  Start: 09/28/21 1208   End: 09/28/21 1350    Admin Instructions:   Notify Anesthesia if given  ** give IV over 15-30 seconds **          hydrALAZINE (APRESOLINE) injection 5 mg  Dose: 5 mg  Freq: Every 10 Minutes PRN Route: IV  PRN Reason: High Blood Pressure  PRN Comment: for systolic blood pressure greater than 180 mmHg or diastolic blood pressure greater than 105 mmHg  Start: 09/28/21 1208   End:  09/28/21 1350    Admin Instructions:   Up to 20 mg.  Caution: Look alike/sound alike drug alert          HYDROcodone-acetaminophen (NORCO) 5-325 MG per tablet 1 tablet  Dose: 1 tablet  Freq: Every 4 Hours PRN Route: PO  PRN Reason: Moderate Pain   Start: 09/26/21 2053   End: 09/27/21 6937    Admin Instructions:   [AUREA]    Do not exceed 4 grams of acetaminophen in a 24 hr period. Max dose of 2gm for AST/ALT greater than 120 units/L        If given for pain, use the following pain scale:   Mild Pain = Pain Score of 1-3, CPOT 1-2  Moderate Pain = Pain Score of 4-6, CPOT 3-4  Severe Pain = Pain Score of 7-10, CPOT 5-8          HYDROcodone-acetaminophen (NORCO) 7.5-325 MG per tablet 1 tablet  Dose: 1 tablet  Freq: Once As Needed Route: PO  PRN Reason: Moderate Pain   Start: 09/28/21 1222   End: 09/28/21 1350    Admin Instructions:   [AUREA]    Do not exceed 4 grams of acetaminophen in a 24 hr period. Max dose of 2gm for AST/ALT greater than 120 units/L        If given for pain, use the following pain scale:   Mild Pain = Pain Score of 1-3, CPOT 1-2  Moderate Pain = Pain Score of 4-6, CPOT 3-4  Severe Pain = Pain Score of 7-10, CPOT 5-8          HYDROmorphone (DILAUDID) injection 0.5 mg  Dose: 0.5 mg  Freq: Every 5 Minutes PRN Route: IV  PRN Reasons: Moderate Pain ,Severe Pain   Start: 09/28/21 1222   End: 09/28/21 1350    Admin Instructions:   May alternate fentanyl with hydromorphone using fentanyl first.    Maximum total dose of hydromorphone is 2 mg.  If given for pain, use the following pain scale:  Mild Pain = Pain Score of 1-3, CPOT 1-2  Moderate Pain = Pain Score of 4-6, CPOT 3-4  Severe Pain = Pain Score of 7-10, CPOT 5-8          ibuprofen (ADVIL,MOTRIN) tablet 600 mg  Dose: 600 mg  Freq: Once As Needed Route: PO  PRN Reason: Mild Pain   Start: 09/28/21 1208   End: 09/28/21 1350    Admin Instructions:   If given for pain, use the following pain scale:  Mild Pain = Pain Score of 1-3, CPOT 1-2  Moderate Pain = Pain  Score of 4-6, CPOT 3-4  Severe Pain = Pain Score of 7-10, CPOT 5-8          labetalol (NORMODYNE,TRANDATE) injection 5 mg  Dose: 5 mg  Freq: Every 5 Minutes PRN Route: IV  PRN Reason: High Blood Pressure  PRN Comment: for systolic blood pressure greater than 180 mmHg or diastolic blood pressure greater than 105 mmHg  Start: 09/28/21 1208   End: 09/28/21 1350    Admin Instructions:   Hold for heart rate less than 60.  Give by slow IV Push each 20mg (or less) over 2 minutes          lidocaine PF 1% (XYLOCAINE) injection 0.5 mL  Dose: 0.5 mL  Freq: Once As Needed Route: IJ  PRN Comment: IV Start  Start: 09/28/21 1022   End: 09/28/21 1221         midazolam (VERSED) injection 1 mg  Dose: 1 mg  Freq: Every 10 Minutes PRN Route: IV  PRN Comment: Anxiety prophylaxis, Pre-op comfort  Start: 09/28/21 1022   End: 09/28/21 1221    Admin Instructions:   May repeat dose in 10 minutes one time then contact provider for additional orders.            ondansetron (ZOFRAN) injection 4 mg  Dose: 4 mg  Freq: Once As Needed Route: IV  PRN Reasons: Nausea,Vomiting  Indications of Use: POSTOPERATIVE NAUSEA AND VOMITING  Start: 09/28/21 1208   End: 09/28/21 1350    Admin Instructions:   If BOTH ondansetron (ZOFRAN) and promethazine (PHENERGAN) are ordered use ondansetron first and THEN promethazine IF ondansetron is ineffective.          oxyCODONE-acetaminophen (PERCOCET)  MG per tablet 1 tablet  Dose: 1 tablet  Freq: Every 4 Hours PRN Route: PO  PRN Reason: Severe Pain   Start: 09/28/21 1222   End: 09/28/21 1350    Admin Instructions:   [AUREA]    Do not exceed 4 grams of acetaminophen in a 24 hr period. Max dose of 2gm for AST/ALT greater than 120 units/L        If given for pain, use the following pain scale:   Mild Pain = Pain Score of 1-3, CPOT 1-2  Moderate Pain = Pain Score of 4-6, CPOT 3-4  Severe Pain = Pain Score of 7-10, CPOT 5-8          promethazine (PHENERGAN) suppository 25 mg  Dose: 25 mg  Freq: Once As Needed Route:  RE  PRN Reasons: Nausea,Vomiting  Start: 21 1222   End: 21 1350    Admin Instructions:   If BOTH ondansetron (ZOFRAN) and promethazine (PHENERGAN) are ordered use ondansetron first and THEN promethazine IF ondansetron is ineffective.         Or  promethazine (PHENERGAN) tablet 25 mg  Dose: 25 mg  Freq: Once As Needed Route: PO  PRN Reasons: Nausea,Vomiting  Start: 21 1222   End: 21 1350    Admin Instructions:   If BOTH ondansetron (ZOFRAN) and promethazine (PHENERGAN) are ordered use ondansetron first and THEN promethazine IF ondansetron is ineffective.            promethazine (PHENERGAN) suppository 25 mg  Dose: 25 mg  Freq: Once As Needed Route: RE  PRN Reasons: Nausea,Vomiting  Start: 21 1208   End: 21 1350    Admin Instructions:   If BOTH ondansetron (ZOFRAN) and promethazine (PHENERGAN) are ordered use ondansetron first and THEN promethazine IF ondansetron is ineffective.         Or  promethazine (PHENERGAN) tablet 25 mg  Dose: 25 mg  Freq: Once As Needed Route: PO  PRN Reasons: Nausea,Vomiting  Start: 21 1208   End: 21 1350    Admin Instructions:   If BOTH ondansetron (ZOFRAN) and promethazine (PHENERGAN) are ordered use ondansetron first and THEN promethazine IF ondansetron is ineffective.           sodium chloride (NS) irrigation solution  Freq: As Needed  Start: 21 1104   End: 21 1214         sodium chloride 0.9 % flush 3-10 mL  Dose: 3-10 mL  Freq: As Needed Route: IV  PRN Reason: Line Care  Start: 21 1022   End: 21 1221         sterile water irrigation solution  Freq: As Needed  Start: 21 1104   End: 21 1214                     Physician Progress Notes       Murali Sutherland MD at 10/01/21 0901              Name: Shanika Velez ADMIT: 2021   : 1960  PCP: Jake Kidd MD    MRN: [id number] LOS: 5 days   AGE/SEX: 60 y.o. female  ROOM: [facility]     Subjective   Subjective     Had several small bowel  movements since yesterday. There was some indecision yesterday regarding whether to go to rehab, but she's settled on rehab at this point. Feels well. No acute complaints. Pain is well controlled.      Objective   Objective   Vital Signs  Temp:  [96.9 °F (36.1 °C)-99.6 °F (37.6 °C)] 97.3 °F (36.3 °C)  Heart Rate:  [] 89  Resp:  [16-18] 18  BP: (117-134)/(67-81) 131/81  SpO2:  [93 %-96 %] 96 %  on   ;   Device (Oxygen Therapy): room air  Body mass index is 27.76 kg/m².  Physical Exam  Constitutional:       General: She is not in acute distress.  Cardiovascular:      Rate and Rhythm: Normal rate and regular rhythm.      Heart sounds: Normal heart sounds.   Pulmonary:      Effort: Pulmonary effort is normal.      Breath sounds: Normal breath sounds.   Abdominal:      General: Bowel sounds are normal.      Palpations: Abdomen is soft.   Musculoskeletal:         General: Signs of injury present.      Right lower leg: No edema.      Left lower leg: No edema.      Comments: Pulses intact, right knee immobilizer in place, as well as wrap   Neurological:      Mental Status: She is alert.   Psychiatric:         Mood and Affect: Mood normal.         Behavior: Behavior normal.         Results Review     I reviewed the patient's new clinical results.  Results from last 7 days   Lab Units 09/29/21 0617 09/28/21 0622 09/27/21 0625 09/26/21 1949   WBC 10*3/mm3 10.19 7.65 7.72 10.13   HEMOGLOBIN g/dL 10.8* 10.9* 11.3* 12.5   PLATELETS 10*3/mm3 211 188 205 222     Results from last 7 days   Lab Units 09/29/21 0617 09/28/21 0622 09/27/21 0625 09/26/21 1949   SODIUM mmol/L 141 142 140 143   POTASSIUM mmol/L 4.4 4.1 3.9 3.9   CHLORIDE mmol/L 106 106 105 106   CO2 mmol/L 23.7 24.8 23.2 23.8   BUN mg/dL 10 10 9 11   CREATININE mg/dL 0.55* 0.66 0.59 0.61   GLUCOSE mg/dL 154* 130* 146* 135*   Estimated Creatinine Clearance: 125.1 mL/min (A) (by C-G formula based on SCr of 0.55 mg/dL (L)).  Results from last 7 days   Lab Units  09/27/21  0625 09/26/21 1949   ALBUMIN g/dL 4.20 4.60   BILIRUBIN mg/dL 0.4 0.4   ALK PHOS U/L 42 45   AST (SGOT) U/L 18 17   ALT (SGPT) U/L 13 17     Results from last 7 days   Lab Units 09/29/21  0617 09/28/21  0622 09/27/21 0625 09/26/21 1949   CALCIUM mg/dL 9.3 9.0 9.1 9.4   ALBUMIN g/dL  --   --  4.20 4.60       COVID19   Date Value Ref Range Status   09/26/2021 Not Detected Not Detected - Ref. Range Final     SARS-CoV-2, LAUREN   Date Value Ref Range Status   02/17/2021 Not Detected Not Detected Final     Comment:     This nucleic acid amplification test was developed and its performance  characteristics determined by [employer]. Nucleic acid  amplification tests include RT-PCR and TMA. This test has not been  FDA cleared or approved. This test has been authorized by FDA under  an Emergency Use Authorization (EUA). This test is only authorized  for the duration of time the declaration that circumstances exist  justifying the authorization of the emergency use of in vitro  diagnostic tests for detection of SARS-CoV-2 virus and/or diagnosis  of COVID-19 infection under section 564(b)(1) of the Act, 21 U.S.C.  360bbb-3(b) (1), unless the authorization is terminated or revoked  sooner.  When diagnostic testing is negative, the possibility of a false  negative result should be considered in the context of a patient's  recent exposures and the presence of clinical signs and symptoms  consistent with COVID-19. An individual without symptoms of COVID-19  and who is not shedding SARS-CoV-2 virus would expect to have a  negative (not detected) result in this assay.     Glucose   Date/Time Value Ref Range Status   10/01/2021 0636 119 70 - 130 mg/dL Final     Comment:     Meter: [id number] : [id number] Daron MILLS NA   09/30/2021 2128 147 (H) 70 - 130 mg/dL Final     Comment:     Meter: [id number] : [id number] Daron ROY. NA   09/30/2021 1609 184 (H) 70 - 130 mg/dL Final     Comment:     Meter:  [id number] : [id number] Ashleigh SANABRIA   09/30/2021 1100 157 (H) 70 - 130 mg/dL Final     Comment:     Meter: [id number] : [id number] Ashleigh SANABRIA   09/30/2021 0614 127 70 - 130 mg/dL Final     Comment:     Meter: [id number] : [id number] Guille SANABRIA   09/29/2021 2053 175 (H) 70 - 130 mg/dL Final     Comment:     Meter: [id number] : [id number] Guille Curry NA   09/29/2021 1745 258 (H) 70 - 130 mg/dL Final     Comment:     Meter: [id number] : [id number] Sarah Brantley NA       XR Tibia Fibula 2 View Right  XR TIBIA FIBULA 2 VW RIGHT-     CLINICAL INFORMATION: Lateral tibial plateau fracture.     Correlation with right knee radiographs 09/26/2021.     Fluoroscopy time 21 seconds.     2 C-arm images submitted from the operative suite.     FINDINGS: Intraoperative C-arm images demonstrate plate and screw  fixation of the lateral tibial plateau, anatomic alignment now  demonstrated.     This report was finalized on 9/28/2021 2:12 PM by Dr. Vic Singh M.D.     XR Knee 1 or 2 View Right  XR KNEE 1 OR 2 VW RIGHT-     Clinical: Lateral tibial plateau fracture, postop     FINDINGS: Plate and screw fixator now demonstrated along the lateral  aspect of the right tibia. Anatomic alignment at the site of lateral  plateau fracture. Surgical skin staples in position. Complicating  process not identified. The remainder is unremarkable.     This report was finalized on 9/28/2021 12:58 PM by Dr. Vic Singh M.D.     FL C Arm During Surgery  This procedure was auto-finalized with no dictation required.    Scheduled Medications  allopurinol, 200 mg, Oral, Daily  aspirin, 325 mg, Oral, Daily  atorvastatin, 10 mg, Oral, Daily  cetirizine, 10 mg, Oral, Daily  fluticasone, 1 spray, Nasal, Daily  icosapent ethyl, 2 g, Oral, BID With Meals  insulin lispro, 0-9 Units, Subcutaneous, TID AC  levothyroxine, 112 mcg, Oral, Daily  montelukast, 10 mg, Oral, Nightly  pantoprazole, 40 mg, Oral,  Daily  polyethylene glycol, 17 g, Oral, Daily  senna-docusate sodium, 2 tablet, Oral, BID  sodium chloride, 10 mL, Intravenous, Q12H  vitamin D, 50,000 Units, Oral, Weekly    Infusions  lactated ringers, 9 mL/hr, Last Rate: 9 mL/hr (09/28/21 1043)    Diet  Diet Regular; Cardiac, Consistent Carbohydrate      Assessment/Plan     Active Hospital Problems    Diagnosis  POA   • **Tibial plateau fracture, right, closed, initial encounter [S82.141A]  Yes   • Inflammatory bowel disease [K52.9]  Yes   • Hyperlipidemia [E78.5]  Yes   • Asthma [J45.909]  Yes   • GERD (gastroesophageal reflux disease) [K21.9]  Yes   • Type 2 diabetes mellitus (HCC) [E11.9]  Yes   • Hypothyroidism [E03.9]  Yes      Resolved Hospital Problems   No resolved problems to display.       60 y.o. female admitted with Tibial plateau fracture, right, closed, initial encounter.    · Right bicondylar tibial plateau fracture with severe depression of the lateral tibial plateau   · S/p open reduction and internal fixation of bicondylar right tibial plateau fracture by Dr. Tomlinson on 9/28/21   · Orthopedic surgery notes that she is high risk for need for future knee replacement.   · Opiates for pain control.  · Bowel regimen  · Aspirin 325 for DVT ppx  · She is nonweightbearing for 2 months  · Active range of motion for the knee only  · Needs to follow up with Dr. Tomlinson in 3 weeks  · Dm2-a1c 6.5. orals held. Glucose has largely been adequately controlled. Continue ssi.   · Hypothyroidism-synthroid  · Asthma-singular  · Hyperlipidemia-atorvastatin, vascepa  · GERD-PPI  · Vit D deficiency-D2  · aspirin 325 mg daily for DVT prophylaxis.   · Full code.  · Discussed with patient.  · Anticipate discharge to SNU facility once precertification clears      Murali Sutherland MD  [facility]  10/01/21  09:01 EDT    I wore protective equipment throughout this patient encounter including a face mask, gloves and protective eyewear.  Hand hygiene was  performed before donning protective equipment and after removal when leaving the room.        Electronically signed by Murali Sutherland MD at 10/01/21 0902           Liss Cota RN      Case Management   Case Management/Social Work   Signed   Date of Service:  09/30/21 1402   Creation Time:  09/30/21 1402            Signed             Continued Stay Note  [facility]     Patient Name: Shanika Velez              MRN: [id number]  Today's Date: 9/30/2021                     Admit Date: 9/26/2021          Discharge Plan      Row Name 09/30/21 1351           Plan     Plan  Home with family/friend support & [facility].     Patient/Family in Agreement with Plan  yes     Plan Comments  Physical Therapy eval/rec noted. Patient said she really wants to d/c home with HH PT instead of going to a SNF. She said her roommate will be home with her to help her while she recovers if needed. She requests [facility] PT. Referral sent in [facility]. Spoke with [facility] who can accept and will follow the patient. Patient said she lives in a second floor apartment - Physical Therapy will work on stairs with the patient today. However the patient said she feels comfortable navigating those stairs up to her apartment after d/c. Patient is requesting a walker for home. Updated [facility] Liaison who will discuss with Dr. Sutherland.

## 2021-10-01 NOTE — PROGRESS NOTES
Continued Stay Note  [facility]     Patient Name: Shanika Velez  MRN: [id number]  Today's Date: 10/1/2021    Admit Date: 9/26/2021    Discharge Plan     Row Name 10/01/21 1404       Plan    Plan Comments  Plan remains to d/c to SNF. Spoke with Gabriele/Signature and provided the claim number that patient gave CCP. Per Gabriele she was going to see if  had been assigned. CCP to follow.        Discharge Codes    No documentation.             Jazlyn Sanchez RN

## 2021-10-01 NOTE — PROGRESS NOTES
Name: Shanika Velez ADMIT: 2021   : 1960  PCP: Jake Kidd MD    MRN: [id number] LOS: 5 days   AGE/SEX: 60 y.o. female  ROOM: [facility]     Subjective   Subjective     Had several small bowel movements since yesterday. There was some indecision yesterday regarding whether to go to rehab, but she's settled on rehab at this point. Feels well. No acute complaints. Pain is well controlled.       Objective   Objective   Vital Signs  Temp:  [96.9 °F (36.1 °C)-99.6 °F (37.6 °C)] 97.3 °F (36.3 °C)  Heart Rate:  [] 89  Resp:  [16-18] 18  BP: (117-134)/(67-81) 131/81  SpO2:  [93 %-96 %] 96 %  on   ;   Device (Oxygen Therapy): room air  Body mass index is 27.76 kg/m².  Physical Exam  Constitutional:       General: She is not in acute distress.  Cardiovascular:      Rate and Rhythm: Normal rate and regular rhythm.      Heart sounds: Normal heart sounds.   Pulmonary:      Effort: Pulmonary effort is normal.      Breath sounds: Normal breath sounds.   Abdominal:      General: Bowel sounds are normal.      Palpations: Abdomen is soft.   Musculoskeletal:         General: Signs of injury present.      Right lower leg: No edema.      Left lower leg: No edema.      Comments: Pulses intact, right knee immobilizer in place, as well as wrap   Neurological:      Mental Status: She is alert.   Psychiatric:         Mood and Affect: Mood normal.         Behavior: Behavior normal.         Results Review     I reviewed the patient's new clinical results.  Results from last 7 days   Lab Units 21  0621   WBC 10*3/mm3 10.19 7.65 7.72 10.13   HEMOGLOBIN g/dL 10.8* 10.9* 11.3* 12.5   PLATELETS 10*3/mm3 211 188 205 222     Results from last 7 days   Lab Units 21  0621   SODIUM mmol/L 141 142 140 143   POTASSIUM mmol/L 4.4 4.1 3.9 3.9   CHLORIDE mmol/L 106 106 105 106   CO2 mmol/L 23.7 24.8 23.2 23.8   BUN mg/dL 10  10 9 11   CREATININE mg/dL 0.55* 0.66 0.59 0.61   GLUCOSE mg/dL 154* 130* 146* 135*   Estimated Creatinine Clearance: 125.1 mL/min (A) (by C-G formula based on SCr of 0.55 mg/dL (L)).  Results from last 7 days   Lab Units 09/27/21  0625 09/26/21 1949   ALBUMIN g/dL 4.20 4.60   BILIRUBIN mg/dL 0.4 0.4   ALK PHOS U/L 42 45   AST (SGOT) U/L 18 17   ALT (SGPT) U/L 13 17     Results from last 7 days   Lab Units 09/29/21  0617 09/28/21  0622 09/27/21  0625 09/26/21 1949   CALCIUM mg/dL 9.3 9.0 9.1 9.4   ALBUMIN g/dL  --   --  4.20 4.60       COVID19   Date Value Ref Range Status   09/26/2021 Not Detected Not Detected - Ref. Range Final     SARS-CoV-2, LAUREN   Date Value Ref Range Status   02/17/2021 Not Detected Not Detected Final     Comment:     This nucleic acid amplification test was developed and its performance  characteristics determined by [employer]. Nucleic acid  amplification tests include RT-PCR and TMA. This test has not been  FDA cleared or approved. This test has been authorized by FDA under  an Emergency Use Authorization (EUA). This test is only authorized  for the duration of time the declaration that circumstances exist  justifying the authorization of the emergency use of in vitro  diagnostic tests for detection of SARS-CoV-2 virus and/or diagnosis  of COVID-19 infection under section 564(b)(1) of the Act, 21 U.S.C.  360bbb-3(b) (1), unless the authorization is terminated or revoked  sooner.  When diagnostic testing is negative, the possibility of a false  negative result should be considered in the context of a patient's  recent exposures and the presence of clinical signs and symptoms  consistent with COVID-19. An individual without symptoms of COVID-19  and who is not shedding SARS-CoV-2 virus would expect to have a  negative (not detected) result in this assay.     Glucose   Date/Time Value Ref Range Status   10/01/2021 0636 119 70 - 130 mg/dL Final     Comment:     Meter: [id number]  : [id number] Daron MILLS NA   09/30/2021 2128 147 (H) 70 - 130 mg/dL Final     Comment:     Meter: [id number] : [id number] Daron ROY. NA   09/30/2021 1609 184 (H) 70 - 130 mg/dL Final     Comment:     Meter: [id number] : [id number] Ashleigh April NA 09/30/2021 1100 157 (H) 70 - 130 mg/dL Final     Comment:     Meter: [id number] : [id number] Ashleigh April NA 09/30/2021 0614 127 70 - 130 mg/dL Final     Comment:     Meter: [id number] : [id number] Guille Curry NA   09/29/2021 2053 175 (H) 70 - 130 mg/dL Final     Comment:     Meter: [id number] : [id number] Guille Curry NA   09/29/2021 1745 258 (H) 70 - 130 mg/dL Final     Comment:     Meter: [id number] : [id number] Sarah Brantley NA       XR Tibia Fibula 2 View Right  XR TIBIA FIBULA 2 VW RIGHT-     CLINICAL INFORMATION: Lateral tibial plateau fracture.     Correlation with right knee radiographs 09/26/2021.     Fluoroscopy time 21 seconds.     2 C-arm images submitted from the operative suite.     FINDINGS: Intraoperative C-arm images demonstrate plate and screw  fixation of the lateral tibial plateau, anatomic alignment now  demonstrated.     This report was finalized on 9/28/2021 2:12 PM by Dr. Vic Singh M.D.     XR Knee 1 or 2 View Right  XR KNEE 1 OR 2 VW RIGHT-     Clinical: Lateral tibial plateau fracture, postop     FINDINGS: Plate and screw fixator now demonstrated along the lateral  aspect of the right tibia. Anatomic alignment at the site of lateral  plateau fracture. Surgical skin staples in position. Complicating  process not identified. The remainder is unremarkable.     This report was finalized on 9/28/2021 12:58 PM by Dr. Vic Singh M.D.     FL C Arm During Surgery  This procedure was auto-finalized with no dictation required.    Scheduled Medications  allopurinol, 200 mg, Oral, Daily  aspirin, 325 mg, Oral, Daily  atorvastatin, 10 mg, Oral, Daily  cetirizine, 10 mg, Oral, Daily  fluticasone,  1 spray, Nasal, Daily  icosapent ethyl, 2 g, Oral, BID With Meals  insulin lispro, 0-9 Units, Subcutaneous, TID AC  levothyroxine, 112 mcg, Oral, Daily  montelukast, 10 mg, Oral, Nightly  pantoprazole, 40 mg, Oral, Daily  polyethylene glycol, 17 g, Oral, Daily  senna-docusate sodium, 2 tablet, Oral, BID  sodium chloride, 10 mL, Intravenous, Q12H  vitamin D, 50,000 Units, Oral, Weekly    Infusions  lactated ringers, 9 mL/hr, Last Rate: 9 mL/hr (09/28/21 1043)    Diet  Diet Regular; Cardiac, Consistent Carbohydrate       Assessment/Plan     Active Hospital Problems    Diagnosis  POA   • **Tibial plateau fracture, right, closed, initial encounter [S82.141A]  Yes   • Inflammatory bowel disease [K52.9]  Yes   • Hyperlipidemia [E78.5]  Yes   • Asthma [J45.909]  Yes   • GERD (gastroesophageal reflux disease) [K21.9]  Yes   • Type 2 diabetes mellitus (HCC) [E11.9]  Yes   • Hypothyroidism [E03.9]  Yes      Resolved Hospital Problems   No resolved problems to display.       60 y.o. female admitted with Tibial plateau fracture, right, closed, initial encounter.    Right bicondylar tibial plateau fracture with severe depression of the lateral tibial plateau   S/p open reduction and internal fixation of bicondylar right tibial plateau fracture by Dr. Tomlinson on 9/28/21   Orthopedic surgery notes that she is high risk for need for future knee replacement.   Opiates for pain control.  Bowel regimen  Aspirin 325 for DVT ppx  She is nonweightbearing for 2 months  Active range of motion for the knee only  Needs to follow up with Dr. Tomlinson in 3 weeks  Dm2-a1c 6.5. orals held. Glucose has largely been adequately controlled. Continue ssi.   Hypothyroidism-synthroid  Asthma-singular  Hyperlipidemia-atorvastatin, vascepa  GERD-PPI  Vit D deficiency-D2  aspirin 325 mg daily for DVT prophylaxis.   Full code.  Discussed with patient.  Anticipate discharge to SNU facility once precertification clears      Murali Sutherland MD  [facility]  10/01/21  09:01 EDT    I wore protective equipment throughout this patient encounter including a face mask, gloves and protective eyewear.  Hand hygiene was performed before donning protective equipment and after removal when leaving the room.

## 2021-10-02 LAB
GLUCOSE BLDC GLUCOMTR-MCNC: 139 MG/DL (ref 70–130)
GLUCOSE BLDC GLUCOMTR-MCNC: 152 MG/DL (ref 70–130)
GLUCOSE BLDC GLUCOMTR-MCNC: 152 MG/DL (ref 70–130)
GLUCOSE BLDC GLUCOMTR-MCNC: 206 MG/DL (ref 70–130)

## 2021-10-02 PROCEDURE — 97110 THERAPEUTIC EXERCISES: CPT | Performed by: PHYSICAL THERAPIST

## 2021-10-02 PROCEDURE — 63710000001 INSULIN LISPRO (HUMAN) PER 5 UNITS: Performed by: ORTHOPAEDIC SURGERY

## 2021-10-02 PROCEDURE — 97116 GAIT TRAINING THERAPY: CPT | Performed by: PHYSICAL THERAPIST

## 2021-10-02 PROCEDURE — 82962 GLUCOSE BLOOD TEST: CPT

## 2021-10-02 RX ORDER — POLYETHYLENE GLYCOL 3350 17 G/17G
17 POWDER, FOR SOLUTION ORAL 2 TIMES DAILY
Status: DISCONTINUED | OUTPATIENT
Start: 2021-10-02 | End: 2021-10-06

## 2021-10-02 RX ADMIN — DOCUSATE SODIUM 50MG AND SENNOSIDES 8.6MG 2 TABLET: 8.6; 5 TABLET, FILM COATED ORAL at 08:02

## 2021-10-02 RX ADMIN — OXYCODONE AND ACETAMINOPHEN 1 TABLET: 5; 325 TABLET ORAL at 10:09

## 2021-10-02 RX ADMIN — ASPIRIN 325 MG: 325 TABLET ORAL at 08:02

## 2021-10-02 RX ADMIN — POLYETHYLENE GLYCOL 3350 17 G: 17 POWDER, FOR SOLUTION ORAL at 20:13

## 2021-10-02 RX ADMIN — MONTELUKAST SODIUM 10 MG: 10 TABLET, FILM COATED ORAL at 20:13

## 2021-10-02 RX ADMIN — ATORVASTATIN CALCIUM 10 MG: 20 TABLET, FILM COATED ORAL at 08:02

## 2021-10-02 RX ADMIN — LEVOTHYROXINE SODIUM 112 MCG: 0.11 TABLET ORAL at 08:03

## 2021-10-02 RX ADMIN — INSULIN LISPRO 4 UNITS: 100 INJECTION, SOLUTION INTRAVENOUS; SUBCUTANEOUS at 17:51

## 2021-10-02 RX ADMIN — ICOSAPENT ETHYL 2 G: 1000 CAPSULE ORAL at 17:51

## 2021-10-02 RX ADMIN — SODIUM CHLORIDE, PRESERVATIVE FREE 10 ML: 5 INJECTION INTRAVENOUS at 20:14

## 2021-10-02 RX ADMIN — ICOSAPENT ETHYL 2 G: 1000 CAPSULE ORAL at 08:03

## 2021-10-02 RX ADMIN — INSULIN LISPRO 2 UNITS: 100 INJECTION, SOLUTION INTRAVENOUS; SUBCUTANEOUS at 08:01

## 2021-10-02 RX ADMIN — OXYCODONE AND ACETAMINOPHEN 2 TABLET: 5; 325 TABLET ORAL at 20:13

## 2021-10-02 RX ADMIN — SODIUM CHLORIDE, PRESERVATIVE FREE 10 ML: 5 INJECTION INTRAVENOUS at 08:01

## 2021-10-02 RX ADMIN — CETIRIZINE HYDROCHLORIDE 10 MG: 10 TABLET ORAL at 08:03

## 2021-10-02 RX ADMIN — FLUTICASONE PROPIONATE 1 SPRAY: 50 SPRAY, METERED NASAL at 08:04

## 2021-10-02 RX ADMIN — POLYETHYLENE GLYCOL 3350 17 G: 17 POWDER, FOR SOLUTION ORAL at 08:02

## 2021-10-02 RX ADMIN — DOCUSATE SODIUM 50MG AND SENNOSIDES 8.6MG 2 TABLET: 8.6; 5 TABLET, FILM COATED ORAL at 20:13

## 2021-10-02 RX ADMIN — PANTOPRAZOLE SODIUM 40 MG: 40 TABLET, DELAYED RELEASE ORAL at 08:02

## 2021-10-02 NOTE — PLAN OF CARE
Goal Outcome Evaluation:           Progress: improving   Pt is a post op day 6 of a rt tibia ORIF. Dressing is clean dry and intact. Pt continues with the ACE wrap, APPLE and knee immobilizer. Pt compliant with NWB status to RLE. Pt has minimal complaints of pain. Pt is alert and oriented. Pt ambulates to the bathroom with an assist x1. Voiding function intact. Pt is resting at this time, will continue to monitor.

## 2021-10-02 NOTE — PLAN OF CARE
Goal Outcome Evaluation:  Plan of Care Reviewed With: patient        Progress: improving  Outcome Summary: Patient continues to require less assist performing safe bed mobility and transfers. Able to maintain NWB status to R LE while extending gait distance this session as well. Overall, progressing well with all mobility to date. All appropriate PPE donned by patient and therapist; hand hygiene performed.

## 2021-10-02 NOTE — PLAN OF CARE
Goal Outcome Evaluation:  Plan of Care Reviewed With: patient        Progress: improving       Pt 61 y/o female POD 6 of Rt tibial ORIF. Aox4, VSS, on RA. Pt nonweat bearing to right extremity, ambulates with assist standby with walker. Voids spontaneously in bathroom, LBM 9/30/21 on CC diet. Dsg c/d/I, immobilizer on. Plan is for pt to go to rehab, waiting PC. Will continue to monitor.    Lidia Patel BSN, RN

## 2021-10-02 NOTE — THERAPY TREATMENT NOTE
Patient Name: Shanika Velez  : 1960    MRN: [id number]                              Today's Date: 10/2/2021       Admit Date: 2021    Visit Dx:     ICD-10-CM ICD-9-CM   1. Closed fracture of right tibial plateau, initial encounter  S82.141A 823.00   2. Tibial plateau fracture, right, closed, initial encounter  S82.141A 823.00   3. Vitamin D deficiency  E55.9 268.9   4. Acquired hypothyroidism  E03.9 244.9     Patient Active Problem List   Diagnosis   • Dyslipidemia   • Hypothyroidism   • Type 2 diabetes mellitus (HCC)   • Vitamin D deficiency   • Atopic rhinitis   • Asthma   • Osteoarthritis   • GERD (gastroesophageal reflux disease)   • Diverticulosis   • Hyperlipidemia   • Inflammatory bowel disease   • Tibial plateau fracture, right, closed, initial encounter     Past Medical History:   Diagnosis Date   • Dyslipidemia    • Hyperlipidemia    • Hypothyroidism    • Pneumonia    • PONV (postoperative nausea and vomiting)    • Type 2 diabetes mellitus (CMS/HCC)    • Vitamin D deficiency      Past Surgical History:   Procedure Laterality Date   • APPENDECTOMY     • CHOLECYSTECTOMY  UNKNOWN   • TIBIAL PLATEAU OPEN REDUCTION INTERNAL FIXATION Right 2021    Procedure: TIBIAL PLATEAU OPEN REDUCTION INTERNAL FIXATION;  Surgeon: Eitan Tomlinson II, MD;  Location: [facility];  Service: Orthopedics;  Laterality: Right;     General Information     Row Name 10/02/21 1316          Physical Therapy Time and Intention    Document Type  therapy note (daily note)  -CF     Mode of Treatment  physical therapy  -CF     Row Name 10/02/21 1316          General Information    Patient Profile Reviewed  yes  -CF     Prior Level of Function  independent:  -CF     Existing Precautions/Restrictions  non-weight bearing  -CF     Barriers to Rehab  none identified  -CF     Row Name 10/02/21 1316          Living Environment    Lives With  friend(s)  -CF     Row Name 10/02/21 1316          Home Main Entrance    Number  of Stairs, Main Entrance  eight  -CF     Stair Railings, Main Entrance  railings safe and in good condition  -CF     Row Name 10/02/21 1316          Cognition    Orientation Status (Cognition)  oriented x 4  -CF     Row Name 10/02/21 1316          Safety Issues, Functional Mobility    Safety Issues Affecting Function (Mobility)  insight into deficits/self-awareness  -     Impairments Affecting Function (Mobility)  balance;endurance/activity tolerance;strength;pain;range of motion (ROM)  -CF       User Key  (r) = Recorded By, (t) = Taken By, (c) = Cosigned By    Initials Name Provider Type    CF Hussein Sin, PT Physical Therapist        Mobility     Row Name 10/02/21 1318          Bed Mobility    Bed Mobility  bed mobility (all) activities  -     All Activities, [place] (Bed Mobility)  modified independence  -     Row Name 10/02/21 1318          Sit-Stand Transfer    Sit-Stand [place] (Transfers)  standby assist;1 person assist  -     Assistive Device (Sit-Stand Transfers)  walker, standard  -CF     Row Name 10/02/21 1318          Gait/Stairs (Locomotion)    [place] Level (Gait)  standby assist;1 person assist  -CF     Assistive Device (Gait)  walker, standard  -CF     Distance in Feet (Gait)  80 KI in place to R LE  -CF       User Key  (r) = Recorded By, (t) = Taken By, (c) = Cosigned By    Initials Name Provider Type    CF Hussein Sin, PT Physical Therapist        Obj/Interventions     Row Name 10/02/21 1319          Range of Motion Comprehensive    General Range of Motion  lower extremity range of motion deficits identified  -     Row Name 10/02/21 1319          Strength Comprehensive (MMT)    General Manual Muscle Testing (MMT) Assessment  lower extremity strength deficits identified  -     Row Name 10/02/21 1319          Motor Skills    Therapeutic Exercise  knee  -     Row Name 10/02/21 1319          Knee (Therapeutic Exercise)    Knee (Therapeutic Exercise)   strengthening exercise;isometric exercises  -CF     Knee Isometrics (Therapeutic Exercise)  right;gluteal sets;quad sets;10 repetitions  -CF     Knee Strengthening (Therapeutic Exercise)  right;SLR (straight leg raise);10 repetitions  -CF     Row Name 10/02/21 1319          Balance    Static Sitting Balance  WFL  -CF     Dynamic Sitting Balance  WFL  -CF     Static Standing Balance  WFL  -CF     Dynamic Standing Balance  mild impairment  -CF     Row Name 10/02/21 1319          Sensory Assessment (Somatosensory)    Sensory Assessment (Somatosensory)  sensation intact  -CF       User Key  (r) = Recorded By, (t) = Taken By, (c) = Cosigned By    Initials Name Provider Type    CF Hussein Sin, PT Physical Therapist        Goals/Plan     Row Name 10/02/21 1321          Bed Mobility Goal 1 (PT)    Activity/Assistive Device (Bed Mobility Goal 1, PT)  bed mobility activities, all  -CF     [place] Level/Cues Needed (Bed Mobility Goal 1, PT)  modified independence  -CF     Progress/Outcomes (Bed Mobility Goal 1, PT)  goal met  -CF     Row Name 10/02/21 1321          Transfer Goal 1 (PT)    Activity/Assistive Device (Transfer Goal 1, PT)  sit-to-stand/stand-to-sit  -CF     [place] Level/Cues Needed (Transfer Goal 1, PT)  modified independence  -CF     Time Frame (Transfer Goal 1, PT)  short term goal (STG);1 day  -CF     Progress/Outcome (Transfer Goal 1, PT)  continuing progress toward goal  -CF     Row Name 10/02/21 1321          Gait Training Goal 1 (PT)    Activity/Assistive Device (Gait Training Goal 1, PT)  gait (walking locomotion);assistive device use;maintain weight-bearing status  -CF     [place] Level (Gait Training Goal 1, PT)  modified independence  -CF     Distance (Gait Training Goal 1, PT)  150  -CF     Time Frame (Gait Training Goal 1, PT)  short term goal (STG);2 days  -CF     Progress/Outcome (Gait Training Goal 1, PT)  continuing progress toward goal  -CF       User Key  (r) = Recorded  By, (t) = Taken By, (c) = Cosigned By    Initials Name Provider Type    CF Hussein Sin, PT Physical Therapist        Clinical Impression     Row Name 10/02/21 1320          Pain    Additional Documentation  Pain Scale: Numbers Pre/Post-Treatment (Group)  -CF     Row Name 10/02/21 1320          Pain Scale: Numbers Pre/Post-Treatment    Pretreatment Pain Rating  4/10  -CF     Posttreatment Pain Rating  4/10  -CF     Pain Location - Side  Right  -CF     Pain Location  knee  -CF     Pain Intervention(s)  Repositioned  -CF     Row Name 10/02/21 1320          Plan of Care Review    Plan of Care Reviewed With  patient  -CF     Progress  improving  -CF     Outcome Summary  Patient continues to require less assist performing safe bed mobility and transfers. Able to maintain NWB status to R LE while extending gait distance this session as well. Overall, progressing well with all mobility to date. All appropriate PPE donned by patient and therapist; hand hygiene performed.  -CF     Row Name 10/02/21 1320          Therapy Assessment/Plan (PT)    Rehab Potential (PT)  good, to achieve stated therapy goals  -CF     Criteria for Skilled Interventions Met (PT)  yes  -CF     Row Name 10/02/21 1320          Positioning and Restraints    Pre-Treatment Position  in bed  -CF     Post Treatment Position  chair  -CF     In Chair  reclined;call light within reach;encouraged to call for assist  -CF       User Key  (r) = Recorded By, (t) = Taken By, (c) = Cosigned By    Initials Name Provider Type    CF Hussein Sin, PT Physical Therapist        Outcome Measures     Row Name 10/02/21 1324          How much help from another person do you currently need...    Turning from your back to your side while in flat bed without using bedrails?  4  -CF     Moving from lying on back to sitting on the side of a flat bed without bedrails?  4  -CF     Moving to and from a bed to a chair (including a wheelchair)?  3  -CF     Standing up from a  chair using your arms (e.g., wheelchair, bedside chair)?  3  -CF     Climbing 3-5 steps with a railing?  2  -CF     To walk in hospital room?  3  -CF     AM-PAC 6 Clicks Score (PT)  19  -CF     Row Name 10/02/21 1324          Functional Assessment    Outcome Measure Options  AM-PAC 6 Clicks Basic Mobility (PT)  -CF       User Key  (r) = Recorded By, (t) = Taken By, (c) = Cosigned By    Initials Name Provider Type    CF Hussein Sin, PT Physical Therapist                       Physical Therapy Education                 Title: PT OT SLP Therapies (Done)     Topic: Physical Therapy (Done)     Point: Mobility training (Done)     Learning Progress Summary           Patient Acceptance, TB,E, VU by  at 10/2/2021 1325    Acceptance, E,TB,D, VU,NR by  at 10/1/2021 1527    Acceptance, E,TB,D, VU,NR by  at 9/30/2021 1607    Acceptance, E,D, DU by  at 9/29/2021 1200                   Point: Home exercise program (Done)     Learning Progress Summary           Patient Acceptance, TB,E, VU by  at 10/2/2021 1325    Acceptance, E,TB,D, VU,NR by  at 10/1/2021 1527    Acceptance, E,TB,D, VU,NR by  at 9/30/2021 1607    Acceptance, E,D, DU by  at 9/29/2021 1200                   Point: Body mechanics (Done)     Learning Progress Summary           Patient Acceptance, TB,E, VU by  at 10/2/2021 1325    Acceptance, E,TB,D, VU,NR by  at 10/1/2021 1527    Acceptance, E,TB,D, VU,NR by  at 9/30/2021 1607    Acceptance, E,D, DU by PC at 9/29/2021 1200                   Point: Precautions (Done)     Learning Progress Summary           Patient Acceptance, TB,E, VU by  at 10/2/2021 1325    Acceptance, E,TB,D, VU,NR by  at 10/1/2021 1527    Acceptance, E,TB,D, VU,NR by  at 9/30/2021 1607    Acceptance, E,D, DU by PC at 9/29/2021 1200                               User Key     Initials Effective Dates Name Provider Type Discipline    CF 06/16/21 -  Hussein Sin, PT Physical Therapist PT    PC 06/16/21 -   Kellee Phan, PT Physical Therapist PT     03/07/18 -  Janeth Warren, [place] Physical Therapy Assistant PT              PT Recommendation and Plan  Planned Therapy Interventions (PT): balance training, bed mobility training, gait training, home exercise program, patient/family education, stair training, strengthening, transfer training  Plan of Care Reviewed With: patient  Progress: improving  Outcome Summary: Patient continues to require less assist performing safe bed mobility and transfers. Able to maintain NWB status to R LE while extending gait distance this session as well. Overall, progressing well with all mobility to date. All appropriate PPE donned by patient and therapist; hand hygiene performed.     Time Calculation:   PT Charges     Row Name 10/02/21 1325             Time Calculation    Start Time  1035  -CF      Stop Time  1055  -CF      Time Calculation (min)  20 min  -CF      PT - Next Appointment  10/03/21  -CF         Time Calculation- PT    Total Timed Code Minutes- PT  20 minute(s)  -CF        User Key  (r) = Recorded By, (t) = Taken By, (c) = Cosigned By    Initials Name Provider Type    CF Hussein Sin, PT Physical Therapist        Therapy Charges for Today     Code Description Service Date Service Provider Modifiers Qty    82476028459 HC GAIT TRAINING EA 15 MIN 10/2/2021 Hussein Sin, PT GP 1    45125951981 HC PT THER PROC EA 15 MIN 10/2/2021 Hussein Sin, PT GP 1          PT G-Codes  Outcome Measure Options: AM-PAC 6 Clicks Basic Mobility (PT)  AM-PAC 6 Clicks Score (PT): 19    Hussein Sin PT  10/2/2021

## 2021-10-02 NOTE — PROGRESS NOTES
[facility]     LOS: 6 days     Subjective:    CC:Knee Pain and Tailbone Pain    DIET:  Diet Order   Procedures   • Diet Regular; Cardiac, Consistent Carbohydrate     constipated  No soa  No cp    Objective:    Vital Signs:  Temp:  [97.1 °F (36.2 °C)-98 °F (36.7 °C)] 97.1 °F (36.2 °C)  Heart Rate:  [83-98] 98  Resp:  [16-18] 16  BP: (112-145)/(63-75) 124/75    SpO2:  [93 %-95 %] 95 %  on   ;   Device (Oxygen Therapy): room air  Body mass index is 27.76 kg/m².    Physical Exam  Constitutional:       Appearance: Normal appearance.   HENT:      Head: Normocephalic and atraumatic.   Cardiovascular:      Rate and Rhythm: Normal rate and regular rhythm.      Heart sounds: No murmur heard.   No friction rub.   Pulmonary:      Effort: Pulmonary effort is normal.      Breath sounds: Normal breath sounds.   Abdominal:      General: Bowel sounds are normal. There is no distension.      Palpations: Abdomen is soft.      Tenderness: There is no abdominal tenderness.   Skin:     General: Skin is warm and dry.   Neurological:      Mental Status: She is alert.   Psychiatric:         Mood and Affect: Mood normal.         Behavior: Behavior normal.         Results Review:    No results found for: GLUCOSE  Results from last 7 days   Lab Units 09/29/21  0617   WBC 10*3/mm3 10.19   HEMOGLOBIN g/dL 10.8*   HEMATOCRIT % 32.6*   PLATELETS 10*3/mm3 211     Results from last 7 days   Lab Units 09/29/21  0617 09/28/21  0622 09/27/21  0625   SODIUM mmol/L 141   < > 140   POTASSIUM mmol/L 4.4   < > 3.9   CHLORIDE mmol/L 106   < > 105   CO2 mmol/L 23.7   < > 23.2   BUN mg/dL 10   < > 9   CREATININE mg/dL 0.55*   < > 0.59   CALCIUM mg/dL 9.3   < > 9.1   BILIRUBIN mg/dL  --   --  0.4   ALK PHOS U/L  --   --  42   ALT (SGPT) U/L  --   --  13   AST (SGOT) U/L  --   --  18   GLUCOSE mg/dL 154*   < > 146*    < > = values in this interval not displayed.     Results from last 7 days   Lab Units 09/26/21 1949   INR  0.98              Cultures:  No results found for: BLOODCX, URINECX, WOUNDCX, MRSACX, RESPCX, STOOLCX    I have reviewed daily medications and changes in CPOE    Scheduled meds  allopurinol, 200 mg, Oral, Daily  aspirin, 325 mg, Oral, Daily  atorvastatin, 10 mg, Oral, Daily  cetirizine, 10 mg, Oral, Daily  fluticasone, 1 spray, Nasal, Daily  icosapent ethyl, 2 g, Oral, BID With Meals  insulin lispro, 0-9 Units, Subcutaneous, TID AC  levothyroxine, 112 mcg, Oral, Daily  montelukast, 10 mg, Oral, Nightly  pantoprazole, 40 mg, Oral, Daily  polyethylene glycol, 17 g, Oral, Daily  senna-docusate sodium, 2 tablet, Oral, BID  sodium chloride, 10 mL, Intravenous, Q12H  vitamin D, 50,000 Units, Oral, Weekly        lactated ringers, 9 mL/hr, Last Rate: 9 mL/hr (09/28/21 1043)      PRN meds  •  acetaminophen **OR** acetaminophen **OR** acetaminophen  •  bisacodyl  •  calamine  •  dextrose  •  dextrose  •  glucagon (human recombinant)  •  Morphine **AND** naloxone  •  ondansetron  •  oxyCODONE-acetaminophen  •  oxyCODONE-acetaminophen  •  [COMPLETED] Insert peripheral IV **AND** sodium chloride  •  sodium chloride        Tibial plateau fracture, right, closed, initial encounter    Hypothyroidism    Type 2 diabetes mellitus (HCC)    Asthma    GERD (gastroesophageal reflux disease)    Hyperlipidemia    Inflammatory bowel disease        Assessment/Plan:    · Right bicondylar tibial plateau fracture with severe depression of the lateral tibial plateau    ? S/p open reduction and internal fixation of bicondylar right tibial plateau fracture by Dr. Tomlinson on 9/28/21   ? Orthopedic surgery notes that she is high risk for need for future knee replacement.   ? Opiates for pain control.  ? Bowel regimen  ? Aspirin 325 for DVT ppx  ? She is nonweightbearing for 2 months  ? Active range of motion for the knee only  ? Needs to follow up with Dr. Tomlinson in 3 weeks  · Dm2-a1c 6.5. orals held. Glucose has largely been adequately controlled. Continue  ssi.   · Hypothyroidism-synthroid  · Asthma-singular  · Hyperlipidemia-atorvastatin, vascepa  · GERD-PPI  · Vit D deficiency-D2  · aspirin 325 mg daily for DVT prophylaxis.   · Full code.  · Discussed with patient.  · Anticipate discharge to SNU facility once precertification clears        Sammy Mckeon MD  10/02/21  14:51 EDT

## 2021-10-03 ENCOUNTER — APPOINTMENT (OUTPATIENT)
Dept: CARDIOLOGY | Facility: HOSPITAL | Age: 61
End: 2021-10-03

## 2021-10-03 LAB
BH CV LOW VAS LEFT POSTERIOR TIBIAL VESSEL: 1
BH CV LOW VAS LEFT SOLEAL VESSEL: 1
BH CV LOWER VASCULAR LEFT COMMON FEMORAL AUGMENT: NORMAL
BH CV LOWER VASCULAR LEFT COMMON FEMORAL COMPETENT: NORMAL
BH CV LOWER VASCULAR LEFT COMMON FEMORAL COMPRESS: NORMAL
BH CV LOWER VASCULAR LEFT COMMON FEMORAL PHASIC: NORMAL
BH CV LOWER VASCULAR LEFT COMMON FEMORAL SPONT: NORMAL
BH CV LOWER VASCULAR LEFT DISTAL FEMORAL COMPRESS: NORMAL
BH CV LOWER VASCULAR LEFT GASTRONEMIUS COMPRESS: NORMAL
BH CV LOWER VASCULAR LEFT GREATER SAPH AK COMPRESS: NORMAL
BH CV LOWER VASCULAR LEFT GREATER SAPH BK COMPRESS: NORMAL
BH CV LOWER VASCULAR LEFT LESSER SAPH COMPRESS: NORMAL
BH CV LOWER VASCULAR LEFT MID FEMORAL AUGMENT: NORMAL
BH CV LOWER VASCULAR LEFT MID FEMORAL COMPETENT: NORMAL
BH CV LOWER VASCULAR LEFT MID FEMORAL COMPRESS: NORMAL
BH CV LOWER VASCULAR LEFT MID FEMORAL PHASIC: NORMAL
BH CV LOWER VASCULAR LEFT MID FEMORAL SPONT: NORMAL
BH CV LOWER VASCULAR LEFT PERONEAL COMPRESS: NORMAL
BH CV LOWER VASCULAR LEFT POPLITEAL AUGMENT: NORMAL
BH CV LOWER VASCULAR LEFT POPLITEAL COMPETENT: NORMAL
BH CV LOWER VASCULAR LEFT POPLITEAL COMPRESS: NORMAL
BH CV LOWER VASCULAR LEFT POPLITEAL PHASIC: NORMAL
BH CV LOWER VASCULAR LEFT POPLITEAL SPONT: NORMAL
BH CV LOWER VASCULAR LEFT POSTERIOR TIBIAL COMPRESS: NORMAL
BH CV LOWER VASCULAR LEFT POSTERIOR TIBIAL THROMBUS: NORMAL
BH CV LOWER VASCULAR LEFT PROFUNDA FEMORAL COMPRESS: NORMAL
BH CV LOWER VASCULAR LEFT PROXIMAL FEMORAL COMPRESS: NORMAL
BH CV LOWER VASCULAR LEFT SAPHENOFEMORAL JUNCTION COMPRESS: NORMAL
BH CV LOWER VASCULAR LEFT SOLEAL COMPRESS: NORMAL
BH CV LOWER VASCULAR LEFT SOLEAL THROMBUS: NORMAL
BH CV LOWER VASCULAR RIGHT COMMON FEMORAL AUGMENT: NORMAL
BH CV LOWER VASCULAR RIGHT COMMON FEMORAL COMPETENT: NORMAL
BH CV LOWER VASCULAR RIGHT COMMON FEMORAL COMPRESS: NORMAL
BH CV LOWER VASCULAR RIGHT COMMON FEMORAL PHASIC: NORMAL
BH CV LOWER VASCULAR RIGHT COMMON FEMORAL SPONT: NORMAL
BH CV LOWER VASCULAR RIGHT DISTAL FEMORAL COMPRESS: NORMAL
BH CV LOWER VASCULAR RIGHT GASTRONEMIUS COMPRESS: NORMAL
BH CV LOWER VASCULAR RIGHT GREATER SAPH AK COMPRESS: NORMAL
BH CV LOWER VASCULAR RIGHT GREATER SAPH BK COMPRESS: NORMAL
BH CV LOWER VASCULAR RIGHT LESSER SAPH COMPRESS: NORMAL
BH CV LOWER VASCULAR RIGHT MID FEMORAL AUGMENT: NORMAL
BH CV LOWER VASCULAR RIGHT MID FEMORAL COMPETENT: NORMAL
BH CV LOWER VASCULAR RIGHT MID FEMORAL COMPRESS: NORMAL
BH CV LOWER VASCULAR RIGHT MID FEMORAL PHASIC: NORMAL
BH CV LOWER VASCULAR RIGHT MID FEMORAL SPONT: NORMAL
BH CV LOWER VASCULAR RIGHT PERONEAL COMPRESS: NORMAL
BH CV LOWER VASCULAR RIGHT POPLITEAL AUGMENT: NORMAL
BH CV LOWER VASCULAR RIGHT POPLITEAL COMPETENT: NORMAL
BH CV LOWER VASCULAR RIGHT POPLITEAL COMPRESS: NORMAL
BH CV LOWER VASCULAR RIGHT POPLITEAL PHASIC: NORMAL
BH CV LOWER VASCULAR RIGHT POPLITEAL SPONT: NORMAL
BH CV LOWER VASCULAR RIGHT POSTERIOR TIBIAL COMPRESS: NORMAL
BH CV LOWER VASCULAR RIGHT PROFUNDA FEMORAL COMPRESS: NORMAL
BH CV LOWER VASCULAR RIGHT PROXIMAL FEMORAL COMPRESS: NORMAL
BH CV LOWER VASCULAR RIGHT SAPHENOFEMORAL JUNCTION COMPRESS: NORMAL
D DIMER PPP FEU-MCNC: 2.37 MCGFEU/ML (ref 0–0.49)
GLUCOSE BLDC GLUCOMTR-MCNC: 123 MG/DL (ref 70–130)
GLUCOSE BLDC GLUCOMTR-MCNC: 135 MG/DL (ref 70–130)
GLUCOSE BLDC GLUCOMTR-MCNC: 158 MG/DL (ref 70–130)
MAXIMAL PREDICTED HEART RATE: 159 BPM
STRESS TARGET HR: 135 BPM

## 2021-10-03 PROCEDURE — 85379 FIBRIN DEGRADATION QUANT: CPT | Performed by: HOSPITALIST

## 2021-10-03 PROCEDURE — 93970 EXTREMITY STUDY: CPT

## 2021-10-03 PROCEDURE — 97530 THERAPEUTIC ACTIVITIES: CPT

## 2021-10-03 PROCEDURE — 82962 GLUCOSE BLOOD TEST: CPT

## 2021-10-03 RX ADMIN — FLUTICASONE PROPIONATE 1 SPRAY: 50 SPRAY, METERED NASAL at 10:10

## 2021-10-03 RX ADMIN — POLYETHYLENE GLYCOL 3350 17 G: 17 POWDER, FOR SOLUTION ORAL at 20:49

## 2021-10-03 RX ADMIN — SODIUM CHLORIDE, PRESERVATIVE FREE 10 ML: 5 INJECTION INTRAVENOUS at 20:50

## 2021-10-03 RX ADMIN — PANTOPRAZOLE SODIUM 40 MG: 40 TABLET, DELAYED RELEASE ORAL at 10:09

## 2021-10-03 RX ADMIN — SODIUM CHLORIDE, PRESERVATIVE FREE 10 ML: 5 INJECTION INTRAVENOUS at 10:09

## 2021-10-03 RX ADMIN — OXYCODONE AND ACETAMINOPHEN 1 TABLET: 5; 325 TABLET ORAL at 23:46

## 2021-10-03 RX ADMIN — APIXABAN 10 MG: 5 TABLET, FILM COATED ORAL at 20:49

## 2021-10-03 RX ADMIN — OXYCODONE AND ACETAMINOPHEN 1 TABLET: 5; 325 TABLET ORAL at 16:04

## 2021-10-03 RX ADMIN — DOCUSATE SODIUM 50MG AND SENNOSIDES 8.6MG 2 TABLET: 8.6; 5 TABLET, FILM COATED ORAL at 20:49

## 2021-10-03 RX ADMIN — ASPIRIN 325 MG: 325 TABLET ORAL at 10:08

## 2021-10-03 RX ADMIN — CETIRIZINE HYDROCHLORIDE 10 MG: 10 TABLET ORAL at 10:08

## 2021-10-03 RX ADMIN — DOCUSATE SODIUM 50MG AND SENNOSIDES 8.6MG 2 TABLET: 8.6; 5 TABLET, FILM COATED ORAL at 10:08

## 2021-10-03 RX ADMIN — ATORVASTATIN CALCIUM 10 MG: 20 TABLET, FILM COATED ORAL at 10:08

## 2021-10-03 RX ADMIN — MONTELUKAST SODIUM 10 MG: 10 TABLET, FILM COATED ORAL at 20:49

## 2021-10-03 RX ADMIN — ICOSAPENT ETHYL 2 G: 1000 CAPSULE ORAL at 10:08

## 2021-10-03 RX ADMIN — ICOSAPENT ETHYL 2 G: 1000 CAPSULE ORAL at 17:55

## 2021-10-03 RX ADMIN — LEVOTHYROXINE SODIUM 112 MCG: 0.11 TABLET ORAL at 10:08

## 2021-10-03 RX ADMIN — POLYETHYLENE GLYCOL 3350 17 G: 17 POWDER, FOR SOLUTION ORAL at 10:09

## 2021-10-03 RX ADMIN — OXYCODONE AND ACETAMINOPHEN 2 TABLET: 5; 325 TABLET ORAL at 05:10

## 2021-10-03 NOTE — PLAN OF CARE
Goal Outcome Evaluation:  Plan of Care Reviewed With: patient        Progress: improving  Outcome Summary: Patient demonstrates improved functional mobility by performing transfers with less cues required. She ambulated 200' with standard walker while maintaining NWB RLE and limited secondary to L hip pain. Patient returned to supine position to complete LE exercises while in KI. She remains a candidate for skilled PT services.

## 2021-10-03 NOTE — PROGRESS NOTES
Vascular lab preliminary    Bilateral lower extremity venous duplex exam is complete    Right  leg is negative for DVT/SVT    Left leg is positive for acute calf DVT      Spoke with the patients nurse Brittni at 2:00pm    Final report to follow

## 2021-10-03 NOTE — PROGRESS NOTES
[facility]     LOS: 7 days     Subjective:    CC:Knee Pain and Tailbone Pain    DIET:  Diet Order   Procedures   • Diet Regular; Cardiac, Consistent Carbohydrate   pain behind the right knee  bm x1 today  No soa  No cp    Objective:    Vital Signs:  Temp:  [96.8 °F (36 °C)-97.8 °F (36.6 °C)] 97.1 °F (36.2 °C)  Heart Rate:  [73-98] 92  Resp:  [16] 16  BP: (104-127)/(59-75) 104/59    SpO2:  [94 %-96 %] 96 %  on   ;   Device (Oxygen Therapy): room air  Body mass index is 27.76 kg/m².    Physical Exam  Constitutional:       Appearance: Normal appearance.   HENT:      Head: Normocephalic and atraumatic.   Cardiovascular:      Rate and Rhythm: Normal rate and regular rhythm.      Heart sounds: No murmur heard.   No friction rub.   Pulmonary:      Effort: Pulmonary effort is normal.      Breath sounds: Normal breath sounds.   Abdominal:      General: Bowel sounds are normal. There is no distension.      Palpations: Abdomen is soft.      Tenderness: There is no abdominal tenderness.   Musculoskeletal:      Comments: Right knee immobilizer   Skin:     General: Skin is warm and dry.   Neurological:      Mental Status: She is alert.   Psychiatric:         Mood and Affect: Mood normal.         Behavior: Behavior normal.         Results Review:    No results found for: GLUCOSE  Results from last 7 days   Lab Units 09/29/21  0617   WBC 10*3/mm3 10.19   HEMOGLOBIN g/dL 10.8*   HEMATOCRIT % 32.6*   PLATELETS 10*3/mm3 211     Results from last 7 days   Lab Units 09/29/21  0617 09/28/21  0622 09/27/21  0625   SODIUM mmol/L 141   < > 140   POTASSIUM mmol/L 4.4   < > 3.9   CHLORIDE mmol/L 106   < > 105   CO2 mmol/L 23.7   < > 23.2   BUN mg/dL 10   < > 9   CREATININE mg/dL 0.55*   < > 0.59   CALCIUM mg/dL 9.3   < > 9.1   BILIRUBIN mg/dL  --   --  0.4   ALK PHOS U/L  --   --  42   ALT (SGPT) U/L  --   --  13   AST (SGOT) U/L  --   --  18   GLUCOSE mg/dL 154*   < > 146*    < > = values in this interval not  displayed.     Results from last 7 days   Lab Units 09/26/21 1949   INR  0.98             Cultures:  No results found for: BLOODCX, URINECX, WOUNDCX, MRSACX, RESPCX, STOOLCX    I have reviewed daily medications and changes in CPOE    Scheduled meds  aspirin, 325 mg, Oral, Daily  atorvastatin, 10 mg, Oral, Daily  cetirizine, 10 mg, Oral, Daily  fluticasone, 1 spray, Nasal, Daily  icosapent ethyl, 2 g, Oral, BID With Meals  insulin lispro, 0-9 Units, Subcutaneous, TID AC  levothyroxine, 112 mcg, Oral, Daily  montelukast, 10 mg, Oral, Nightly  pantoprazole, 40 mg, Oral, Daily  polyethylene glycol, 17 g, Oral, BID  senna-docusate sodium, 2 tablet, Oral, BID  sodium chloride, 10 mL, Intravenous, Q12H  vitamin D, 50,000 Units, Oral, Weekly        lactated ringers, 9 mL/hr, Last Rate: 9 mL/hr (09/28/21 1043)      PRN meds  •  acetaminophen **OR** acetaminophen **OR** acetaminophen  •  bisacodyl  •  calamine  •  dextrose  •  dextrose  •  glucagon (human recombinant)  •  Morphine **AND** naloxone  •  ondansetron  •  oxyCODONE-acetaminophen  •  [COMPLETED] Insert peripheral IV **AND** sodium chloride  •  sodium chloride        Tibial plateau fracture, right, closed, initial encounter    Hypothyroidism    Type 2 diabetes mellitus (HCC)    Asthma    GERD (gastroesophageal reflux disease)    Hyperlipidemia    Inflammatory bowel disease        Assessment/Plan:    · Right bicondylar tibial plateau fracture with severe depression of the lateral tibial plateau    ? S/p open reduction and internal fixation of bicondylar right tibial plateau fracture by Dr. Tomlinson on 9/28/21   ? Orthopedic surgery notes that she is high risk for need for future knee replacement.   ? Opiates for pain control.  ? Bowel regimen  ? Aspirin 325 for DVT ppx  ? She is nonweightbearing for 2 months  ? Active range of motion for the knee only  ? Needs to follow up with Dr. Tomlinson in 3 weeks  ? Pain behind the knee, check u/s  · Dm2-a1c 6.5. orals held. Glucose  has largely been adequately controlled. Continue ssi.   · Hypothyroidism-synthroid  · Asthma-singular  · Hyperlipidemia-atorvastatin, vascepa  · GERD-PPI  · Vit D deficiency-D2  · aspirin 325 mg daily for DVT prophylaxis.   · Full code.  · Discussed with patient.  · Anticipate discharge to SNU facility once precertification clears        Sammy Mckeon MD  10/03/21  10:51 EDT

## 2021-10-03 NOTE — PLAN OF CARE
Goal Outcome Evaluation:  Plan of Care Reviewed With: patient        Progress: improving  Outcome Summary: good pain control with PO, ambulating assist of 1 with walker, KI in place, voiding per BRP, BM 10/2, awaiting precert for SNF, NWB RLE, educated on glucose meds and monitoring

## 2021-10-03 NOTE — THERAPY TREATMENT NOTE
Patient Name: Shanika Velez  : 1960    MRN: [id number]                              Today's Date: 10/3/2021       Admit Date: 2021    Visit Dx:     ICD-10-CM ICD-9-CM   1. Closed fracture of right tibial plateau, initial encounter  S82.141A 823.00   2. Tibial plateau fracture, right, closed, initial encounter  S82.141A 823.00   3. Vitamin D deficiency  E55.9 268.9   4. Acquired hypothyroidism  E03.9 244.9     Patient Active Problem List   Diagnosis   • Dyslipidemia   • Hypothyroidism   • Type 2 diabetes mellitus (HCC)   • Vitamin D deficiency   • Atopic rhinitis   • Asthma   • Osteoarthritis   • GERD (gastroesophageal reflux disease)   • Diverticulosis   • Hyperlipidemia   • Inflammatory bowel disease   • Tibial plateau fracture, right, closed, initial encounter     Past Medical History:   Diagnosis Date   • Dyslipidemia    • Hyperlipidemia    • Hypothyroidism    • Pneumonia    • PONV (postoperative nausea and vomiting)    • Type 2 diabetes mellitus (HCC)    • Vitamin D deficiency      Past Surgical History:   Procedure Laterality Date   • APPENDECTOMY     • CHOLECYSTECTOMY  UNKNOWN   • TIBIAL PLATEAU OPEN REDUCTION INTERNAL FIXATION Right 2021    Procedure: TIBIAL PLATEAU OPEN REDUCTION INTERNAL FIXATION;  Surgeon: Eitan Tomlinson II, MD;  Location: [facility];  Service: Orthopedics;  Laterality: Right;     General Information     Row Name 10/03/21 1250          Physical Therapy Time and Intention    Document Type  therapy note (daily note)  -CHRISTOFER     Mode of Treatment  physical therapy  -CHRISTOFER     Row Name 10/03/21 1250          General Information    Patient Profile Reviewed  yes  -CHRISTOFER       User Key  (r) = Recorded By, (t) = Taken By, (c) = Cosigned By    Initials Name Provider Type    Kristy Mak PT Physical Therapist        Mobility     Row Name 10/03/21 1250          Bed Mobility    Bed Mobility  sit-supine  -CHRISTOFER     Sit-Supine [place] (Bed Mobility)  modified  independence  -     Assistive Device (Bed Mobility)  bed rails;head of bed elevated  -     Row Name 10/03/21 1250          Transfers    Comment (Transfers)  Patient in restroom upon arrival  -     Row Name 10/03/21 1250          Sit-Stand Transfer    Sit-Stand [place] (Transfers)  standby assist;1 person assist;modified independence  -     Assistive Device (Sit-Stand Transfers)  walker, standard  -     Row Name 10/03/21 1250          Gait/Stairs (Locomotion)    [place] Level (Gait)  standby assist;1 person assist  -CHRISTOFER     Assistive Device (Gait)  walker, standard  -     Distance in Feet (Gait)  200' KI in place to R LE  -CHRISTOFER     Deviations/Abnormal Patterns (Gait)  gretchen decreased;stride length decreased  -CHRISTOFER     Bilateral Gait Deviations  forward flexed posture  -     Right Sided Gait Deviations  weight shift ability decreased NWB  -CHRISTOFER     [place] Level (Stairs)  unable to assess  -CHRISTOFER     Comment (Gait/Stairs)  Distance ambulated limited secondary to L hip pain  -       User Key  (r) = Recorded By, (t) = Taken By, (c) = Cosigned By    Initials Name Provider Type    Kristy Mak, PT Physical Therapist        Obj/Interventions     Row Name 10/03/21 1253          Motor Skills    Therapeutic Exercise  hip  -     Row Name 10/03/21 1253          Hip (Therapeutic Exercise)    Hip (Therapeutic Exercise)  strengthening exercise  -     Hip Strengthening (Therapeutic Exercise)  right;aBduction;aDduction;10 repetitions  -     Row Name 10/03/21 1253          Knee (Therapeutic Exercise)    Knee Isometrics (Therapeutic Exercise)  gluteal sets;quad sets;10 repetitions;5 second hold;bilateral  -     Knee Strengthening (Therapeutic Exercise)  right;SLR (straight leg raise);10 repetitions  -     Row Name 10/03/21 1253          Balance    Balance Assessment  sitting static balance;sitting dynamic balance;standing static balance;standing dynamic balance  -     Static Sitting  Balance  WFL  -CHRISTOFER     Dynamic Sitting Balance  WFL  -CHRISTOFER     Static Standing Balance  WFL  -CHRISTOFER     Dynamic Standing Balance  mild impairment  -       User Key  (r) = Recorded By, (t) = Taken By, (c) = Cosigned By    Initials Name Provider Type    Kristy Mak, PT Physical Therapist        Goals/Plan    No documentation.       Clinical Impression     Row Name 10/03/21 1255          Pain    Additional Documentation  Pain Scale: Numbers Pre/Post-Treatment (Group)  -[facility] Name 10/03/21 1255          Pain Scale: Numbers Pre/Post-Treatment    Pretreatment Pain Rating  4/10  -     Pain Location - Side  Right  -     Pain Location  knee  -     Pain Intervention(s)  Repositioned;Ambulation/increased activity;Elevated  -     Row Name 10/03/21 1255          Plan of Care Review    Plan of Care Reviewed With  patient  -     Progress  improving  -     Outcome Summary  Patient demonstrates improved functional mobility by performing transfers with less cues required. She ambulated 200' with standard walker while maintaining NWB RLE and limited secondary to L hip pain. Patient returned to supine position to complete LE exercises while in KI. She remains a candidate for skilled PT services.  -     Row Name 10/03/21 1258          Therapy Assessment/Plan (PT)    Criteria for Skilled Interventions Met (PT)  yes  -[facility] Name 10/03/21 1259          Positioning and Restraints    Pre-Treatment Position  bathroom  -     Post Treatment Position  bed  -     In Bed  supine;call light within reach;encouraged to call for assist;exit alarm on;RLE elevated;with brace  -       User Key  (r) = Recorded By, (t) = Taken By, (c) = Cosigned By    Initials Name Provider Type    Kristy Mak, PT Physical Therapist        Outcome Measures     Row Name 10/03/21 1251          How much help from another person do you currently need...    Turning from your back to your side while in flat bed without  using bedrails?  4  -CHRISTOFER     Moving from lying on back to sitting on the side of a flat bed without bedrails?  4  -CHRISTOFER     Moving to and from a bed to a chair (including a wheelchair)?  3  -CHRISTOFER     Standing up from a chair using your arms (e.g., wheelchair, bedside chair)?  3  -CHRISTOFER     Climbing 3-5 steps with a railing?  2  -CHRISTOFER     To walk in hospital room?  3  -CHRISTOFER     AM-PAC 6 Clicks Score (PT)  19  -CHRISTOFER     Row Name 10/03/21 1258          Functional Assessment    Outcome Measure Options  AM-PAC 6 Clicks Basic Mobility (PT)  -       User Key  (r) = Recorded By, (t) = Taken By, (c) = Cosigned By    Initials Name Provider Type    Kristy Mak, PT Physical Therapist                       Physical Therapy Education                 Title: PT OT SLP Therapies (Done)     Topic: Physical Therapy (Done)     Point: Mobility training (Done)     Learning Progress Summary           Patient Acceptance, E,TB, VU by  at 10/3/2021 1258    Acceptance, TB,E, VU by  at 10/2/2021 1325    Acceptance, E,TB,D, VU,NR by  at 10/1/2021 1527    Acceptance, E,TB,D, VU,NR by  at 9/30/2021 1607    Acceptance, E,D, DU by  at 9/29/2021 1200                   Point: Home exercise program (Done)     Learning Progress Summary           Patient Acceptance, E,TB, VU by  at 10/3/2021 1258    Acceptance, TB,E, VU by  at 10/2/2021 1325    Acceptance, E,TB,D, VU,NR by  at 10/1/2021 1527    Acceptance, E,TB,D, VU,NR by  at 9/30/2021 1607    Acceptance, E,D, DU by PC at 9/29/2021 1200                   Point: Body mechanics (Done)     Learning Progress Summary           Patient Acceptance, TB,E, VU by  at 10/2/2021 1325    Acceptance, E,TB,D, VU,NR by  at 10/1/2021 1527    Acceptance, E,TB,D, VU,NR by  at 9/30/2021 1607    Acceptance, E,D, DU by PC at 9/29/2021 1200                   Point: Precautions (Done)     Learning Progress Summary           Patient Acceptance, E,TB, VU by CHRISTOFER at 10/3/2021 1258    Acceptance, TB,E, VU  by  at 10/2/2021 1325    Acceptance, E,TB,D, VU,NR by  at 10/1/2021 1527    Acceptance, E,TB,D, VU,NR by  at 9/30/2021 1607    Acceptance, E,D, DU by  at 9/29/2021 1200                               User Key     Initials Effective Dates Name Provider Type Discipline     06/16/21 -  Hussein Sin, PT Physical Therapist PT     06/16/21 -  Kellee Phan, PT Physical Therapist PT     03/07/18 -  Janeth Warren, PTA Physical Therapy Assistant PT     05/19/21 -  Kristy Mcmillan, PT Physical Therapist PT              PT Recommendation and Plan     Plan of Care Reviewed With: patient  Progress: improving  Outcome Summary: Patient demonstrates improved functional mobility by performing transfers with less cues required. She ambulated 200' with standard walker while maintaining NWB RLE and limited secondary to L hip pain. Patient returned to supine position to complete LE exercises while in KI. She remains a candidate for skilled PT services.     Time Calculation:   PT Charges     Row Name 10/03/21 1258             Time Calculation    Start Time  0937  -      Stop Time  0950  -      Time Calculation (min)  13 min  -      PT Received On  10/03/21  -      PT - Next Appointment  10/04/21  -      PT Goal Re-Cert Due Date  10/06/21  -        User Key  (r) = Recorded By, (t) = Taken By, (c) = Cosigned By    Initials Name Provider Type    Kristy Mak, PT Physical Therapist        Therapy Charges for Today     Code Description Service Date Service Provider Modifiers Qty    [id number]  PT THERAPEUTIC ACT EA 15 MIN 10/3/2021 Kristy Mcmillan, PT GP 1          PT G-Codes  Outcome Measure Options: AM-PAC 6 Clicks Basic Mobility (PT)  AM-PAC 6 Clicks Score (PT): 19    Kristy Mcmillan PT  10/3/2021

## 2021-10-04 LAB
ANION GAP SERPL CALCULATED.3IONS-SCNC: 12.9 MMOL/L (ref 5–15)
BASOPHILS # BLD AUTO: 0.05 10*3/MM3 (ref 0–0.2)
BASOPHILS NFR BLD AUTO: 0.6 % (ref 0–1.5)
BUN SERPL-MCNC: 11 MG/DL (ref 8–23)
BUN/CREAT SERPL: 19.3 (ref 7–25)
CALCIUM SPEC-SCNC: 9.4 MG/DL (ref 8.6–10.5)
CHLORIDE SERPL-SCNC: 102 MMOL/L (ref 98–107)
CO2 SERPL-SCNC: 24.1 MMOL/L (ref 22–29)
CREAT SERPL-MCNC: 0.57 MG/DL (ref 0.57–1)
DEPRECATED RDW RBC AUTO: 46.1 FL (ref 37–54)
EOSINOPHIL # BLD AUTO: 0.31 10*3/MM3 (ref 0–0.4)
EOSINOPHIL NFR BLD AUTO: 3.5 % (ref 0.3–6.2)
ERYTHROCYTE [DISTWIDTH] IN BLOOD BY AUTOMATED COUNT: 14 % (ref 12.3–15.4)
GFR SERPL CREATININE-BSD FRML MDRD: 108 ML/MIN/1.73
GLUCOSE BLDC GLUCOMTR-MCNC: 119 MG/DL (ref 70–130)
GLUCOSE BLDC GLUCOMTR-MCNC: 143 MG/DL (ref 70–130)
GLUCOSE BLDC GLUCOMTR-MCNC: 155 MG/DL (ref 70–130)
GLUCOSE BLDC GLUCOMTR-MCNC: 174 MG/DL (ref 70–130)
GLUCOSE BLDC GLUCOMTR-MCNC: 182 MG/DL (ref 70–130)
GLUCOSE SERPL-MCNC: 121 MG/DL (ref 65–99)
HCT VFR BLD AUTO: 34.7 % (ref 34–46.6)
HGB BLD-MCNC: 11.2 G/DL (ref 12–15.9)
IMM GRANULOCYTES # BLD AUTO: 0.06 10*3/MM3 (ref 0–0.05)
IMM GRANULOCYTES NFR BLD AUTO: 0.7 % (ref 0–0.5)
LYMPHOCYTES # BLD AUTO: 3.07 10*3/MM3 (ref 0.7–3.1)
LYMPHOCYTES NFR BLD AUTO: 34.6 % (ref 19.6–45.3)
MCH RBC QN AUTO: 28.9 PG (ref 26.6–33)
MCHC RBC AUTO-ENTMCNC: 32.3 G/DL (ref 31.5–35.7)
MCV RBC AUTO: 89.4 FL (ref 79–97)
MONOCYTES # BLD AUTO: 0.84 10*3/MM3 (ref 0.1–0.9)
MONOCYTES NFR BLD AUTO: 9.5 % (ref 5–12)
NEUTROPHILS NFR BLD AUTO: 4.55 10*3/MM3 (ref 1.7–7)
NEUTROPHILS NFR BLD AUTO: 51.1 % (ref 42.7–76)
NRBC BLD AUTO-RTO: 0 /100 WBC (ref 0–0.2)
PLATELET # BLD AUTO: 304 10*3/MM3 (ref 140–450)
PMV BLD AUTO: 10.2 FL (ref 6–12)
POTASSIUM SERPL-SCNC: 3.9 MMOL/L (ref 3.5–5.2)
RBC # BLD AUTO: 3.88 10*6/MM3 (ref 3.77–5.28)
SODIUM SERPL-SCNC: 139 MMOL/L (ref 136–145)
WBC # BLD AUTO: 8.88 10*3/MM3 (ref 3.4–10.8)

## 2021-10-04 PROCEDURE — 80048 BASIC METABOLIC PNL TOTAL CA: CPT | Performed by: HOSPITALIST

## 2021-10-04 PROCEDURE — 97110 THERAPEUTIC EXERCISES: CPT

## 2021-10-04 PROCEDURE — 82962 GLUCOSE BLOOD TEST: CPT

## 2021-10-04 PROCEDURE — 63710000001 INSULIN LISPRO (HUMAN) PER 5 UNITS: Performed by: ORTHOPAEDIC SURGERY

## 2021-10-04 PROCEDURE — 94799 UNLISTED PULMONARY SVC/PX: CPT

## 2021-10-04 PROCEDURE — 85025 COMPLETE CBC W/AUTO DIFF WBC: CPT | Performed by: HOSPITALIST

## 2021-10-04 RX ADMIN — PANTOPRAZOLE SODIUM 40 MG: 40 TABLET, DELAYED RELEASE ORAL at 08:50

## 2021-10-04 RX ADMIN — SODIUM CHLORIDE, PRESERVATIVE FREE 10 ML: 5 INJECTION INTRAVENOUS at 08:52

## 2021-10-04 RX ADMIN — APIXABAN 10 MG: 5 TABLET, FILM COATED ORAL at 20:37

## 2021-10-04 RX ADMIN — ICOSAPENT ETHYL 2 G: 1000 CAPSULE ORAL at 17:35

## 2021-10-04 RX ADMIN — DOCUSATE SODIUM 50MG AND SENNOSIDES 8.6MG 2 TABLET: 8.6; 5 TABLET, FILM COATED ORAL at 08:50

## 2021-10-04 RX ADMIN — OXYCODONE AND ACETAMINOPHEN 1 TABLET: 5; 325 TABLET ORAL at 20:38

## 2021-10-04 RX ADMIN — CETIRIZINE HYDROCHLORIDE 10 MG: 10 TABLET ORAL at 08:51

## 2021-10-04 RX ADMIN — INSULIN LISPRO 2 UNITS: 100 INJECTION, SOLUTION INTRAVENOUS; SUBCUTANEOUS at 06:45

## 2021-10-04 RX ADMIN — FLUTICASONE PROPIONATE 1 SPRAY: 50 SPRAY, METERED NASAL at 08:52

## 2021-10-04 RX ADMIN — ICOSAPENT ETHYL 2 G: 1000 CAPSULE ORAL at 08:52

## 2021-10-04 RX ADMIN — BISACODYL 10 MG: 10 SUPPOSITORY RECTAL at 01:35

## 2021-10-04 RX ADMIN — POLYETHYLENE GLYCOL 3350 17 G: 17 POWDER, FOR SOLUTION ORAL at 08:50

## 2021-10-04 RX ADMIN — MONTELUKAST SODIUM 10 MG: 10 TABLET, FILM COATED ORAL at 20:38

## 2021-10-04 RX ADMIN — OXYCODONE AND ACETAMINOPHEN 1 TABLET: 5; 325 TABLET ORAL at 06:14

## 2021-10-04 RX ADMIN — ATORVASTATIN CALCIUM 10 MG: 20 TABLET, FILM COATED ORAL at 08:50

## 2021-10-04 RX ADMIN — SODIUM CHLORIDE, PRESERVATIVE FREE 10 ML: 5 INJECTION INTRAVENOUS at 20:39

## 2021-10-04 RX ADMIN — LEVOTHYROXINE SODIUM 112 MCG: 0.11 TABLET ORAL at 08:50

## 2021-10-04 RX ADMIN — APIXABAN 10 MG: 5 TABLET, FILM COATED ORAL at 08:50

## 2021-10-04 NOTE — PROGRESS NOTES
"DAILY PROGRESS NOTE  [facility]    Patient Identification:  Name: Shanika Velez  Age: 60 y.o.  Sex: female  :  1960  MRN: [id number]         Primary Care Physician: Jake Kidd MD      Subjective  Patient with no acute complaints this morning.    Objective:  General Appearance:  Comfortable, well-appearing, in no acute distress and not in pain.    Vital signs: (most recent): Blood pressure 103/65, pulse 75, temperature 96.9 °F (36.1 °C), temperature source Skin, resp. rate 18, height 175.3 cm (69\"), weight 85.3 kg (188 lb), SpO2 92 %.    Lungs:  Normal effort and normal respiratory rate.  Breath sounds clear to auscultation.    Heart: Normal rate.  Regular rhythm.    Extremities: There is no dependent edema.  (Right knee braced.)  Neurological: Patient is alert and oriented to person, place and time.    Skin:  Warm and dry.                Vital signs in last 24 hours:  Temp:  [96.9 °F (36.1 °C)-98.7 °F (37.1 °C)] 96.9 °F (36.1 °C)  Heart Rate:  [75-99] 75  Resp:  [16-18] 18  BP: (103-130)/(65-81) 103/65    Intake/Output:    Intake/Output Summary (Last 24 hours) at 10/4/2021 1014  Last data filed at 10/4/2021 0800  Gross per 24 hour   Intake 1590 ml   Output --   Net 1590 ml         Results from last 7 days   Lab Units 21  0617 21  0622   WBC 10*3/mm3 10.19 7.65   HEMOGLOBIN g/dL 10.8* 10.9*   PLATELETS 10*3/mm3 211 188     Results from last 7 days   Lab Units 21  0617 21  0622   SODIUM mmol/L 141 142   POTASSIUM mmol/L 4.4 4.1   CHLORIDE mmol/L 106 106   CO2 mmol/L 23.7 24.8   BUN mg/dL 10 10   CREATININE mg/dL 0.55* 0.66   GLUCOSE mg/dL 154* 130*   Estimated Creatinine Clearance: 125.1 mL/min (A) (by C-G formula based on SCr of 0.55 mg/dL (L)).  Results from last 7 days   Lab Units 21  0617 21  0622   CALCIUM mg/dL 9.3 9.0         Assessment:  · Right bicondylar tibial plateau fracture with severe depression of the lateral tibial " plateau    ? S/p open reduction and internal fixation of bicondylar right tibial plateau fracture by Dr. Tomlinson on 9/28/21   ? Orthopedic surgery notes that she is high risk for need for future knee replacement.   ? She is nonweightbearing for 2 months  ? Active range of motion for the knee only  ? Needs to follow up with Dr. Tomlinson in 3 weeks  · Dm2-a1c 6.5. orals held. Glucose has largely been adequately controlled. Continue ssi.   · DVT: Patient now on Eliquis.  ASA discontinued.  · Hypothyroidism-synthroid  · Asthma-singular  · Hyperlipidemia-atorvastatin, vascepa  · GERD-PPI  · Vit D deficiency-D2.   · Full code.  · Discussed with patient.  · Anticipate discharge to SNU facility once precertification clears     All problems new to this examiner.    Plan:  Please see above.  Check CBC and BMP.  Discharge planning.  Discussed with CCP.    Oj Bartlett MD  10/4/2021  10:14 EDT

## 2021-10-04 NOTE — PROGRESS NOTES
Continued Stay Note  [facility]     Patient Name: Shanika Velez  MRN: [id number]  Today's Date: 10/4/2021    Admit Date: 9/26/2021    Discharge Plan     Row Name 10/04/21 1725       Plan    Plan  [facility] pending [employer] Pre-cert    Plan Comments  Comfirmed  auto insurance auth # as [id number] through  [facility] [employer].  Provide number to Kevon with [employer] who bulmaro lbe in contac with auto insurance to attempt to precert.        Discharge Codes    No documentation.             EDUARDO Prado

## 2021-10-04 NOTE — PLAN OF CARE
Goal Outcome Evaluation:  Plan of Care Reviewed With: patient        Progress: improving  Outcome Summary: Pt tolerated treatment well this date. Ambulated 90ft w/ walker and CGA-SBA. Pt is continuing to maintain NWB on R LE w/ KI on w/out concern. Pt is also very compliant w/ bed exercises, and instructed pt to perform them on L LE as well. Awaiting insurance approval for SNF.

## 2021-10-04 NOTE — PAYOR COMM NOTE
"CONTINUED STAY REVIEW  REF #[id number]  F:  [phone]        Shanika Velez (60 y.o. Female)     Date of Birth Social Security Number Address Home Phone MRN    1960  [address] [place] [facility] [phone] [id number]    Judaism Marital Status          None Single       Admission Date Admission Type Admitting Provider Attending Provider Department, Room/Bed    9/26/21 Emergency Jess Zaidi MD Broadd[place], Emmett J., MD [facility] [address] [place], P891/1    Discharge Date Discharge Disposition Discharge Destination                       Attending Provider: Oj Bartlett MD    Allergies: Codeine, Fluoxetine Hcl, Penicillins, Tetracycline, Tetracyclines & Related    Isolation: None   Infection: COVID (History) (09/27/21)   Code Status: CPR    Ht: 175.3 cm (69\")   Wt: 85.3 kg (188 lb)    Admission Cmt: None   Principal Problem: Tibial plateau fracture, right, closed, initial encounter [S82.141A]                 Active Insurance as of 9/26/2021     Primary Coverage     Payor Plan Insurance Group Employer/Plan Group    [facility] [employer]      Payor Plan Address Payor Plan Phone Number Payor Plan Fax Number Effective Dates    [address] [address] [phone]  9/26/2021 - None Entered    [facility] [address]       Subscriber Name Subscriber Birth Date Member ID       SHANIKA VELEZ 1960            Secondary Coverage     Payor Plan Insurance Group Employer/Plan Group    [employer] PPO [id number]     Payor Plan Address Payor Plan Phone Number Payor Plan Fax Number Effective Dates    [address] [phone]  1/1/2018 - None Entered    [facility] [id number]       Subscriber Name Subscriber Birth Date Member ID       SHANIKA VELEZ 1960 [id number]                 Emergency Contacts      (Rel.) Home Phone Work Phone Mobile Phone    Layla Ochoa (Sister) [phone] -- --            Vital Signs (last day)     Date/Time  "  Temp   Temp src   Pulse   Resp   BP   Patient Position   SpO2    10/04/21 1100   96.9 (36.1)   Skin   85   17   121/67   Lying   94    10/04/21 0700   96.9 (36.1)   Skin   75   18   103/65   Lying   92    10/04/21 0332   98.7 (37.1)   Oral   89   18   117/75   Lying   91    10/03/21 2330   98.2 (36.8)   Oral   99   18   122/81   Lying   91    10/03/21 1927   98.7 (37.1)   Oral   89   18   117/68   Lying   93    10/03/21 1500   96.9 (36.1)   Skin   90   16   122/73   Lying   95    10/03/21 1100   97.1 (36.2)   Skin   86   16   130/73   Lying   94    10/03/21 0700   --   --   92   16   104/59   Lying   96    10/03/21 0300   97.1 (36.2)   Skin   73   16   120/67   Lying   95              Oxygen Therapy (last day)     Date/Time   SpO2   Device (Oxygen Therapy)   Flow (L/min)   Oxygen Concentration (%)   ETCO2 (mmHg)    10/04/21 1100   94   room air   --   --   --    10/04/21 0700   92   room air   --   --   --    10/04/21 0332   91   room air   --   --   --    10/03/21 2330   91   room air   --   --   --    10/03/21 2015   --   room air   --   --   --    10/03/21 1927   93   room air   --   --   --    10/03/21 1500   95   room air   --   --   --    10/03/21 1100   94   room air   --   --   --    10/03/21 0700   96   room air   --   --   --    10/03/21 0300   95   room air   --   --   --              Lines, Drains & Airways    Active LDAs     Name:   Placement date:   Placement time:   Site:   Days:    Peripheral IV 09/28/21 1315 Left Hand   09/28/21    1315    Hand   5                Medication Administration Report for Shanika Velez as of 10/04/21 8624    Legend:    Given Hold Not Given Due Canceled Entry Other Actions    Time Time (Time) Time  Time-Action       Discontinued     Completed     Future     MAR Hold     Linked           Medications 10/03/21 10/04/21    apixaban (ELIQUIS) tablet 10 mg  Dose: 10 mg  Freq: Every 12 Hours Scheduled Route: PO  Indications of Use: DVT/PE (active thrombosis)  Start: 10/03/21 2100    End: 10/10/21 2059    Admin Instructions:   Tablet may be crushed and suspended in 60 mL of water or D5W and immediately delivered via NG tube.     2049 0850     2100             Followed by  apixaban (ELIQUIS) tablet 5 mg  Dose: 5 mg  Freq: Every 12 Hours Scheduled Route: PO  Indications of Use: DVT/PE (active thrombosis)  Start: 10/10/21 2100    Admin Instructions:   Tablet may be crushed and suspended in 60 mL of water or D5W and immediately delivered via NG tube.          atorvastatin (LIPITOR) tablet 10 mg  Dose: 10 mg  Freq: Daily Route: PO  Start: 09/27/21 0945    Admin Instructions:   Avoid grapefruit juice.     1008            0850               cetirizine (zyrTEC) tablet 10 mg  Dose: 10 mg  Freq: Daily Route: PO  Start: 09/27/21 0900    1008            0851               fluticasone (FLONASE) 50 MCG/ACT nasal spray 1 spray  Dose: 1 spray  Freq: Daily Route: NA  Start: 09/27/21 0900    1010            0852               icosapent ethyl (VASCEPA) capsule 2 g  Dose: 2 g  Freq: 2 Times Daily With Meals Route: PO  Start: 09/29/21 0800    Admin Instructions:   Swallow capsule whole.  Do not break open, crush, dissolve, or chew capsule.     1001     1756           0852     1800              insulin lispro (ADMELOG) injection 0-9 Units  Dose: 0-9 Units  Freq: 3 Times Daily Before Meals Route: SC  Start: 09/27/21 0730    Admin Instructions:   Correction - Moderate Dose.  40-60 units/day total insulin dose or average weight, on oral agents    Blood glucose 150-199 mg/dL - 2 units  Blood glucose 200-249 mg/dL - 4 units  Blood glucose 250-299 mg/dL - 6 units  Blood glucose 300-349 mg/dL - 7 units  Blood glucose 350-400 mg/dL - 8 units  Blood glucose greater than 400 mg/dL - 9 units and call provider   Caution: Look alike/sound alike drug alert     (0247) (3342) (5809) [phone]             levothyroxine (SYNTHROID, LEVOTHROID) tablet 112 mcg  Dose: 112 mcg  Freq: Daily Route:  PO  Start: 09/27/21 0900    Admin Instructions:   Take on empty stomach.     1008            0850               montelukast (SINGULAIR) tablet 10 mg  Dose: 10 mg  Freq: Nightly Route: PO  Start: 09/26/21 2145 2049 2100               pantoprazole (PROTONIX) EC tablet 40 mg  Dose: 40 mg  Freq: Daily Route: PO  Start: 09/27/21 0900    Admin Instructions:   Swallow whole; do not crush, split, or chew.     1009            0850               polyethylene glycol (MIRALAX) packet 17 g  Dose: 17 g  Freq: 2 Times Daily Route: PO  Start: 10/02/21 2100    Admin Instructions:   Use 4-8 ounces of water, tea, or juice for each 17 gram dose.     1009 2049 0850     2100              sennosides-docusate (PERICOLACE) 8.6-50 MG per tablet 2 tablet  Dose: 2 tablet  Freq: 2 Times Daily Route: PO  Start: 09/30/21 2100 1008 2049 0850     2100              sodium chloride 0.9 % flush 10 mL  Dose: 10 mL  Freq: Every 12 Hours Scheduled Route: IV  Start: 09/26/21 2145 1009 2050           0852     2100              vitamin D (ERGOCALCIFEROL) capsule 50,000 Units  Dose: 50,000 Units  Freq: Weekly Route: PO  Start: 09/30/21 0900        Completed Medications  Medications 10/03/21 10/04/21       ceFAZolin in dextrose (ANCEF) IVPB solution 2 g  Dose: 2 g  Freq: Once Route: IV  Indications of Use: PERIOPERATIVE PHARMACOPROPHYLAXIS  Start: 09/28/21 1037   End: 09/28/21 1105    Admin Instructions:   Caution: Look alike/sound alike drug alert          ceFAZolin in dextrose (ANCEF) IVPB solution 2 g  Dose: 2 g  Freq: Every 8 Hours Route: IV  Indications of Use: PERIOPERATIVE PHARMACOPROPHYLAXIS  Start: 09/28/21 1900   End: 09/29/21 1215    Admin Instructions:   Caution: Look alike/sound alike drug alert          ceFAZolin in dextrose (ANCEF) IVPB solution 2 g  Dose: 2 g  Freq: On Call to O.R. Route: IV  Indications of Use: PERIOPERATIVE PHARMACOPROPHYLAXIS  Start: 09/27/21 0600   End: 09/28/21 1101     Admin Instructions:   Caution: Look alike/sound alike drug alert          famotidine (PEPCID) injection 20 mg  Dose: 20 mg  Freq: Once Route: IV  Start: 09/28/21 1024   End: 09/28/21 1032    Admin Instructions:   Dilute to 10 mL total volume and give IV push over 2 minutes.          HYDROcodone-acetaminophen (NORCO) 5-325 MG per tablet 1 tablet  Dose: 1 tablet  Freq: Once Route: PO  Start: 09/26/21 1835   End: 09/26/21 1942    Admin Instructions:   [AUREA]    Do not exceed 4 grams of acetaminophen in a 24 hr period. Max dose of 2gm for AST/ALT greater than 120 units/L        If given for pain, use the following pain scale:   Mild Pain = Pain Score of 1-3, CPOT 1-2  Moderate Pain = Pain Score of 4-6, CPOT 3-4  Severe Pain = Pain Score of 7-10, CPOT 5-8         Discontinued Medications  Medications 10/03/21 10/04/21       allopurinol (ZYLOPRIM) tablet 200 mg  Dose: 200 mg  Freq: Daily Route: PO  Start: 09/27/21 0900   End: 10/02/21 1452    Admin Instructions:   (BKC) Take with food if GI upset occurs.          apixaban (ELIQUIS) tablet 10 mg  Dose: 10 mg  Freq: Every 12 Hours Scheduled Route: PO  Indications of Use: DVT/PE (active thrombosis)  Start: 10/03/21 2100   End: 10/03/21 1625    Admin Instructions:   Tablet may be crushed and suspended in 60 mL of water or D5W and immediately delivered via NG tube.         Followed by  apixaban (ELIQUIS) tablet 5 mg  Dose: 5 mg  Freq: Every 12 Hours Scheduled Route: PO  Indications of Use: DVT/PE (active thrombosis)  Start: 10/10/21 2100   End: 10/03/21 1625    Admin Instructions:   Tablet may be crushed and suspended in 60 mL of water or D5W and immediately delivered via NG tube.          aspirin tablet 325 mg  Dose: 325 mg  Freq: Daily Route: PO  Start: 09/29/21 0900   End: 10/03/21 1625    Admin Instructions:   Do not exceed 4 grams of aspirin in a 24 hr period.    If given for pain, use the following pain scale:   Mild Pain = Pain Score of 1-3, CPOT 1-2  Moderate Pain =  Pain Score of 4-6, CPOT 3-4  Severe Pain = Pain Score of 7-10, CPOT 5-8     1008                icosapent ethyl (VASCEPA) capsule 2 g  Dose: 2 g  Freq: 2 Times Daily With Meals Route: PO  Start: 21   End: 21 175    Admin Instructions:   Swallow capsule whole.  Do not break open, crush, dissolve, or chew capsule.          levothyroxine (SYNTHROID, LEVOTHROID) tablet 112 mcg  Dose: 112 mcg  Freq: Every Early Morning Route: PO  Start: 21 0600   End: 21    Admin Instructions:   Take on empty stomach.          polyethylene glycol (MIRALAX) packet 17 g  Dose: 17 g  Freq: Daily Route: PO  Start: 21 1030   End: 10/02/21 1452    Admin Instructions:   Use 4-8 ounces of water, tea, or juice for each 17 gram dose.          sennosides-docusate (PERICOLACE) 8.6-50 MG per tablet 1 tablet  Dose: 1 tablet  Freq: 2 Times Daily Route: PO  Start: 21 1000   End: 21 0944         sodium chloride 0.9 % flush 3 mL  Dose: 3 mL  Freq: Every 12 Hours Scheduled Route: IV  Start: 21 1024   End: 21 1221              ,   Medication Administration Report for Shanika Velez as of 10/04/21 1244    Legend:    Given Hold Not Given Due Canceled Entry Other Actions    Time Time (Time) Time  Time-Action       Discontinued     Completed     Future     MAR Hold     Linked           Medications 10/03/21 10/04/21    lactated ringers infusion  Rate: 9 mL/hr Dose: 9 mL/hr  Freq: Continuous Route: IV  Start: 21 1024        Discontinued Medications  Medications 10/03/21 10/04/21       sodium chloride 0.9 % infusion  Rate: 75 mL/hr Dose: 75 mL/hr  Freq: Continuous Route: IV  Start: 21   End: 21 1037                     Operative/Procedure Notes       Eitan Tomlinson II, MD at 21 1104  Version 1 of 1         ORIF Tibial Plateau Operative Note  Dr. DAMION Van” Davi GILLIS  [phone]    PATIENT NAME: Shanika Velez  MRN: [id number]  : 1960 AGE: 60 y.o. GENDER:  female  DATE OF OPERATION: 9/28/2021  PREOPERATIVE DIAGNOSIS: Bicondylar tibial Plateau Fracture   POSTOPERATIVE DIAGNOSIS: Same  OPERATION PERFORMED: Open Reduction and Internal Fixation of bicondylar right Tibial Plateau Fracture  SURGEON: Eitan Tomlinson MD  Circulator: Radha Lee RN  Radiology Technologist: Janeth Wellington  Scrub Person: Michelle Osborn MARLEN; Xiomara Kim; Jasmeet Lennon  Vendor Representative: Kalpana Gomez; Marcelo Montanez  Assistant: Gris Galeas PA  ANESTHESIA: General  ASSISTANT: Gris Galeas. This case would not have been possible without another set of skilled surgical hands for retraction, use of instrumentation, and general assistance.  This assistance was vital to the success of the case.   ESTIMATED BLOOD LOSS: Minimal  SPONGE AND NEEDLE COUNT: Correct  INDICATIONS: This patient was found to have a bicondylar tibial plateau fracture. A discussion of operative versus nonoperative treatment was had with the patient. They elected to undergo open reduction and internal fixation of the fracture. The risks of surgery were discussed and included the risk of anesthesia, infection, damage to neurovascular structures, implant loosening/failure, hardware prominence, nonunion/malunion, subsidence, continued pain, arthritis, the need for further procedures, medical complications, and others. No guarantees were made. The patient wished to proceed with surgery and a surgical consent was signed.  COMPONENTS:   · [employer] Veri-Lock VLP Proximal Tibial Locking Plate   · Bone Graft: Calcium Phosphate    PERTINENT FINDINGS: Bicondylar tibial Plateau Fracture    DETAILS OF PROCEDURE:  The patient was met in the preoperative area. The site was marked. The consent and H&P were reviewed. The patient was then wheeled back to the operative suite and transferred to the operative table. The patient underwent anesthesia and a tourniquet was placed on the upper thigh. Excess hair along the incision was  "removed with clippers. Surgical alcohol was used to thoroughly clean the operative area.    The leg was then prepped and draped in the normal sterile fashion, which included ChloraPrep and multiple layers of sterile drapes. After a surgical timeout in which administration of preoperative antibiotics and the surgical site were confirmed, the tourniquet was put up.    A lateral approach to the proximal tibia was utilized.  Dissection was carried down to the bone and soft tissue was elevated in order to allow access to the proximal tibial metaphysis.    A bony reduction was performed, and the joint line was elevated using a bone tamp. A plate was then chosen of adequate length. Its position was confirmed on fluoroscopy.  The plate was provisionally held in place by K wires. Rafting screws were placed underneath of the joint surface, to hold the articular reduction. Fluoroscopy was used during this step to ensure that no screws penetrated into the knee joint. The plate was then filled distally and proximally as needed with locking and non-locking screws. Fluoroscopy was used during placement of screws to ensure adequate position and length.    Due to the amount of depression, there is the reasonable sized bone void and I used some calcium phosphate bone cement to fill this.    The wound was then thoroughly irrigated. The soft tissues about the knee were injected with a local anesthetic coctail. The deep fascia was closed with 0 Vicryl, to cover the plate.  The skin was closed in layers.  A sterile surgical dressing was placed. The patient was awoken from anesthesia, moved to the [facility] and taken to the recovery room in stable condition. Sponge and needle count were correct. There were no complications. Patient tolerated the procedure well.    R \"Brennon\" Davi GILLIS MD  Orthopaedic Surgery  [facility]  [phone]                  Electronically signed by Eitan Tomlinson II, MD at 09/28/21 1232   " "       Physician Progress Notes      Oj Bartlett MD at 10/04/21 1014          DAILY PROGRESS NOTE  [facility]    Patient Identification:  Name: Shanika Velez  Age: 60 y.o.  Sex: female  :  1960  MRN: [id number]         Primary Care Physician: Jake Kidd MD      Subjective  Patient with no acute complaints this morning.    Objective:  General Appearance:  Comfortable, well-appearing, in no acute distress and not in pain.    Vital signs: (most recent): Blood pressure 103/65, pulse 75, temperature 96.9 °F (36.1 °C), temperature source Skin, resp. rate 18, height 175.3 cm (69\"), weight 85.3 kg (188 lb), SpO2 92 %.    Lungs:  Normal effort and normal respiratory rate.  Breath sounds clear to auscultation.    Heart: Normal rate.  Regular rhythm.    Extremities: There is no dependent edema.  (Right knee braced.)  Neurological: Patient is alert and oriented to person, place and time.    Skin:  Warm and dry.                Vital signs in last 24 hours:  Temp:  [96.9 °F (36.1 °C)-98.7 °F (37.1 °C)] 96.9 °F (36.1 °C)  Heart Rate:  [75-99] 75  Resp:  [16-18] 18  BP: (103-130)/(65-81) 103/65    Intake/Output:    Intake/Output Summary (Last 24 hours) at 10/4/2021 1014  Last data filed at 10/4/2021 0800  Gross per 24 hour   Intake 1590 ml   Output --   Net 1590 ml         Results from last 7 days   Lab Units 21  0617 21  0622   WBC 10*3/mm3 10.19 7.65   HEMOGLOBIN g/dL 10.8* 10.9*   PLATELETS 10*3/mm3 211 188     Results from last 7 days   Lab Units 21  0617 21  0622   SODIUM mmol/L 141 142   POTASSIUM mmol/L 4.4 4.1   CHLORIDE mmol/L 106 106   CO2 mmol/L 23.7 24.8   BUN mg/dL 10 10   CREATININE mg/dL 0.55* 0.66   GLUCOSE mg/dL 154* 130*   Estimated Creatinine Clearance: 125.1 mL/min (A) (by C-G formula based on SCr of 0.55 mg/dL (L)).  Results from last 7 days   Lab Units 21  0617 21  0622   CALCIUM mg/dL 9.3 9.0         Assessment:  · Right " bicondylar tibial plateau fracture with severe depression of the lateral tibial plateau    ? S/p open reduction and internal fixation of bicondylar right tibial plateau fracture by Dr. Tomlinson on 9/28/21   ? Orthopedic surgery notes that she is high risk for need for future knee replacement.   ? She is nonweightbearing for 2 months  ? Active range of motion for the knee only  ? Needs to follow up with Dr. Tomlinson in 3 weeks  · Dm2-a1c 6.5. orals held. Glucose has largely been adequately controlled. Continue ssi.   · DVT: Patient now on Eliquis.  ASA discontinued.  · Hypothyroidism-synthroid  · Asthma-singular  · Hyperlipidemia-atorvastatin, vascepa  · GERD-PPI  · Vit D deficiency-D2.   · Full code.  · Discussed with patient.  · Anticipate discharge to SNU facility once precertification clears     All problems new to this examiner.    Plan:  Please see above.  Check CBC and BMP.  Discharge planning.  Discussed with CCP.    Oj Bartlett MD  10/4/2021  10:14 EDT      Electronically signed by Oj Bartlett MD at 10/04/21 1017     Sammy Mckeon MD at 10/03/21 1051              [facility]     LOS: 7 days     Subjective:    CC:Knee Pain and Tailbone Pain    DIET:  Diet Order   Procedures   • Diet Regular; Cardiac, Consistent Carbohydrate   pain behind the right knee  bm x1 today  No soa  No cp    Objective:    Vital Signs:  Temp:  [96.8 °F (36 °C)-97.8 °F (36.6 °C)] 97.1 °F (36.2 °C)  Heart Rate:  [73-98] 92  Resp:  [16] 16  BP: (104-127)/(59-75) 104/59    SpO2:  [94 %-96 %] 96 %  on   ;   Device (Oxygen Therapy): room air  Body mass index is 27.76 kg/m².    Physical Exam  Constitutional:       Appearance: Normal appearance.   HENT:      Head: Normocephalic and atraumatic.   Cardiovascular:      Rate and Rhythm: Normal rate and regular rhythm.      Heart sounds: No murmur heard.   No friction rub.   Pulmonary:      Effort: Pulmonary effort is normal.      Breath sounds: Normal  breath sounds.   Abdominal:      General: Bowel sounds are normal. There is no distension.      Palpations: Abdomen is soft.      Tenderness: There is no abdominal tenderness.   Musculoskeletal:      Comments: Right knee immobilizer   Skin:     General: Skin is warm and dry.   Neurological:      Mental Status: She is alert.   Psychiatric:         Mood and Affect: Mood normal.         Behavior: Behavior normal.         Results Review:    No results found for: GLUCOSE  Results from last 7 days   Lab Units 09/29/21  0617   WBC 10*3/mm3 10.19   HEMOGLOBIN g/dL 10.8*   HEMATOCRIT % 32.6*   PLATELETS 10*3/mm3 211     Results from last 7 days   Lab Units 09/29/21  0617 09/28/21  0622 09/27/21  0625   SODIUM mmol/L 141   < > 140   POTASSIUM mmol/L 4.4   < > 3.9   CHLORIDE mmol/L 106   < > 105   CO2 mmol/L 23.7   < > 23.2   BUN mg/dL 10   < > 9   CREATININE mg/dL 0.55*   < > 0.59   CALCIUM mg/dL 9.3   < > 9.1   BILIRUBIN mg/dL  --   --  0.4   ALK PHOS U/L  --   --  42   ALT (SGPT) U/L  --   --  13   AST (SGOT) U/L  --   --  18   GLUCOSE mg/dL 154*   < > 146*    < > = values in this interval not displayed.     Results from last 7 days   Lab Units 09/26/21  1949   INR  0.98             Cultures:  No results found for: BLOODCX, URINECX, WOUNDCX, MRSACX, RESPCX, STOOLCX    I have reviewed daily medications and changes in CPOE    Scheduled meds  aspirin, 325 mg, Oral, Daily  atorvastatin, 10 mg, Oral, Daily  cetirizine, 10 mg, Oral, Daily  fluticasone, 1 spray, Nasal, Daily  icosapent ethyl, 2 g, Oral, BID With Meals  insulin lispro, 0-9 Units, Subcutaneous, TID AC  levothyroxine, 112 mcg, Oral, Daily  montelukast, 10 mg, Oral, Nightly  pantoprazole, 40 mg, Oral, Daily  polyethylene glycol, 17 g, Oral, BID  senna-docusate sodium, 2 tablet, Oral, BID  sodium chloride, 10 mL, Intravenous, Q12H  vitamin D, 50,000 Units, Oral, Weekly        lactated ringers, 9 mL/hr, Last Rate: 9 mL/hr (09/28/21 1043)      PRN meds  •  acetaminophen  **OR** acetaminophen **OR** acetaminophen  •  bisacodyl  •  calamine  •  dextrose  •  dextrose  •  glucagon (human recombinant)  •  Morphine **AND** naloxone  •  ondansetron  •  oxyCODONE-acetaminophen  •  [COMPLETED] Insert peripheral IV **AND** sodium chloride  •  sodium chloride        Tibial plateau fracture, right, closed, initial encounter    Hypothyroidism    Type 2 diabetes mellitus (HCC)    Asthma    GERD (gastroesophageal reflux disease)    Hyperlipidemia    Inflammatory bowel disease        Assessment/Plan:    · Right bicondylar tibial plateau fracture with severe depression of the lateral tibial plateau    ? S/p open reduction and internal fixation of bicondylar right tibial plateau fracture by Dr. Tomlinson on 9/28/21   ? Orthopedic surgery notes that she is high risk for need for future knee replacement.   ? Opiates for pain control.  ? Bowel regimen  ? Aspirin 325 for DVT ppx  ? She is nonweightbearing for 2 months  ? Active range of motion for the knee only  ? Needs to follow up with Dr. Tomlinson in 3 weeks  ? Pain behind the knee, check u/s  · Dm2-a1c 6.5. orals held. Glucose has largely been adequately controlled. Continue ssi.   · Hypothyroidism-synthroid  · Asthma-singular  · Hyperlipidemia-atorvastatin, vascepa  · GERD-PPI  · Vit D deficiency-D2  · aspirin 325 mg daily for DVT prophylaxis.   · Full code.  · Discussed with patient.  · Anticipate discharge to SNU facility once precertification clears        Sammy Mckeon MD  10/03/21  10:51 EDT        Electronically signed by Sammy Mckeon MD at 10/03/21 1053     Sammy Mckeon MD at 10/02/21 4858              [facility]     LOS: 6 days     Subjective:    CC:Knee Pain and Tailbone Pain    DIET:  Diet Order   Procedures   • Diet Regular; Cardiac, Consistent Carbohydrate     constipated  No soa  No cp    Objective:    Vital Signs:  Temp:  [97.1 °F (36.2 °C)-98 °F (36.7 °C)] 97.1 °F (36.2 °C)  Heart Rate:  [83-98]  98  Resp:  [16-18] 16  BP: (112-145)/(63-75) 124/75    SpO2:  [93 %-95 %] 95 %  on   ;   Device (Oxygen Therapy): room air  Body mass index is 27.76 kg/m².    Physical Exam  Constitutional:       Appearance: Normal appearance.   HENT:      Head: Normocephalic and atraumatic.   Cardiovascular:      Rate and Rhythm: Normal rate and regular rhythm.      Heart sounds: No murmur heard.   No friction rub.   Pulmonary:      Effort: Pulmonary effort is normal.      Breath sounds: Normal breath sounds.   Abdominal:      General: Bowel sounds are normal. There is no distension.      Palpations: Abdomen is soft.      Tenderness: There is no abdominal tenderness.   Skin:     General: Skin is warm and dry.   Neurological:      Mental Status: She is alert.   Psychiatric:         Mood and Affect: Mood normal.         Behavior: Behavior normal.         Results Review:    No results found for: GLUCOSE  Results from last 7 days   Lab Units 09/29/21  0617   WBC 10*3/mm3 10.19   HEMOGLOBIN g/dL 10.8*   HEMATOCRIT % 32.6*   PLATELETS 10*3/mm3 211     Results from last 7 days   Lab Units 09/29/21  0617 09/28/21  0622 09/27/21  0625   SODIUM mmol/L 141   < > 140   POTASSIUM mmol/L 4.4   < > 3.9   CHLORIDE mmol/L 106   < > 105   CO2 mmol/L 23.7   < > 23.2   BUN mg/dL 10   < > 9   CREATININE mg/dL 0.55*   < > 0.59   CALCIUM mg/dL 9.3   < > 9.1   BILIRUBIN mg/dL  --   --  0.4   ALK PHOS U/L  --   --  42   ALT (SGPT) U/L  --   --  13   AST (SGOT) U/L  --   --  18   GLUCOSE mg/dL 154*   < > 146*    < > = values in this interval not displayed.     Results from last 7 days   Lab Units 09/26/21  1949   INR  0.98             Cultures:  No results found for: BLOODCX, URINECX, WOUNDCX, MRSACX, RESPCX, STOOLCX    I have reviewed daily medications and changes in CPOE    Scheduled meds  allopurinol, 200 mg, Oral, Daily  aspirin, 325 mg, Oral, Daily  atorvastatin, 10 mg, Oral, Daily  cetirizine, 10 mg, Oral, Daily  fluticasone, 1 spray, Nasal,  Daily  icosapent ethyl, 2 g, Oral, BID With Meals  insulin lispro, 0-9 Units, Subcutaneous, TID AC  levothyroxine, 112 mcg, Oral, Daily  montelukast, 10 mg, Oral, Nightly  pantoprazole, 40 mg, Oral, Daily  polyethylene glycol, 17 g, Oral, Daily  senna-docusate sodium, 2 tablet, Oral, BID  sodium chloride, 10 mL, Intravenous, Q12H  vitamin D, 50,000 Units, Oral, Weekly        lactated ringers, 9 mL/hr, Last Rate: 9 mL/hr (09/28/21 1043)      PRN meds  •  acetaminophen **OR** acetaminophen **OR** acetaminophen  •  bisacodyl  •  calamine  •  dextrose  •  dextrose  •  glucagon (human recombinant)  •  Morphine **AND** naloxone  •  ondansetron  •  oxyCODONE-acetaminophen  •  oxyCODONE-acetaminophen  •  [COMPLETED] Insert peripheral IV **AND** sodium chloride  •  sodium chloride        Tibial plateau fracture, right, closed, initial encounter    Hypothyroidism    Type 2 diabetes mellitus (HCC)    Asthma    GERD (gastroesophageal reflux disease)    Hyperlipidemia    Inflammatory bowel disease        Assessment/Plan:    · Right bicondylar tibial plateau fracture with severe depression of the lateral tibial plateau    ? S/p open reduction and internal fixation of bicondylar right tibial plateau fracture by Dr. Tomlinson on 9/28/21   ? Orthopedic surgery notes that she is high risk for need for future knee replacement.   ? Opiates for pain control.  ? Bowel regimen  ? Aspirin 325 for DVT ppx  ? She is nonweightbearing for 2 months  ? Active range of motion for the knee only  ? Needs to follow up with Dr. Tomlinson in 3 weeks  · Dm2-a1c 6.5. orals held. Glucose has largely been adequately controlled. Continue ssi.   · Hypothyroidism-synthroid  · Asthma-singular  · Hyperlipidemia-atorvastatin, vascepa  · GERD-PPI  · Vit D deficiency-D2  · aspirin 325 mg daily for DVT prophylaxis.   · Full code.  · Discussed with patient.  · Anticipate discharge to SNU facility once precertification clears        Sammy Mckeon MD  10/02/21  14:51  EDT        Electronically signed by Sammy Mckeon MD at 10/02/21 1615

## 2021-10-04 NOTE — THERAPY TREATMENT NOTE
Patient Name: Shanika Velez  : 1960    MRN: [id number]                              Today's Date: 10/4/2021       Admit Date: 2021    Visit Dx:     ICD-10-CM ICD-9-CM   1. Closed fracture of right tibial plateau, initial encounter  S82.141A 823.00   2. Tibial plateau fracture, right, closed, initial encounter  S82.141A 823.00   3. Vitamin D deficiency  E55.9 268.9   4. Acquired hypothyroidism  E03.9 244.9     Patient Active Problem List   Diagnosis   • Dyslipidemia   • Hypothyroidism   • Type 2 diabetes mellitus (HCC)   • Vitamin D deficiency   • Atopic rhinitis   • Asthma   • Osteoarthritis   • GERD (gastroesophageal reflux disease)   • Diverticulosis   • Hyperlipidemia   • Inflammatory bowel disease   • Tibial plateau fracture, right, closed, initial encounter     Past Medical History:   Diagnosis Date   • Dyslipidemia    • Hyperlipidemia    • Hypothyroidism    • Pneumonia    • PONV (postoperative nausea and vomiting)    • Type 2 diabetes mellitus (HCC)    • Vitamin D deficiency      Past Surgical History:   Procedure Laterality Date   • APPENDECTOMY     • CHOLECYSTECTOMY  UNKNOWN   • TIBIAL PLATEAU OPEN REDUCTION INTERNAL FIXATION Right 2021    Procedure: TIBIAL PLATEAU OPEN REDUCTION INTERNAL FIXATION;  Surgeon: Eitan Tomlinson II, MD;  Location: [facility];  Service: Orthopedics;  Laterality: Right;     General Information     Row Name 10/04/21 1114          Physical Therapy Time and Intention    Document Type  therapy note (daily note)  -     Mode of Treatment  physical therapy  -     Row Name 10/04/21 1114          General Information    Existing Precautions/Restrictions  fall;non-weight bearing KI on R LE  -     Row Name 10/04/21 1114          Cognition    Orientation Status (Cognition)  oriented x 4  -       User Key  (r) = Recorded By, (t) = Taken By, (c) = Cosigned By    Initials Name Provider Type     Janeth Warren, PTA Physical Therapy Assistant         Mobility     [facility] Name 10/04/21 1114          Bed Mobility    Bed Mobility  supine-sit;sit-supine  -SM     Supine-Sit Davison (Bed Mobility)  modified independence  -SM     Sit-Supine [place]ison (Bed Mobility)  modified independence  -     Assistive Device (Bed Mobility)  bed rails;head of bed elevated  -[facility] Name 10/04/21 1114          Sit-Stand Transfer    Sit-Stand Davison (Transfers)  supervision  -     Assistive Device (Sit-Stand Transfers)  walker, standard  -SM     Row Name 10/04/21 1114          Gait/Stairs (Locomotion)    Davison Level (Gait)  contact guard;standby assist  -     Assistive Device (Gait)  walker, standard  -     Distance in Feet (Gait)  90  -SM     Deviations/Abnormal Patterns (Gait)  gretchen decreased;stride length decreased  -     Bilateral Gait Deviations  forward flexed posture  -     Right Sided Gait Deviations  weight shift ability decreased NWB  -       User Key  (r) = Recorded By, (t) = Taken By, (c) = Cosigned By    Initials Name Provider Type    Janeth Norman PTA Physical Therapy Assistant        Obj/Interventions     Row Name 10/04/21 1116          Motor Skills    Therapeutic Exercise  -- supine B AP, QS, GS, hip abd/add, SLR x20 reps  -       User Key  (r) = Recorded By, (t) = Taken By, (c) = Cosigned By    Initials Name Provider Type    Janeth Norman PTA Physical Therapy Assistant        Goals/Plan    No documentation.       Clinical Impression     [facility] Name 10/04/21 1117          Pain    Additional Documentation  Pain Scale: Numbers Pre/Post-Treatment (Group)  -SM     Row Name 10/04/21 1117          Pain Scale: Numbers Pre/Post-Treatment    Pretreatment Pain Rating  4/10  -     Posttreatment Pain Rating  4/10  -     Pain Location - Side  Right  -     Pain Location  knee  -     Pain Intervention(s)  Repositioned;Ambulation/increased activity;Rest  -[facility] Name 10/04/21 1117          Positioning and  Restraints    Pre-Treatment Position  in bed  -SM     Post Treatment Position  bed  -SM     In Bed  supine;call light within reach;encouraged to call for assist;exit alarm on  -       User Key  (r) = Recorded By, (t) = Taken By, (c) = Cosigned By    Initials Name Provider Type    Janeth Norman PTA Physical Therapy Assistant        Outcome Measures     Row Name 10/04/21 1118          How much help from another person do you currently need...    Turning from your back to your side while in flat bed without using bedrails?  4  -SM     Moving from lying on back to sitting on the side of a flat bed without bedrails?  3  -SM     Moving to and from a bed to a chair (including a wheelchair)?  3  -SM     Standing up from a chair using your arms (e.g., wheelchair, bedside chair)?  4  -SM     Climbing 3-5 steps with a railing?  3  -SM     To walk in hospital room?  3  -SM     AM-PAC 6 Clicks Score (PT)  20  -     Row Name 10/04/21 1118          Functional Assessment    Outcome Measure Options  AM-PAC 6 Clicks Basic Mobility (PT)  -       User Key  (r) = Recorded By, (t) = Taken By, (c) = Cosigned By    Initials Name Provider Type    Janeth Norman PTA Physical Therapy Assistant                       Physical Therapy Education                 Title: PT OT SLP Therapies (Done)     Topic: Physical Therapy (Done)     Point: Mobility training (Done)     Learning Progress Summary           Patient Acceptance, E,TB,D, VU by  at 10/4/2021 1119    Acceptance, E,TB, VU by CHRISTOFER at 10/3/2021 1258    Acceptance, TB,E, VU by  at 10/2/2021 1325    Acceptance, E,TB,D, VU,NR by  at 10/1/2021 1527    Acceptance, E,TB,D, VU,NR by  at 9/30/2021 1607    Acceptance, E,D, DU by  at 9/29/2021 1200                   Point: Home exercise program (Done)     Learning Progress Summary           Patient Acceptance, E,TB,D, VU by  at 10/4/2021 1119    Acceptance, E,TB, VU by CHRISTOFER at 10/3/2021 1258    Acceptance, TB,E, VU by   at 10/2/2021 1325    Acceptance, E,TB,D, VU,NR by  at 10/1/2021 1527    Acceptance, E,TB,D, VU,NR by  at 9/30/2021 1607    Acceptance, E,D, DU by PC at 9/29/2021 1200                   Point: Body mechanics (Done)     Learning Progress Summary           Patient Acceptance, E,TB,D, VU by  at 10/4/2021 1119    Acceptance, TB,E, VU by  at 10/2/2021 1325    Acceptance, E,TB,D, VU,NR by  at 10/1/2021 1527    Acceptance, E,TB,D, VU,NR by  at 9/30/2021 1607    Acceptance, E,D, DU by PC at 9/29/2021 1200                   Point: Precautions (Done)     Learning Progress Summary           Patient Acceptance, E,TB,D, VU by  at 10/4/2021 1119    Acceptance, E,TB, VU by  at 10/3/2021 1258    Acceptance, TB,E, VU by  at 10/2/2021 1325    Acceptance, E,TB,D, VU,NR by  at 10/1/2021 1527    Acceptance, E,TB,D, VU,NR by  at 9/30/2021 1607    Acceptance, E,D, DU by PC at 9/29/2021 1200                               User Key     Initials Effective Dates Name Provider Type Discipline     06/16/21 -  Hussein Sin, PT Physical Therapist PT     06/16/21 -  Kellee Phan, PT Physical Therapist PT     03/07/18 -  Janeth Warren, PTA Physical Therapy Assistant PT     05/19/21 -  Kristy Mcmillan, PT Physical Therapist PT              PT Recommendation and Plan     Plan of Care Reviewed With: patient  Progress: improving  Outcome Summary: Pt tolerated treatment well this date. Ambulated 90ft w/ walker and CGA-SBA. Pt is continuing to maintain NWB on R LE w/ KI on w/out concern. Pt is also very compliant w/ bed exercises, and instructed pt to perform them on L LE as well. Awaiting insurance approval for SNF.     Time Calculation:   PT Charges     Row Name 10/04/21 1122             Time Calculation    Start Time  0858  -      Stop Time  0930  -      Time Calculation (min)  32 min  -      PT Received On  10/04/21  -      PT - Next Appointment  10/05/21  -        User Key  (r) =  Recorded By, (t) = Taken By, (c) = Cosigned By    Initials Name Provider Type     Janeth Warren PTA Physical Therapy Assistant        Therapy Charges for Today     Code Description Service Date Service Provider Modifiers Qty    [id number] HC PT THER PROC EA 15 MIN 10/4/2021 Janeth Warren PTA GP 2          PT G-Codes  Outcome Measure Options: AM-PAC 6 Clicks Basic Mobility (PT)  AM-PAC 6 Clicks Score (PT): 20    Janeth Warren PTA  10/4/2021

## 2021-10-04 NOTE — PLAN OF CARE
Goal Outcome Evaluation:  Plan of Care Reviewed With: patient        Progress: improving  Outcome Summary: vss, rt lower extremity dsg c/d/i, neurovascular status wnl, lle neurovascular status wnl, doppler showed acute lle dvt and pt started on eliquis, voiding, had BM after supp, educated on monitoring BG due to diabetes hx, reports adequate pain control, rehab is planned for discharge

## 2021-10-05 LAB
GLUCOSE BLDC GLUCOMTR-MCNC: 116 MG/DL (ref 70–130)
GLUCOSE BLDC GLUCOMTR-MCNC: 119 MG/DL (ref 70–130)
GLUCOSE BLDC GLUCOMTR-MCNC: 151 MG/DL (ref 70–130)
GLUCOSE BLDC GLUCOMTR-MCNC: 154 MG/DL (ref 70–130)

## 2021-10-05 PROCEDURE — 82962 GLUCOSE BLOOD TEST: CPT

## 2021-10-05 PROCEDURE — 97110 THERAPEUTIC EXERCISES: CPT

## 2021-10-05 PROCEDURE — 63710000001 INSULIN LISPRO (HUMAN) PER 5 UNITS: Performed by: ORTHOPAEDIC SURGERY

## 2021-10-05 RX ADMIN — LEVOTHYROXINE SODIUM 112 MCG: 0.11 TABLET ORAL at 08:37

## 2021-10-05 RX ADMIN — OXYCODONE AND ACETAMINOPHEN 1 TABLET: 5; 325 TABLET ORAL at 18:30

## 2021-10-05 RX ADMIN — CETIRIZINE HYDROCHLORIDE 10 MG: 10 TABLET ORAL at 08:37

## 2021-10-05 RX ADMIN — ATORVASTATIN CALCIUM 10 MG: 20 TABLET, FILM COATED ORAL at 08:37

## 2021-10-05 RX ADMIN — MONTELUKAST SODIUM 10 MG: 10 TABLET, FILM COATED ORAL at 21:06

## 2021-10-05 RX ADMIN — APIXABAN 10 MG: 5 TABLET, FILM COATED ORAL at 08:37

## 2021-10-05 RX ADMIN — ICOSAPENT ETHYL 2 G: 1000 CAPSULE ORAL at 08:36

## 2021-10-05 RX ADMIN — PANTOPRAZOLE SODIUM 40 MG: 40 TABLET, DELAYED RELEASE ORAL at 08:39

## 2021-10-05 RX ADMIN — INSULIN LISPRO 2 UNITS: 100 INJECTION, SOLUTION INTRAVENOUS; SUBCUTANEOUS at 16:57

## 2021-10-05 RX ADMIN — OXYCODONE AND ACETAMINOPHEN 1 TABLET: 5; 325 TABLET ORAL at 06:17

## 2021-10-05 RX ADMIN — ICOSAPENT ETHYL 2 G: 1000 CAPSULE ORAL at 17:28

## 2021-10-05 RX ADMIN — FLUTICASONE PROPIONATE 1 SPRAY: 50 SPRAY, METERED NASAL at 08:37

## 2021-10-05 RX ADMIN — OXYCODONE AND ACETAMINOPHEN 1 TABLET: 5; 325 TABLET ORAL at 23:21

## 2021-10-05 RX ADMIN — APIXABAN 10 MG: 5 TABLET, FILM COATED ORAL at 21:06

## 2021-10-05 RX ADMIN — OXYCODONE AND ACETAMINOPHEN 1 TABLET: 5; 325 TABLET ORAL at 00:28

## 2021-10-05 NOTE — CASE MANAGEMENT/SOCIAL WORK
Continued Stay Note  [facility]     Patient Name: Shanika Velez  MRN: [id number]  Today's Date: 10/5/2021    Admit Date: 9/26/2021    Discharge Plan     Row Name 10/05/21 1146       Plan    Plan  [facility] pending pre-cert    Plan Comments  CCP spoke with Gabriele/Jeanette Toure; states they have contacted the  and awaiting to hear back. Will need pre-cert. CCP made outbound call to /Lu Casillas [phone] and left voicemail for return call. Candy VARGAS        Discharge Codes    No documentation.             SAM Jacob

## 2021-10-05 NOTE — PAYOR COMM NOTE
"CONTINUED STAY REVIEW  REF #[id number]  F:  [phone]        Shanika Velez (60 y.o. Female)     Date of Birth Social Security Number Address Home Phone MRN    1960  [id number] [facility] [address] [phone] [id number]    Gnosticism Marital Status          None Single       Admission Date Admission Type Admitting Provider Attending Provider Department, Room/Bed    9/26/21 Emergency Jess Zaidi MD Broaddus, Emmett J., MD [facility] [address] [place], P891/1    Discharge Date Discharge Disposition Discharge Destination                       Attending Provider: Oj Bartlett MD    Allergies: Codeine, Fluoxetine Hcl, Penicillins, Tetracycline, Tetracyclines & Related    Isolation: None   Infection: COVID (History) (09/27/21)   Code Status: CPR    Ht: 175.3 cm (69\")   Wt: 85.3 kg (188 lb)    Admission Cmt: None   Principal Problem: Tibial plateau fracture, right, closed, initial encounter [S82.141A]                 Active Insurance as of 9/26/2021     Primary Coverage     Payor Plan Insurance Group Employer/Plan Group    [facility] [employer]      Payor Plan Address Payor Plan Phone Number Payor Plan Fax Number Effective Dates    [address] [address] [phone]  9/26/2021 - None Entered    [facility] [id number]       Subscriber Name Subscriber Birth Date Member ID       SHANIKA VELEZ 1960            Secondary Coverage     Payor Plan Insurance Group Employer/Plan Group    [employer] PPO [id number]     Payor Plan Address Payor Plan Phone Number Payor Plan Fax Number Effective Dates    [address] [phone]  1/1/2018 - None Entered    [facility] [id number]       Subscriber Name Subscriber Birth Date Member ID       SHANIKA VELEZ 1960 [id number]                 Emergency Contacts      (Rel.) Home Phone Work Phone Mobile Phone    Layla Ochoa (Sister) [phone] -- --            Vital Signs (last day)     Date/Time  "  Temp   Temp src   Pulse   Resp   BP   Patient Position   SpO2    10/05/21 0900   98 (36.7)   Skin   83   16   132/71   Lying   97    10/05/21 0700   97.2 (36.2)   Oral   69   16   104/65   Lying   96    10/05/21 0311   97.3 (36.3)   Skin   76   16   107/75   Sitting   95    10/05/21 0004   97.7 (36.5)   Skin   82   14   116/73   Lying   95    10/04/21 1910   97.1 (36.2)   Skin   96   16   128/70   --   95    10/04/21 1500   97.8 (36.6)   Skin   78   16   115/67   Lying   93    10/04/21 1100   96.9 (36.1)   Skin   85   17   121/67   Lying   94    10/04/21 0700   96.9 (36.1)   Skin   75   18   103/65   Lying   92    10/04/21 0332   98.7 (37.1)   Oral   89   18   117/75   Lying   91              Oxygen Therapy (last day)     Date/Time   SpO2   Device (Oxygen Therapy)   Flow (L/min)   Oxygen Concentration (%)   ETCO2 (mmHg)    10/05/21 0900   97   room air   --   --   --    10/05/21 0830   --   room air   --   --   --    10/05/21 0700   96   room air   --   --   --    10/05/21 0427   --   room air   --   --   --    10/05/21 0311   95   room air   --   --   --    10/05/21 0004   95   room air   --   --   --    10/04/21 2038   --   room air   --   --   --    10/04/21 1910   95   room air   --   --   --    10/04/21 1500   93   room air   --   --   --    10/04/21 1100   94   room air   --   --   --    10/04/21 0700   92   room air   --   --   --    10/04/21 0332   91   room air   --   --   --                Medication Administration Report for Shanika Velez as of 10/05/21 1058    Legend:    Given Hold Not Given Due Canceled Entry Other Actions    Time Time (Time) Time  Time-Action       Discontinued     Completed     Future     MAR Hold     Linked           Medications 10/04/21 10/05/21    apixaban (ELIQUIS) tablet 10 mg  Dose: 10 mg  Freq: Every 12 Hours Scheduled Route: PO  Indications of Use: DVT/PE (active thrombosis)  Start: 10/03/21 2100   End: 10/10/21 2059    Admin Instructions:   Tablet may be crushed and suspended  in 60 mL of water or D5W and immediately delivered via NG tube.     0850     2037           0837     2100             Followed by  apixaban (ELIQUIS) tablet 5 mg  Dose: 5 mg  Freq: Every 12 Hours Scheduled Route: PO  Indications of Use: DVT/PE (active thrombosis)  Start: 10/10/21 2100    Admin Instructions:   Tablet may be crushed and suspended in 60 mL of water or D5W and immediately delivered via NG tube.          atorvastatin (LIPITOR) tablet 10 mg  Dose: 10 mg  Freq: Daily Route: PO  Start: 09/27/21 0945    Admin Instructions:   Avoid grapefruit juice.     0850            0837               cetirizine (zyrTEC) tablet 10 mg  Dose: 10 mg  Freq: Daily Route: PO  Start: 09/27/21 0900    0851            0837               fluticasone (FLONASE) 50 MCG/ACT nasal spray 1 spray  Dose: 1 spray  Freq: Daily Route: NA  Start: 09/27/21 0900    0852            0837               icosapent ethyl (VASCEPA) capsule 2 g  Dose: 2 g  Freq: 2 Times Daily With Meals Route: PO  Start: 09/29/21 0800    Admin Instructions:   Swallow capsule whole.  Do not break open, crush, dissolve, or chew capsule.     0852     1730           0836     1800              insulin lispro (ADMELOG) injection 0-9 Units  Dose: 0-9 Units  Freq: 3 Times Daily Before Meals Route: SC  Start: 09/27/21 0730    Admin Instructions:   Correction - Moderate Dose.  40-60 units/day total insulin dose or average weight, on oral agents    Blood glucose 150-199 mg/dL - 2 units  Blood glucose 200-249 mg/dL - 4 units  Blood glucose 250-299 mg/dL - 6 units  Blood glucose 300-349 mg/dL - 7 units  Blood glucose 350-400 mg/dL - 8 units  Blood glucose greater than 400 mg/dL - 9 units and call provider   Caution: Look alike/sound alike drug alert     0635 (6046) (0930) (6964) [C]     [id number]             levothyroxine (SYNTHROID, LEVOTHROID) tablet 112 mcg  Dose: 112 mcg  Freq: Daily Route: PO  Start: 09/27/21 0900    Admin Instructions:   Take on empty  stomach.     0850            0837               montelukast (SINGULAIR) tablet 10 mg  Dose: 10 mg  Freq: Nightly Route: PO  Start: 09/26/21 2145 2038 2100               pantoprazole (PROTONIX) EC tablet 40 mg  Dose: 40 mg  Freq: Daily Route: PO  Start: 09/27/21 0900    Admin Instructions:   Swallow whole; do not crush, split, or chew.     0850            0839               polyethylene glycol (MIRALAX) packet 17 g  Dose: 17 g  Freq: 2 Times Daily Route: PO  Start: 10/02/21 2100    Admin Instructions:   Use 4-8 ounces of water, tea, or juice for each 17 gram dose.     0850     (2040)           (0836)     2100              sennosides-docusate (PERICOLACE) 8.6-50 MG per tablet 2 tablet  Dose: 2 tablet  Freq: 2 Times Daily Route: PO  Start: 09/30/21 2100    0850     (2040)           (0836)     2100              sodium chloride 0.9 % flush 10 mL  Dose: 10 mL  Freq: Every 12 Hours Scheduled Route: IV  Start: 09/26/21 2145 0852 2039           (0837) [C]     2100              vitamin D (ERGOCALCIFEROL) capsule 50,000 Units  Dose: 50,000 Units  Freq: Weekly Route: PO  Start: 09/30/21 0900        Completed Medications  Medications 10/04/21 10/05/21       ceFAZolin in dextrose (ANCEF) IVPB solution 2 g  Dose: 2 g  Freq: Once Route: IV  Indications of Use: PERIOPERATIVE PHARMACOPROPHYLAXIS  Start: 09/28/21 1037   End: 09/28/21 1105    Admin Instructions:   Caution: Look alike/sound alike drug alert          ceFAZolin in dextrose (ANCEF) IVPB solution 2 g  Dose: 2 g  Freq: Every 8 Hours Route: IV  Indications of Use: PERIOPERATIVE PHARMACOPROPHYLAXIS  Start: 09/28/21 1900   End: 09/29/21 1215    Admin Instructions:   Caution: Look alike/sound alike drug alert          ceFAZolin in dextrose (ANCEF) IVPB solution 2 g  Dose: 2 g  Freq: On Call to O.R. Route: IV  Indications of Use: PERIOPERATIVE PHARMACOPROPHYLAXIS  Start: 09/27/21 0600   End: 09/28/21 1101    Admin Instructions:   Caution: Look alike/sound  alike drug alert          famotidine (PEPCID) injection 20 mg  Dose: 20 mg  Freq: Once Route: IV  Start: 09/28/21 1024   End: 09/28/21 1032    Admin Instructions:   Dilute to 10 mL total volume and give IV push over 2 minutes.          HYDROcodone-acetaminophen (NORCO) 5-325 MG per tablet 1 tablet  Dose: 1 tablet  Freq: Once Route: PO  Start: 09/26/21 1835   End: 09/26/21 1942    Admin Instructions:   [AUREA]    Do not exceed 4 grams of acetaminophen in a 24 hr period. Max dose of 2gm for AST/ALT greater than 120 units/L        If given for pain, use the following pain scale:   Mild Pain = Pain Score of 1-3, CPOT 1-2  Moderate Pain = Pain Score of 4-6, CPOT 3-4  Severe Pain = Pain Score of 7-10, CPOT 5-8         Discontinued Medications  Medications 10/04/21 10/05/21       allopurinol (ZYLOPRIM) tablet 200 mg  Dose: 200 mg  Freq: Daily Route: PO  Start: 09/27/21 0900   End: 10/02/21 1452    Admin Instructions:   ([facility]) Take with food if GI upset occurs.          apixaban (ELIQUIS) tablet 10 mg  Dose: 10 mg  Freq: Every 12 Hours Scheduled Route: PO  Indications of Use: DVT/PE (active thrombosis)  Start: 10/03/21 2100   End: 10/03/21 1625    Admin Instructions:   Tablet may be crushed and suspended in 60 mL of water or D5W and immediately delivered via NG tube.         Followed by  apixaban (ELIQUIS) tablet 5 mg  Dose: 5 mg  Freq: Every 12 Hours Scheduled Route: PO  Indications of Use: DVT/PE (active thrombosis)  Start: 10/10/21 2100   End: 10/03/21 1625    Admin Instructions:   Tablet may be crushed and suspended in 60 mL of water or D5W and immediately delivered via NG tube.          aspirin tablet 325 mg  Dose: 325 mg  Freq: Daily Route: PO  Start: 09/29/21 0900   End: 10/03/21 1625    Admin Instructions:   Do not exceed 4 grams of aspirin in a 24 hr period.    If given for pain, use the following pain scale:   Mild Pain = Pain Score of 1-3, CPOT 1-2  Moderate Pain = Pain Score of 4-6, CPOT 3-4  Severe Pain = Pain  Score of 7-10, CPOT 5-8          icosapent ethyl (VASCEPA) capsule 2 g  Dose: 2 g  Freq: 2 Times Daily With Meals Route: PO  Start: 09/26/21 2145   End: 09/28/21 1756    Admin Instructions:   Swallow capsule whole.  Do not break open, crush, dissolve, or chew capsule.          levothyroxine (SYNTHROID, LEVOTHROID) tablet 112 mcg  Dose: 112 mcg  Freq: Every Early Morning Route: PO  Start: 09/27/21 0600   End: 09/26/21 2113    Admin Instructions:   Take on empty stomach.          polyethylene glycol (MIRALAX) packet 17 g  Dose: 17 g  Freq: Daily Route: PO  Start: 09/30/21 1030   End: 10/02/21 1452    Admin Instructions:   Use 4-8 ounces of water, tea, or juice for each 17 gram dose.          sennosides-docusate (PERICOLACE) 8.6-50 MG per tablet 1 tablet  Dose: 1 tablet  Freq: 2 Times Daily Route: PO  Start: 09/27/21 1000   End: 09/30/21 0944         sodium chloride 0.9 % flush 3 mL  Dose: 3 mL  Freq: Every 12 Hours Scheduled Route: IV  Start: 09/28/21 1024   End: 09/28/21 1221              ,   Medication Administration Report for Shanika Velez as of 10/05/21 1058    Legend:    Given Hold Not Given Due Canceled Entry Other Actions    Time Time (Time) Time  Time-Action       Discontinued     Completed     Future     MAR Hold     Linked           Medications 10/04/21 10/05/21    lactated ringers infusion  Rate: 9 mL/hr Dose: 9 mL/hr  Freq: Continuous Route: IV  Start: 09/28/21 1024        Discontinued Medications  Medications 10/04/21 10/05/21       sodium chloride 0.9 % infusion  Rate: 75 mL/hr Dose: 75 mL/hr  Freq: Continuous Route: IV  Start: 09/26/21 2145   End: 09/29/21 1037               and   Medication Administration Report for Shanika Velez as of 10/05/21 1058    Legend:    Given Hold Not Given Due Canceled Entry Other Actions    Time Time (Time) Time  Time-Action       Discontinued     Completed     Future     MAR Hold     Linked           Medications 10/04/21 10/05/21    acetaminophen (TYLENOL) tablet 650  mg  Dose: 650 mg  Freq: Every 4 Hours PRN Route: PO  PRN Reason: Mild Pain   Start: 09/26/21 2052    Admin Instructions:   Do not exceed 4 grams of acetaminophen in a 24 hr period. Max dose of 2gm for AST/ALT greater than 120 units/L      If given for pain, use the following pain scale:   Mild Pain = Pain Score of 1-3, CPOT 1-2  Moderate Pain = Pain Score of 4-6, CPOT 3-4  Severe Pain = Pain Score of 7-10, CPOT 5-8         Or  acetaminophen (TYLENOL) 160 MG/5ML solution 650 mg  Dose: 650 mg  Freq: Every 4 Hours PRN Route: PO  PRN Reason: Mild Pain   Start: 09/26/21 2052    Admin Instructions:   Do not exceed 4 grams of acetaminophen in a 24 hr period. Max dose of 2gm for AST/ALT greater than 120 units/L      If given for pain, use the following pain scale:   Mild Pain = Pain Score of 1-3, CPOT 1-2  Moderate Pain = Pain Score of 4-6, CPOT 3-4  Severe Pain = Pain Score of 7-10, CPOT 5-8         Or  acetaminophen (TYLENOL) suppository 650 mg  Dose: 650 mg  Freq: Every 4 Hours PRN Route: RE  PRN Reason: Mild Pain   Start: 09/26/21 2052    Admin Instructions:   Do not exceed 4 grams of acetaminophen in a 24 hr period. Max dose of 2gm for AST/ALT greater than 120 units/L      If given for pain, use the following pain scale:   Mild Pain = Pain Score of 1-3, CPOT 1-2  Moderate Pain = Pain Score of 4-6, CPOT 3-4  Severe Pain = Pain Score of 7-10, CPOT 5-8          bisacodyl (DULCOLAX) suppository 10 mg  Dose: 10 mg  Freq: Daily PRN Route: RE  PRN Reason: Constipation  Start: 09/30/21 0945    0135                calamine 8-8 % lotion  Freq: Every 8 Hours PRN Route: TOP  PRN Reason: Itching  Start: 09/27/21 1832    Admin Instructions:   Apply to affected area.          dextrose (D50W) 25 g/ 50mL Intravenous Solution 25 g  Dose: 25 g  Freq: Every 15 Minutes PRN Route: IV  PRN Reason: Low Blood Sugar  PRN Comment: Blood Sugar Less Than 70  Start: 09/26/21 2052    Admin Instructions:   Blood sugar less than 70; patient has IV  access - Unresponsive, NPO or Unable To Safely Swallow          dextrose (GLUTOSE) oral gel 15 g  Dose: 15 g  Freq: Every 15 Minutes PRN Route: PO  PRN Reason: Low Blood Sugar  PRN Comment: Blood sugar less than 70  Start: 09/26/21 2052    Admin Instructions:   BS<70, Patient Alert, Is not NPO, Can safely swallow.          glucagon (human recombinant) (GLUCAGEN DIAGNOSTIC) injection 1 mg  Dose: 1 mg  Freq: Every 15 Minutes PRN Route: SC  PRN Reason: Low Blood Sugar  PRN Comment: Blood Glucose Less Than 70  Start: 09/26/21 2052    Admin Instructions:   Blood Glucose Less Than 70 - Patient Without IV Access - Unresponsive, NPO or Unable To Safely Swallow          morphine injection 1 mg  Dose: 1 mg  Freq: Every 4 Hours PRN Route: IV  PRN Reason: Moderate Pain   Start: 09/26/21 2052   End: 10/03/21 2051    Admin Instructions:   If given for pain, use the following pain scale:  Mild Pain = Pain Score of 1-3, CPOT 1-2  Moderate Pain = Pain Score of 4-6, CPOT 3-4  Severe Pain = Pain Score of 7-10, CPOT 5-8         And  naloxone (NARCAN) injection 0.4 mg  Dose: 0.4 mg  Freq: Every 5 Minutes PRN Route: IV  PRN Reason: Respiratory Depression  Start: 09/26/21 2052    Admin Instructions:   If respiratory rate is less than 8 breaths/minute or patient is difficult to arouse stop any narcotics and contact physician.   Administer slow IV push. Repeat as ordered until patient's respiratory rate is greater than 12 breaths/minute.          ondansetron (ZOFRAN) injection 4 mg  Dose: 4 mg  Freq: Every 6 Hours PRN Route: IV  PRN Reasons: Nausea,Vomiting  Start: 09/26/21 2052    Admin Instructions:   If BOTH ondansetron (ZOFRAN) and promethazine (PHENERGAN) are ordered use ondansetron first and THEN promethazine IF ondansetron is ineffective.          oxyCODONE-acetaminophen (PERCOCET) 5-325 MG per tablet 1 tablet  Dose: 1 tablet  Freq: Every 4 Hours PRN Route: PO  PRN Reason: Moderate Pain   Start: 09/27/21 0757   End: 10/10/21 1044     Admin Instructions:   [AUREA]    Do not exceed 4 grams of acetaminophen in a 24 hr period. Max dose of 2gm for AST/ALT greater than 120 units/L        If given for pain, use the following pain scale:   Mild Pain = Pain Score of 1-3, CPOT 1-2  Moderate Pain = Pain Score of 4-6, CPOT 3-4  Severe Pain = Pain Score of 7-10, CPOT 5-8     0614     2038           0028     0617              sodium chloride 0.9 % flush 10 mL  Dose: 10 mL  Freq: As Needed Route: IV  PRN Reason: Line Care  Start: 09/26/21 2052         sodium chloride 0.9 % flush 10 mL  Dose: 10 mL  Freq: As Needed Route: IV  PRN Reason: Line Care  Start: 09/26/21 1913        Discontinued Medications  Medications 10/04/21 10/05/21       bupivacaine-EPINEPHrine PF (MARCAINE w/EPI) 0.5% -[id number] injection  Freq: As Needed  Start: 09/28/21 1200   End: 09/28/21 1220         diphenhydrAMINE (BENADRYL) capsule 25 mg  Dose: 25 mg  Freq: Every 30 Minutes PRN Route: PO  PRN Reason: Itching  PRN Comment: May repeat x 1  Indications of Use: EXTRAPYRAMIDAL REACTION,PRURITUS  Start: 09/28/21 1208   End: 09/28/21 1350    Admin Instructions:   Caution: Look alike/sound alike drug alert. This med may be ordered in other forms and routes. Before giving verify the last time the drug was given by any route/form.            diphenhydrAMINE (BENADRYL) injection 12.5 mg  Dose: 12.5 mg  Freq: Every 15 Minutes PRN Route: IV  PRN Reason: Itching  PRN Comment: May repeat x 1  Start: 09/28/21 1208   End: 09/28/21 1350    Admin Instructions:   Caution: Look alike/sound alike drug alert. This med may be ordered in other forms and routes. Before giving verify the last time the drug was given by any route/form.            ePHEDrine injection 5 mg  Dose: 5 mg  Freq: Once As Needed Route: IV  PRN Comment: symptomatic hypotension - Notify attending anesthesiologist if this needs to be given  Start: 09/28/21 1222   End: 09/28/21 1350    Admin Instructions:   Caution: Look alike/sound alike drug  alert   Dilute with NS to 5-10 mg/mL.  Central line preferred, if unavailable use large bore IV access with frequent nurse monitoring of IV site.          ePHEDrine injection 5 mg  Dose: 5 mg  Freq: Once As Needed Route: IV  PRN Comment: symptomatic hypotension - Notify attending anesthesiologist if this needs to be given  Start: 09/28/21 1208   End: 09/28/21 1350    Admin Instructions:   Caution: Look alike/sound alike drug alert   Dilute with NS to 5-10 mg/mL.  Central line preferred, if unavailable use large bore IV access with frequent nurse monitoring of IV site.          fentaNYL citrate (PF) (SUBLIMAZE) injection 50 mcg  Dose: 50 mcg  Freq: Every 5 Minutes PRN Route: IV  PRN Reasons: Moderate Pain ,Severe Pain   Start: 09/28/21 1222   End: 09/28/21 1350    Admin Instructions:   May alternate fentanyl with hydromorphone using fentanyl first.    Maximum total dose of fentanyl is 200 mcg.  If given for pain, use the following pain scale:  Mild Pain = Pain Score of 1-3, CPOT 1-2  Moderate Pain = Pain Score of 4-6, CPOT 3-4  Severe Pain = Pain Score of 7-10, CPOT 5-8          fentaNYL citrate (PF) (SUBLIMAZE) injection 50 mcg  Dose: 50 mcg  Freq: Every 10 Minutes PRN Route: IV  PRN Reason: Severe Pain   Start: 09/28/21 1022   End: 09/28/21 1221    Admin Instructions:   Maximum total dose of fentanyl is 100 mcg.  If given for pain, use the following pain scale:  Mild Pain = Pain Score of 1-3, CPOT 1-2  Moderate Pain = Pain Score of 4-6, CPOT 3-4  Severe Pain = Pain Score of 7-10, CPOT 5-8          fentaNYL citrate (PF) (SUBLIMAZE) injection 50 mcg  Dose: 50 mcg  Freq: Every 5 Minutes PRN Route: IV  PRN Reasons: Moderate Pain ,Severe Pain   Start: 09/28/21 1208   End: 09/28/21 1350    Admin Instructions:   May alternate fentanyl with hydromorphone using fentanyl first.    Maximum total dose of fentanyl is 200 mcg.  If given for pain, use the following pain scale:  Mild Pain = Pain Score of 1-3, CPOT 1-2  Moderate  Pain = Pain Score of 4-6, CPOT 3-4  Severe Pain = Pain Score of 7-10, CPOT 5-8          flumazenil (ROMAZICON) injection 0.2 mg  Dose: 0.2 mg  Freq: As Needed Route: IV  PRN Comment: for benzodiazepine induced unresponsiveness or sedation  Indications of Use: BENZODIAZEPINE-INDUCED SEDATION  Start: 09/28/21 1208   End: 09/28/21 1350    Admin Instructions:   Notify Anesthesia if given  ** give IV over 15-30 seconds **          hydrALAZINE (APRESOLINE) injection 5 mg  Dose: 5 mg  Freq: Every 10 Minutes PRN Route: IV  PRN Reason: High Blood Pressure  PRN Comment: for systolic blood pressure greater than 180 mmHg or diastolic blood pressure greater than 105 mmHg  Start: 09/28/21 1208   End: 09/28/21 1350    Admin Instructions:   Up to 20 mg.  Caution: Look alike/sound alike drug alert          HYDROcodone-acetaminophen (NORCO) 5-325 MG per tablet 1 tablet  Dose: 1 tablet  Freq: Every 4 Hours PRN Route: PO  PRN Reason: Moderate Pain   Start: 09/26/21 2053   End: 09/27/21 0757    Admin Instructions:   [AUREA]    Do not exceed 4 grams of acetaminophen in a 24 hr period. Max dose of 2gm for AST/ALT greater than 120 units/L        If given for pain, use the following pain scale:   Mild Pain = Pain Score of 1-3, CPOT 1-2  Moderate Pain = Pain Score of 4-6, CPOT 3-4  Severe Pain = Pain Score of 7-10, CPOT 5-8          HYDROcodone-acetaminophen (NORCO) 7.5-325 MG per tablet 1 tablet  Dose: 1 tablet  Freq: Once As Needed Route: PO  PRN Reason: Moderate Pain   Start: 09/28/21 1222   End: 09/28/21 1350    Admin Instructions:   [AUREA]    Do not exceed 4 grams of acetaminophen in a 24 hr period. Max dose of 2gm for AST/ALT greater than 120 units/L        If given for pain, use the following pain scale:   Mild Pain = Pain Score of 1-3, CPOT 1-2  Moderate Pain = Pain Score of 4-6, CPOT 3-4  Severe Pain = Pain Score of 7-10, CPOT 5-8          HYDROmorphone (DILAUDID) injection 0.5 mg  Dose: 0.5 mg  Freq: Every 5 Minutes PRN Route: IV  PRN  Reasons: Moderate Pain ,Severe Pain   Start: 09/28/21 1222   End: 09/28/21 1350    Admin Instructions:   May alternate fentanyl with hydromorphone using fentanyl first.    Maximum total dose of hydromorphone is 2 mg.  If given for pain, use the following pain scale:  Mild Pain = Pain Score of 1-3, CPOT 1-2  Moderate Pain = Pain Score of 4-6, CPOT 3-4  Severe Pain = Pain Score of 7-10, CPOT 5-8          ibuprofen (ADVIL,MOTRIN) tablet 600 mg  Dose: 600 mg  Freq: Once As Needed Route: PO  PRN Reason: Mild Pain   Start: 09/28/21 1208   End: 09/28/21 1350    Admin Instructions:   If given for pain, use the following pain scale:  Mild Pain = Pain Score of 1-3, CPOT 1-2  Moderate Pain = Pain Score of 4-6, CPOT 3-4  Severe Pain = Pain Score of 7-10, CPOT 5-8          labetalol (NORMODYNE,TRANDATE) injection 5 mg  Dose: 5 mg  Freq: Every 5 Minutes PRN Route: IV  PRN Reason: High Blood Pressure  PRN Comment: for systolic blood pressure greater than 180 mmHg or diastolic blood pressure greater than 105 mmHg  Start: 09/28/21 1208   End: 09/28/21 1350    Admin Instructions:   Hold for heart rate less than 60.  Give by slow IV Push each 20mg (or less) over 2 minutes          lidocaine PF 1% (XYLOCAINE) injection 0.5 mL  Dose: 0.5 mL  Freq: Once As Needed Route: IJ  PRN Comment: IV Start  Start: 09/28/21 1022   End: 09/28/21 1221         midazolam (VERSED) injection 1 mg  Dose: 1 mg  Freq: Every 10 Minutes PRN Route: IV  PRN Comment: Anxiety prophylaxis, Pre-op comfort  Start: 09/28/21 1022   End: 09/28/21 1221    Admin Instructions:   May repeat dose in 10 minutes one time then contact provider for additional orders.            ondansetron (ZOFRAN) injection 4 mg  Dose: 4 mg  Freq: Once As Needed Route: IV  PRN Reasons: Nausea,Vomiting  Indications of Use: POSTOPERATIVE NAUSEA AND VOMITING  Start: 09/28/21 1208   End: 09/28/21 1350    Admin Instructions:   If BOTH ondansetron (ZOFRAN) and promethazine (PHENERGAN) are ordered use  ondansetron first and THEN promethazine IF ondansetron is ineffective.          oxyCODONE-acetaminophen (PERCOCET)  MG per tablet 1 tablet  Dose: 1 tablet  Freq: Every 4 Hours PRN Route: PO  PRN Reason: Severe Pain   Start: 09/28/21 1222   End: 09/28/21 1350    Admin Instructions:   [AUREA]    Do not exceed 4 grams of acetaminophen in a 24 hr period. Max dose of 2gm for AST/ALT greater than 120 units/L        If given for pain, use the following pain scale:   Mild Pain = Pain Score of 1-3, CPOT 1-2  Moderate Pain = Pain Score of 4-6, CPOT 3-4  Severe Pain = Pain Score of 7-10, CPOT 5-8          oxyCODONE-acetaminophen (PERCOCET) 5-325 MG per tablet 2 tablet  Dose: 2 tablet  Freq: Every 4 Hours PRN Route: PO  PRN Reason: Severe Pain   Start: 09/27/21 0757   End: 10/03/21 1051    Admin Instructions:   [AUREA]    Do not exceed 4 grams of acetaminophen in a 24 hr period. Max dose of 2gm for AST/ALT greater than 120 units/L        If given for pain, use the following pain scale:   Mild Pain = Pain Score of 1-3, CPOT 1-2  Moderate Pain = Pain Score of 4-6, CPOT 3-4  Severe Pain = Pain Score of 7-10, CPOT 5-8          promethazine (PHENERGAN) suppository 25 mg  Dose: 25 mg  Freq: Once As Needed Route: RE  PRN Reasons: Nausea,Vomiting  Start: 09/28/21 1222   End: 09/28/21 1350    Admin Instructions:   If BOTH ondansetron (ZOFRAN) and promethazine (PHENERGAN) are ordered use ondansetron first and THEN promethazine IF ondansetron is ineffective.         Or  promethazine (PHENERGAN) tablet 25 mg  Dose: 25 mg  Freq: Once As Needed Route: PO  PRN Reasons: Nausea,Vomiting  Start: 09/28/21 1222   End: 09/28/21 1350    Admin Instructions:   If BOTH ondansetron (ZOFRAN) and promethazine (PHENERGAN) are ordered use ondansetron first and THEN promethazine IF ondansetron is ineffective.            promethazine (PHENERGAN) suppository 25 mg  Dose: 25 mg  Freq: Once As Needed Route: RE  PRN Reasons: Nausea,Vomiting  Start: 09/28/21  "1208   End: 21 1350    Admin Instructions:   If BOTH ondansetron (ZOFRAN) and promethazine (PHENERGAN) are ordered use ondansetron first and THEN promethazine IF ondansetron is ineffective.         Or  promethazine (PHENERGAN) tablet 25 mg  Dose: 25 mg  Freq: Once As Needed Route: PO  PRN Reasons: Nausea,Vomiting  Start: 21 1208   End: 21 1350    Admin Instructions:   If BOTH ondansetron (ZOFRAN) and promethazine (PHENERGAN) are ordered use ondansetron first and THEN promethazine IF ondansetron is ineffective.           sodium chloride (NS) irrigation solution  Freq: As Needed  Start: 21 1104   End: 21 1214         sodium chloride 0.9 % flush 3-10 mL  Dose: 3-10 mL  Freq: As Needed Route: IV  PRN Reason: Line Care  Start: 21 1022   End: 21 1221         sterile water irrigation solution  Freq: As Needed  Start: 21 1104   End: 21 1214                     Physician Progress Notes       Oj Bartlett MD at 10/05/21 0920          DAILY PROGRESS NOTE  [facility]    Patient Identification:  Name: Shanika Velez  Age: 60 y.o.  Sex: female  :  1960  MRN: [id number]         Primary Care Physician: Jake Kidd MD      Subjective  Patient with no complaints today.    Objective:  General Appearance:  Comfortable, well-appearing, in no acute distress and not in pain.    Vital signs: (most recent): Blood pressure 104/65, pulse 69, temperature 97.2 °F (36.2 °C), temperature source Oral, resp. rate 16, height 175.3 cm (69\"), weight 85.3 kg (188 lb), SpO2 96 %.    Lungs:  Normal effort and normal respiratory rate.  Breath sounds clear to auscultation.    Heart: Normal rate.  Regular rhythm.    Extremities: There is no dependent edema.    Neurological: Patient is alert and oriented to person, place and time.    Skin:  Warm and dry.                Vital signs in last 24 hours:  Temp:  [96.9 °F (36.1 °C)-97.8 °F (36.6 °C)] 97.2 °F (36.2 " °C)  Heart Rate:  [69-96] 69  Resp:  [14-17] 16  BP: (104-128)/(65-75) 104/65    Intake/Output:    Intake/Output Summary (Last 24 hours) at 10/5/2021 0920  Last data filed at 10/5/2021 0801  Gross per 24 hour   Intake 1360 ml   Output --   Net 1360 ml         Results from last 7 days   Lab Units 10/04/21  1013 09/29/21  0617   WBC 10*3/mm3 8.88 10.19   HEMOGLOBIN g/dL 11.2* 10.8*   PLATELETS 10*3/mm3 304 211     Results from last 7 days   Lab Units 10/04/21  1013 09/29/21  0617   SODIUM mmol/L 139 141   POTASSIUM mmol/L 3.9 4.4   CHLORIDE mmol/L 102 106   CO2 mmol/L 24.1 23.7   BUN mg/dL 11 10   CREATININE mg/dL 0.57 0.55*   GLUCOSE mg/dL 121* 154*   Estimated Creatinine Clearance: 120.8 mL/min (by C-G formula based on SCr of 0.57 mg/dL).  Results from last 7 days   Lab Units 10/04/21  1013 09/29/21  0617   CALCIUM mg/dL 9.4 9.3         Assessment:  · Right bicondylar tibial plateau fracture with severe depression of the lateral tibial plateau    ? S/p open reduction and internal fixation of bicondylar right tibial plateau fracture by Dr. Tomlinson on 9/28/21   ? Orthopedic surgery notes that she is high risk for need for future knee replacement.   ? She is nonweightbearing for 2 months  ? Active range of motion for the knee only  ? Needs to follow up with Dr. Tomlinson in 3 weeks  · Dm2-a1c 6.5. orals held. Glucose has largely been adequately controlled. Continue ssi.   · DVT: Patient now on Eliquis.  ASA discontinued.  · Hypothyroidism-synthroid  · Asthma-singular  · Hyperlipidemia-atorvastatin, vascepa  · GERD-PPI  · Vit D deficiency-D2.   · Full code.      Plan:  Please see above.  Awaiting pre-CERT for transfer to rehab.  Discussed with patient.    Oj Bartlett MD  10/5/2021  09:20 EDT      Electronically signed by Oj Bartlett MD at 10/05/21 0921           Erin, Malka, RN   Registered Nurse   Orthopedics   Plan of Care   Signed   Date of Service:  10/05/21 0112   Creation Time:  10/05/21 0112             Signed             Goal Outcome Evaluation:  Progress: improving  Outcome Summary: 59 yo female POD 8 R tibial ORIF. VSS. Pt on room air. Dressing CDI. Pain managed with prn pain meds and ice therapy. Pt voiding well. x 1 assit with walker. Leg immobilizer in place. educated on skin integrity and frequent position changes. Plans for d/c to rehab in am. will continue to monitor.

## 2021-10-05 NOTE — PROGRESS NOTES
"DAILY PROGRESS NOTE  Psychiatric    Patient Identification:  Name: Shanika Velez  Age: 60 y.o.  Sex: female  :  1960  MRN: [id number]         Primary Care Physician: Jake Kidd MD      Subjective  Patient with no complaints today.    Objective:  General Appearance:  Comfortable, well-appearing, in no acute distress and not in pain.    Vital signs: (most recent): Blood pressure 104/65, pulse 69, temperature 97.2 °F (36.2 °C), temperature source Oral, resp. rate 16, height 175.3 cm (69\"), weight 85.3 kg (188 lb), SpO2 96 %.    Lungs:  Normal effort and normal respiratory rate.  Breath sounds clear to auscultation.    Heart: Normal rate.  Regular rhythm.    Extremities: There is no dependent edema.    Neurological: Patient is alert and oriented to person, place and time.    Skin:  Warm and dry.                Vital signs in last 24 hours:  Temp:  [96.9 °F (36.1 °C)-97.8 °F (36.6 °C)] 97.2 °F (36.2 °C)  Heart Rate:  [69-96] 69  Resp:  [14-17] 16  BP: (104-128)/(65-75) 104/65    Intake/Output:    Intake/Output Summary (Last 24 hours) at 10/5/2021 0920  Last data filed at 10/5/2021 0801  Gross per 24 hour   Intake 1360 ml   Output --   Net 1360 ml         Results from last 7 days   Lab Units 10/04/21  1013 21  0617   WBC 10*3/mm3 8.88 10.19   HEMOGLOBIN g/dL 11.2* 10.8*   PLATELETS 10*3/mm3 304 211     Results from last 7 days   Lab Units 10/04/21  1013 21  0617   SODIUM mmol/L 139 141   POTASSIUM mmol/L 3.9 4.4   CHLORIDE mmol/L 102 106   CO2 mmol/L 24.1 23.7   BUN mg/dL 11 10   CREATININE mg/dL 0.57 0.55*   GLUCOSE mg/dL 121* 154*   Estimated Creatinine Clearance: 120.8 mL/min (by C-G formula based on SCr of 0.57 mg/dL).  Results from last 7 days   Lab Units 10/04/21  1013 21  0617   CALCIUM mg/dL 9.4 9.3         Assessment:  · Right bicondylar tibial plateau fracture with severe depression of the lateral tibial plateau    ? S/p open reduction and internal " fixation of bicondylar right tibial plateau fracture by Dr. Tomlinson on 9/28/21   ? Orthopedic surgery notes that she is high risk for need for future knee replacement.   ? She is nonweightbearing for 2 months  ? Active range of motion for the knee only  ? Needs to follow up with Dr. Tomlinson in 3 weeks  · Dm2-a1c 6.5. orals held. Glucose has largely been adequately controlled. Continue ssi.   · DVT: Patient now on Eliquis.  ASA discontinued.  · Hypothyroidism-synthroid  · Asthma-singular  · Hyperlipidemia-atorvastatin, vascepa  · GERD-PPI  · Vit D deficiency-D2.   · Full code.      Plan:  Please see above.  Awaiting pre-CERT for transfer to rehab.  Discussed with patient.    Oj Bartlett MD  10/5/2021  09:20 EDT

## 2021-10-05 NOTE — PLAN OF CARE
"Goal Outcome Evaluation:           Progress: improving  Outcome Summary: 61 y/o s/POD 8 R tibial plateau reduction internal fixation. Pt up w/ assist x1 to BRP. Pt non-wt bearing to RLE, immobilizer utilized throughout shift. Pt voiding well. Apple Dsg c/d/i, and flashing \"OK\" green indicator. Neuro checks WNL. Pain managed via PO pills. No IV access currently. VSS. Educated pt on glucose control. Plan to d/c to rehab, awaiting precert. Will CTM.    APPLE dsg battery monitor no longer flashing green. Will CTM.   "

## 2021-10-05 NOTE — PLAN OF CARE
Goal Outcome Evaluation:           Progress: improving  Outcome Summary: 61 yo female POD 8 R tibial ORIF. VSS. Pt on room air. Dressing CDI. Pain managed with prn pain meds and ice therapy. Pt voiding well. x 1 assit with walker. Leg immobilizer in place. educated on skin integrity and frequent position changes. Plans for d/c to rehab in am. will continue to monitor.

## 2021-10-05 NOTE — THERAPY TREATMENT NOTE
Patient Name: Shanika Velez  : 1960    MRN: [id number]                              Today's Date: 10/5/2021       Admit Date: 2021    Visit Dx:     ICD-10-CM ICD-9-CM   1. Closed fracture of right tibial plateau, initial encounter  S82.141A 823.00   2. Tibial plateau fracture, right, closed, initial encounter  S82.141A 823.00   3. Vitamin D deficiency  E55.9 268.9   4. Acquired hypothyroidism  E03.9 244.9     Patient Active Problem List   Diagnosis   • Dyslipidemia   • Hypothyroidism   • Type 2 diabetes mellitus (HCC)   • Vitamin D deficiency   • Atopic rhinitis   • Asthma   • Osteoarthritis   • GERD (gastroesophageal reflux disease)   • Diverticulosis   • Hyperlipidemia   • Inflammatory bowel disease   • Tibial plateau fracture, right, closed, initial encounter     Past Medical History:   Diagnosis Date   • Dyslipidemia    • Hyperlipidemia    • Hypothyroidism    • Pneumonia    • PONV (postoperative nausea and vomiting)    • Type 2 diabetes mellitus (HCC)    • Vitamin D deficiency      Past Surgical History:   Procedure Laterality Date   • APPENDECTOMY     • CHOLECYSTECTOMY  UNKNOWN   • TIBIAL PLATEAU OPEN REDUCTION INTERNAL FIXATION Right 2021    Procedure: TIBIAL PLATEAU OPEN REDUCTION INTERNAL FIXATION;  Surgeon: Eitan Tomlinson II, MD;  Location: [facility];  Service: Orthopedics;  Laterality: Right;     General Information     Row Name 10/05/21 1446          Physical Therapy Time and Intention    Document Type  therapy note (daily note)  -     Mode of Treatment  physical therapy  -     Row Name 10/05/21 1446          General Information    Existing Precautions/Restrictions  fall;non-weight bearing KI on R LE  -     Row Name 10/05/21 1446          Cognition    Orientation Status (Cognition)  oriented x 4  -       User Key  (r) = Recorded By, (t) = Taken By, (c) = Cosigned By    Initials Name Provider Type     Janeth Warren, PTA Physical Therapy Assistant         Mobility     Row Name 10/05/21 1447          Bed Mobility    Bed Mobility  supine-sit;sit-supine  -SM     Supine-Sit [place] (Bed Mobility)  modified independence  -SM     Sit-Supine [place] (Bed Mobility)  modified independence  -SM     Assistive Device (Bed Mobility)  bed rails;head of bed elevated  -     Row Name 10/05/21 1447          Sit-Stand Transfer    Sit-Stand [place] (Transfers)  standby assist  -     Assistive Device (Sit-Stand Transfers)  walker, standard  -     Row Name 10/05/21 1447          Gait/Stairs (Locomotion)    [place] Level (Gait)  standby assist  -     Assistive Device (Gait)  walker, standard  -     Distance in Feet (Gait)  100ft x2  -SM     Deviations/Abnormal Patterns (Gait)  gretchen decreased;stride length decreased  -     Bilateral Gait Deviations  forward flexed posture  -SM     Right Sided Gait Deviations  weight shift ability decreased NWB  -SM     [place] Level (Stairs)  stand by assist;contact guard  -     Handrail Location (Stairs)  both sides  -     Number of Steps (Stairs)  4  -SM     Ascending Technique (Stairs)  bumping  -     Descending Technique (Stairs)  other (see comments) hopping on L LE  -       User Key  (r) = Recorded By, (t) = Taken By, (c) = Cosigned By    Initials Name Provider Type    Janeth Norman, DEN Physical Therapy Assistant        Obj/Interventions    No documentation.       Goals/Plan    No documentation.       Clinical Impression     Row Name 10/05/21 1441          Pain    Additional Documentation  Pain Scale: Numbers Pre/Post-Treatment (Group)  -[facility] Name 10/05/21 1440          Pain Scale: Numbers Pre/Post-Treatment    Pretreatment Pain Rating  2/10  -     Posttreatment Pain Rating  2/10  -     Pain Location - Side  Right  -SM     Pain Location  knee  -     Pain Intervention(s)  Repositioned;Ambulation/increased activity;Rest  -     Row Name 10/05/21 5046          Positioning and  Restraints    Pre-Treatment Position  in bed  -SM     Post Treatment Position  bed  -SM     In Bed  supine;call light within reach;encouraged to call for assist  -SM       User Key  (r) = Recorded By, (t) = Taken By, (c) = Cosigned By    Initials Name Provider Type    Janeth Norman PTA Physical Therapy Assistant        Outcome Measures     Row Name 10/05/21 1450          How much help from another person do you currently need...    Turning from your back to your side while in flat bed without using bedrails?  4  -SM     Moving from lying on back to sitting on the side of a flat bed without bedrails?  3  -SM     Moving to and from a bed to a chair (including a wheelchair)?  3  -SM     Standing up from a chair using your arms (e.g., wheelchair, bedside chair)?  4  -SM     Climbing 3-5 steps with a railing?  3  -SM     To walk in hospital room?  3  -SM     AM-PAC 6 Clicks Score (PT)  20  -     Row Name 10/05/21 1450          Functional Assessment    Outcome Measure Options  AM-PAC 6 Clicks Basic Mobility (PT)  -       User Key  (r) = Recorded By, (t) = Taken By, (c) = Cosigned By    Initials Name Provider Type    Janeth Norman PTA Physical Therapy Assistant                       Physical Therapy Education                 Title: PT OT SLP Therapies (Done)     Topic: Physical Therapy (Done)     Point: Mobility training (Done)     Learning Progress Summary           Patient Acceptance, E,TB,D, VU,NR by  at 10/5/2021 1450    Acceptance, E,TB,D, VU by  at 10/4/2021 1119    Acceptance, E,TB, VU by CHRISTOFER at 10/3/2021 1258    Acceptance, TB,E, VU by  at 10/2/2021 1325    Acceptance, E,TB,D, VU,NR by  at 10/1/2021 1527    Acceptance, E,TB,D, VU,NR by  at 9/30/2021 1607    Acceptance, E,D, DU by  at 9/29/2021 1200                   Point: Home exercise program (Done)     Learning Progress Summary           Patient Acceptance, E,TB,D, VU,NR by  at 10/5/2021 1450    Acceptance, E,TB,D, VU by   at 10/4/2021 1119    Acceptance, E,TB, VU by  at 10/3/2021 1258    Acceptance, TB,E, VU by  at 10/2/2021 1325    Acceptance, E,TB,D, VU,NR by  at 10/1/2021 1527    Acceptance, E,TB,D, VU,NR by  at 9/30/2021 1607    Acceptance, E,D, DU by  at 9/29/2021 1200                   Point: Body mechanics (Done)     Learning Progress Summary           Patient Acceptance, E,TB,D, VU,NR by  at 10/5/2021 1450    Acceptance, E,TB,D, VU by  at 10/4/2021 1119    Acceptance, TB,E, VU by  at 10/2/2021 1325    Acceptance, E,TB,D, VU,NR by  at 10/1/2021 1527    Acceptance, E,TB,D, VU,NR by  at 9/30/2021 1607    Acceptance, E,D, DU by  at 9/29/2021 1200                   Point: Precautions (Done)     Learning Progress Summary           Patient Acceptance, E,TB,D, VU,NR by  at 10/5/2021 1450    Acceptance, E,TB,D, VU by  at 10/4/2021 1119    Acceptance, E,TB, VU by  at 10/3/2021 1258    Acceptance, TB,E, VU by  at 10/2/2021 1325    Acceptance, E,TB,D, VU,NR by  at 10/1/2021 1527    Acceptance, E,TB,D, VU,NR by  at 9/30/2021 1607    Acceptance, E,D, DU by  at 9/29/2021 1200                               User Key     Initials Effective Dates Name Provider Type Discipline     06/16/21 -  Hussein Sin, PT Physical Therapist PT     06/16/21 -  Kellee Phan PT Physical Therapist PT     03/07/18 -  Janeth Warren, DEN Physical Therapy Assistant PT     05/19/21 -  Kristy Mcmillan, LEELA Physical Therapist PT              PT Recommendation and Plan     Plan of Care Reviewed With: patient  Progress: improving  Outcome Summary: Pt tolerated treatment well this date. Increased gait distance to 100ft x2 w/ walker and SBA. Pt stated her UEs felt fatigued afterwards, and required occasional standing rest break, as well as a seated rest break alf. Instructed on and performed stairs, using bumping technique to ascend, and hopping on L LE to descend. Encouraged pt to perform exercises on  own during the day.     Time Calculation:   PT Charges     Row Name 10/05/21 1453             Time Calculation    Start Time  1120  -      Stop Time  1147  -      Time Calculation (min)  27 min  -      PT Received On  10/05/21  -      PT - Next Appointment  10/06/21  -        User Key  (r) = Recorded By, (t) = Taken By, (c) = Cosigned By    Initials Name Provider Type     Janeth Warren PTA Physical Therapy Assistant        Therapy Charges for Today     Code Description Service Date Service Provider Modifiers Qty    55569209744 HC PT THER PROC EA 15 MIN 10/4/2021 Janeth Warren PTA GP 2    48322969810 HC PT THER PROC EA 15 MIN 10/5/2021 Janeth Warren PTA  2          PT G-Codes  Outcome Measure Options: AM-PAC 6 Clicks Basic Mobility (PT)  AM-PAC 6 Clicks Score (PT): 20    Janeth Warren PTA  10/5/2021

## 2021-10-05 NOTE — PLAN OF CARE
Goal Outcome Evaluation:  Plan of Care Reviewed With: patient        Progress: improving  Outcome Summary: Pt tolerated treatment well this date. Increased gait distance to 100ft x2 w/ walker and SBA. Pt stated her UEs felt fatigued afterwards, and required occasional standing rest break, as well as a seated rest break FDC. Instructed on and performed stairs, using bumping technique to ascend, and hopping on L LE to descend. Encouraged pt to perform exercises on own during the day.

## 2021-10-06 LAB
GLUCOSE BLDC GLUCOMTR-MCNC: 126 MG/DL (ref 70–130)
GLUCOSE BLDC GLUCOMTR-MCNC: 142 MG/DL (ref 70–130)
GLUCOSE BLDC GLUCOMTR-MCNC: 145 MG/DL (ref 70–130)
GLUCOSE BLDC GLUCOMTR-MCNC: 148 MG/DL (ref 70–130)

## 2021-10-06 PROCEDURE — 82962 GLUCOSE BLOOD TEST: CPT

## 2021-10-06 RX ADMIN — ICOSAPENT ETHYL 2 G: 1000 CAPSULE ORAL at 08:04

## 2021-10-06 RX ADMIN — OXYCODONE AND ACETAMINOPHEN 1 TABLET: 5; 325 TABLET ORAL at 14:36

## 2021-10-06 RX ADMIN — FLUTICASONE PROPIONATE 1 SPRAY: 50 SPRAY, METERED NASAL at 08:05

## 2021-10-06 RX ADMIN — OXYCODONE AND ACETAMINOPHEN 1 TABLET: 5; 325 TABLET ORAL at 08:02

## 2021-10-06 RX ADMIN — PANTOPRAZOLE SODIUM 40 MG: 40 TABLET, DELAYED RELEASE ORAL at 08:04

## 2021-10-06 RX ADMIN — LEVOTHYROXINE SODIUM 112 MCG: 0.11 TABLET ORAL at 08:04

## 2021-10-06 RX ADMIN — MONTELUKAST SODIUM 10 MG: 10 TABLET, FILM COATED ORAL at 21:18

## 2021-10-06 RX ADMIN — APIXABAN 10 MG: 5 TABLET, FILM COATED ORAL at 21:18

## 2021-10-06 RX ADMIN — ICOSAPENT ETHYL 2 G: 1000 CAPSULE ORAL at 18:06

## 2021-10-06 RX ADMIN — CETIRIZINE HYDROCHLORIDE 10 MG: 10 TABLET ORAL at 08:04

## 2021-10-06 RX ADMIN — APIXABAN 10 MG: 5 TABLET, FILM COATED ORAL at 08:03

## 2021-10-06 RX ADMIN — OXYCODONE AND ACETAMINOPHEN 1 TABLET: 5; 325 TABLET ORAL at 21:17

## 2021-10-06 RX ADMIN — ATORVASTATIN CALCIUM 10 MG: 20 TABLET, FILM COATED ORAL at 08:03

## 2021-10-06 NOTE — PLAN OF CARE
Goal Outcome Evaluation:           Progress: improving  Outcome Summary: 59 y/o s/POD 9 Right tibial plateau reduction internal fixation. Pt up w/ assist x1, non-wt bearing to RLE. Immobilizer remains in place. Voiding well. C/O loose stool this shift, d/c stool softner and laxative. Dsg changed this shift per order. Neuro checks WNL, no c/o numb/ting. Pain managed via PO pills. No IV access. Educated on glucose control. Plan to D/C to rehab pending insurance VS Home w/ HH. Will CTM.

## 2021-10-06 NOTE — PROGRESS NOTES
"Patient resting comfortably this morning.  Awaiting pre-CERT on rehab.  Seems to be mobilizing quite well.  No new recommendations    R \"Brennon\" Davi GILLIS MD  Orthopaedic Surgery  [facility]  [phone] - [place] [facility]  [phone] - [place] Office    "
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
## 2021-10-06 NOTE — SIGNIFICANT NOTE
10/06/21 1624   OTHER   Discipline physical therapy assistant   Rehab Time/Intention   Session Not Performed patient/family declined, not feeling well  (pt declined at the moment d/t having diarrhea; PT will f/u tomorrow)   Recommendation   PT - Next Appointment 10/07/21

## 2021-10-06 NOTE — PROGRESS NOTES
Continued Stay Note  [facility]     Patient Name: Shanika Velez  MRN: [id number]  Today's Date: 10/6/2021    Admit Date: 9/26/2021    Discharge Plan     Row Name 10/06/21 1610       Plan    Plan Comments  Spoke with pt, per physical therapy notes pt is doing well at this time and patient does feel comfortable going home if needed. If [employer] does not have approval patient states she would like to just d/c home with HH. Disucssed options of HH and patient is agreeable to d/c with [facility]. Referral will be sent in [facility]. CCP to follow.    Row Name 10/06/21 9091       Plan    Plan Comments  Spoke with [employer] who states they are still pending approval for Auto claim. CCP to follow.        Discharge Codes    No documentation.             Jazlyn Sanchez RN

## 2021-10-06 NOTE — PROGRESS NOTES
Continued Stay Note  [facility]     Patient Name: Shanika Velez  MRN: [id number]  Today's Date: 10/6/2021    Admit Date: 9/26/2021    Discharge Plan     Row Name 10/06/21 1607       Plan    Plan Comments  Spoke with [employer] who states they are still pending approval for Auto claim. CCP to follow.        Discharge Codes    No documentation.             Jazlyn Sanchez RN

## 2021-10-06 NOTE — PLAN OF CARE
Goal Outcome Evaluation:           Progress: no change  Outcome Summary: VSS overnight, no changes occurred, patient was able to ambulate with a walker multiple times throughout the night.  Still waiting on precert to rehab.

## 2021-10-06 NOTE — PAYOR COMM NOTE
"Shanika Velez (60 y.o. Female)     PLEASE SEE ATTACHED CLINICAL REVIEW.    REF#[id number]    PLEASE CALL  OR  [phone]    THANK YOU    RENEE BARBER LPN CCP    Date of Birth Social Security Number Address Home Phone MRN    1960  [id number] [facility] [id number] [phone] [id number]    Anabaptist Marital Status          None Single       Admission Date Admission Type Admitting Provider Attending Provider Department, Room/Bed    9/26/21 Emergency Jess Zaidi MD Richards, Stephen J, MD [facility] [address] [place], P891/1    Discharge Date Discharge Disposition Discharge Destination                       Attending Provider: Sammy Kim MD    Allergies: Codeine, Fluoxetine Hcl, Penicillins, Tetracycline, Tetracyclines & Related    Isolation: None   Infection: COVID (History) (09/27/21)   Code Status: CPR    Ht: 175.3 cm (69\")   Wt: 85.3 kg (188 lb)    Admission Cmt: None   Principal Problem: Tibial plateau fracture, right, closed, initial encounter [S82.141A]                 Active Insurance as of 9/26/2021     Primary Coverage     Payor Plan Insurance Group Employer/Plan Group    [facility] [employer]      Payor Plan Address Payor Plan Phone Number Payor Plan Fax Number Effective Dates    [address] [address] [phone]  9/26/2021 - None Entered    [facility] [address]       Subscriber Name Subscriber Birth Date Member ID       SHANIKA VELEZ 1960            Secondary Coverage     Payor Plan Insurance Group Employer/Plan Group    [employer] PPO [id number]     Payor Plan Address Payor Plan Phone Number Payor Plan Fax Number Effective Dates    [address] [phone]  1/1/2018 - None Entered    [facility] [id number]       Subscriber Name Subscriber Birth Date Member ID       SHANIKA VELEZ 1960 [id number]                 Emergency Contacts      (Rel.) Home Phone Work Phone Mobile Phone    " Layla Ochoa (Sister) [phone] -- --            Oxygen Therapy (last day)     Date/Time   SpO2   Device (Oxygen Therapy)   Flow (L/min)   Oxygen Concentration (%)   ETCO2 (mmHg)    10/06/21 1500   98   room air   --   --   --    10/06/21 1100   99   room air   --   --   --    10/06/21 0802   --   room air   --   --   --    10/06/21 0643   96   room air   --   --   --    10/05/21 2310   97   room air   --   --   --    10/05/21 1930   94   room air   --   --   --    10/05/21 1509   97   room air   --   --   --    10/05/21 0900   97   room air   --   --   --    10/05/21 0830   --   room air   --   --   --    10/05/21 0700   96   room air   --   --   --    10/05/21 0427   --   room air   --   --   --    10/05/21 0311   95   room air   --   --   --    10/05/21 0004   95   room air   --   --   --              Intake & Output (last day)       10/05 0701 - 10/06 0700 10/06 0701 - 10/07 0700    P.O. 1560 720    Total Intake(mL/kg) 1560 (18.3) 720 (8.4)    Urine (mL/kg/hr) 250 (0.1)     Total Output 250     Net +1310 +720          Urine Unmeasured Occurrence 5 x 3 x    Stool Unmeasured Occurrence 1 x 3 x        Lines, Drains & Airways    Active LDAs     None                  Medication Administration Report for Shanika Velez as of 10/06/21 1633    Legend:    Given Hold Not Given Due Canceled Entry Other Actions    Time Time (Time) Time  Time-Action       Discontinued     Completed     Future     MAR Hold     Linked           Medications 10/04/21 10/05/21 10/06/21    acetaminophen (TYLENOL) tablet 650 mg  Dose: 650 mg  Freq: Every 4 Hours PRN Route: PO  PRN Reason: Mild Pain   Start: 09/26/21 2052    Admin Instructions:   Do not exceed 4 grams of acetaminophen in a 24 hr period. Max dose of 2gm for AST/ALT greater than 120 units/L      If given for pain, use the following pain scale:   Mild Pain = Pain Score of 1-3, CPOT 1-2  Moderate Pain = Pain Score of 4-6, CPOT 3-4  Severe Pain = Pain Score of 7-10, CPOT 5-8           Or  acetaminophen (TYLENOL) 160 MG/5ML solution 650 mg  Dose: 650 mg  Freq: Every 4 Hours PRN Route: PO  PRN Reason: Mild Pain   Start: 09/26/21 2052    Admin Instructions:   Do not exceed 4 grams of acetaminophen in a 24 hr period. Max dose of 2gm for AST/ALT greater than 120 units/L      If given for pain, use the following pain scale:   Mild Pain = Pain Score of 1-3, CPOT 1-2  Moderate Pain = Pain Score of 4-6, CPOT 3-4  Severe Pain = Pain Score of 7-10, CPOT 5-8          Or  acetaminophen (TYLENOL) suppository 650 mg  Dose: 650 mg  Freq: Every 4 Hours PRN Route: RE  PRN Reason: Mild Pain   Start: 09/26/21 2052    Admin Instructions:   Do not exceed 4 grams of acetaminophen in a 24 hr period. Max dose of 2gm for AST/ALT greater than 120 units/L      If given for pain, use the following pain scale:   Mild Pain = Pain Score of 1-3, CPOT 1-2  Moderate Pain = Pain Score of 4-6, CPOT 3-4  Severe Pain = Pain Score of 7-10, CPOT 5-8           apixaban (ELIQUIS) tablet 10 mg  Dose: 10 mg  Freq: Every 12 Hours Scheduled Route: PO  Indications of Use: DVT/PE (active thrombosis)  Start: 10/03/21 2100   End: 10/10/21 2059    Admin Instructions:   Tablet may be crushed and suspended in 60 mL of water or D5W and immediately delivered via NG tube.     0850     2037         0837     2106         0803 2100           Followed by  apixaban (ELIQUIS) tablet 5 mg  Dose: 5 mg  Freq: Every 12 Hours Scheduled Route: PO  Indications of Use: DVT/PE (active thrombosis)  Start: 10/10/21 2100    Admin Instructions:   Tablet may be crushed and suspended in 60 mL of water or D5W and immediately delivered via NG tube.           atorvastatin (LIPITOR) tablet 10 mg  Dose: 10 mg  Freq: Daily Route: PO  Start: 09/27/21 0945    Admin Instructions:   Avoid grapefruit juice.     0850          0837          0803             bisacodyl (DULCOLAX) suppository 10 mg  Dose: 10 mg  Freq: Daily PRN Route: RE  PRN Reason: Constipation  Start: 09/30/21  0945    0135               calamine 8-8 % lotion  Freq: Every 8 Hours PRN Route: TOP  PRN Reason: Itching  Start: 09/27/21 1832    Admin Instructions:   Apply to affected area.           cetirizine (zyrTEC) tablet 10 mg  Dose: 10 mg  Freq: Daily Route: PO  Start: 09/27/21 0900    0851          0837          0804             dextrose (D50W) 25 g/ 50mL Intravenous Solution 25 g  Dose: 25 g  Freq: Every 15 Minutes PRN Route: IV  PRN Reason: Low Blood Sugar  PRN Comment: Blood Sugar Less Than 70  Start: 09/26/21 2052    Admin Instructions:   Blood sugar less than 70; patient has IV access - Unresponsive, NPO or Unable To Safely Swallow           dextrose (GLUTOSE) oral gel 15 g  Dose: 15 g  Freq: Every 15 Minutes PRN Route: PO  PRN Reason: Low Blood Sugar  PRN Comment: Blood sugar less than 70  Start: 09/26/21 2052    Admin Instructions:   BS<70, Patient Alert, Is not NPO, Can safely swallow.           fluticasone (FLONASE) 50 MCG/ACT nasal spray 1 spray  Dose: 1 spray  Freq: Daily Route: NA  Start: 09/27/21 0900    0852          0837          0805             glucagon (human recombinant) (GLUCAGEN DIAGNOSTIC) injection 1 mg  Dose: 1 mg  Freq: Every 15 Minutes PRN Route: SC  PRN Reason: Low Blood Sugar  PRN Comment: Blood Glucose Less Than 70  Start: 09/26/21 2052    Admin Instructions:   Blood Glucose Less Than 70 - Patient Without IV Access - Unresponsive, NPO or Unable To Safely Swallow           icosapent ethyl (VASCEPA) capsule 2 g  Dose: 2 g  Freq: 2 Times Daily With Meals Route: PO  Start: 09/29/21 0800    Admin Instructions:   Swallow capsule whole.  Do not break open, crush, dissolve, or chew capsule.     0852     1735         0836     1728         0804     1800            insulin lispro (ADMELOG) injection 0-9 Units  Dose: 0-9 Units  Freq: 3 Times Daily Before Meals Route: SC  Start: 09/27/21 0730    Admin Instructions:   Correction - Moderate Dose.  40-60 units/day total insulin dose or average weight, on  oral agents    Blood glucose 150-199 mg/dL - 2 units  Blood glucose 200-249 mg/dL - 4 units  Blood glucose 250-299 mg/dL - 6 units  Blood glucose 300-349 mg/dL - 7 units  Blood glucose 350-400 mg/dL - 8 units  Blood glucose greater than 400 mg/dL - 9 units and call provider   Caution: Look alike/sound alike drug alert     0645     (6510)     (6097)        (9543) [C]     (1135) [C]     1650        (9607) [C]     (1055) [C]     1730 [C]           lactated ringers infusion  Rate: 9 mL/hr Dose: 9 mL/hr  Freq: Continuous Route: IV  Start: 09/28/21 1024   End: 10/06/21 1540          levothyroxine (SYNTHROID, LEVOTHROID) tablet 112 mcg  Dose: 112 mcg  Freq: Daily Route: PO  Start: 09/27/21 0900    Admin Instructions:   Take on empty stomach.     0850          0837          0804             montelukast (SINGULAIR) tablet 10 mg  Dose: 10 mg  Freq: Nightly Route: PO  Start: 09/26/21 2145 2038 2106 2100             morphine injection 1 mg  Dose: 1 mg  Freq: Every 4 Hours PRN Route: IV  PRN Reason: Moderate Pain   Start: 09/26/21 2052   End: 10/03/21 2051    Admin Instructions:   If given for pain, use the following pain scale:  Mild Pain = Pain Score of 1-3, CPOT 1-2  Moderate Pain = Pain Score of 4-6, CPOT 3-4  Severe Pain = Pain Score of 7-10, CPOT 5-8          And  naloxone (NARCAN) injection 0.4 mg  Dose: 0.4 mg  Freq: Every 5 Minutes PRN Route: IV  PRN Reason: Respiratory Depression  Start: 09/26/21 2052    Admin Instructions:   If respiratory rate is less than 8 breaths/minute or patient is difficult to arouse stop any narcotics and contact physician.   Administer slow IV push. Repeat as ordered until patient's respiratory rate is greater than 12 breaths/minute.           ondansetron (ZOFRAN) injection 4 mg  Dose: 4 mg  Freq: Every 6 Hours PRN Route: IV  PRN Reasons: Nausea,Vomiting  Start: 09/26/21 2052    Admin Instructions:   If BOTH ondansetron (ZOFRAN) and promethazine (PHENERGAN) are ordered  use ondansetron first and THEN promethazine IF ondansetron is ineffective.           oxyCODONE-acetaminophen (PERCOCET) 5-325 MG per tablet 1 tablet  Dose: 1 tablet  Freq: Every 4 Hours PRN Route: PO  PRN Reason: Moderate Pain   Start: 09/27/21 0757   End: 10/10/21 1049    Admin Instructions:   [AUREA]    Do not exceed 4 grams of acetaminophen in a 24 hr period. Max dose of 2gm for AST/ALT greater than 120 units/L        If given for pain, use the following pain scale:   Mild Pain = Pain Score of 1-3, CPOT 1-2  Moderate Pain = Pain Score of 4-6, CPOT 3-4  Severe Pain = Pain Score of 7-10, CPOT 5-8     0614     2038         0028     0617     1830       2321          0802     1436            pantoprazole (PROTONIX) EC tablet 40 mg  Dose: 40 mg  Freq: Daily Route: PO  Start: 09/27/21 0900    Admin Instructions:   Swallow whole; do not crush, split, or chew.     0850          0839          0804             sodium chloride 0.9 % flush 10 mL  Dose: 10 mL  Freq: As Needed Route: IV  PRN Reason: Line Care  Start: 09/26/21 2052          sodium chloride 0.9 % flush 10 mL  Dose: 10 mL  Freq: Every 12 Hours Scheduled Route: IV  Start: 09/26/21 2145    0852     2039         (0837) [C]              (0805) [C]     2100            sodium chloride 0.9 % flush 10 mL  Dose: 10 mL  Freq: As Needed Route: IV  PRN Reason: Line Care  Start: 09/26/21 1913          vitamin D (ERGOCALCIFEROL) capsule 50,000 Units  Dose: 50,000 Units  Freq: Weekly Route: PO  Start: 09/30/21 0900         Completed Medications  Medications 10/04/21 10/05/21 10/06/21       ceFAZolin in dextrose (ANCEF) IVPB solution 2 g  Dose: 2 g  Freq: Once Route: IV  Indications of Use: PERIOPERATIVE PHARMACOPROPHYLAXIS  Start: 09/28/21 1037   End: 09/28/21 1105    Admin Instructions:   Caution: Look alike/sound alike drug alert           ceFAZolin in dextrose (ANCEF) IVPB solution 2 g  Dose: 2 g  Freq: Every 8 Hours Route: IV  Indications of Use: PERIOPERATIVE  PHARMACOPROPHYLAXIS  Start: 09/28/21 1900   End: 09/29/21 1215    Admin Instructions:   Caution: Look alike/sound alike drug alert           ceFAZolin in dextrose (ANCEF) IVPB solution 2 g  Dose: 2 g  Freq: On Call to O.R. Route: IV  Indications of Use: PERIOPERATIVE PHARMACOPROPHYLAXIS  Start: 09/27/21 0600   End: 09/28/21 1101    Admin Instructions:   Caution: Look alike/sound alike drug alert           famotidine (PEPCID) injection 20 mg  Dose: 20 mg  Freq: Once Route: IV  Start: 09/28/21 1024   End: 09/28/21 1032    Admin Instructions:   Dilute to 10 mL total volume and give IV push over 2 minutes.           HYDROcodone-acetaminophen (NORCO) 5-325 MG per tablet 1 tablet  Dose: 1 tablet  Freq: Once Route: PO  Start: 09/26/21 1835   End: 09/26/21 1942    Admin Instructions:   [AUREA]    Do not exceed 4 grams of acetaminophen in a 24 hr period. Max dose of 2gm for AST/ALT greater than 120 units/L        If given for pain, use the following pain scale:   Mild Pain = Pain Score of 1-3, CPOT 1-2  Moderate Pain = Pain Score of 4-6, CPOT 3-4  Severe Pain = Pain Score of 7-10, CPOT 5-8          Discontinued Medications  Medications 10/04/21 10/05/21 10/06/21       allopurinol (ZYLOPRIM) tablet 200 mg  Dose: 200 mg  Freq: Daily Route: PO  Start: 09/27/21 0900   End: 10/02/21 1452    Admin Instructions:   ([facility]) Take with food if GI upset occurs.           apixaban (ELIQUIS) tablet 10 mg  Dose: 10 mg  Freq: Every 12 Hours Scheduled Route: PO  Indications of Use: DVT/PE (active thrombosis)  Start: 10/03/21 2100   End: 10/03/21 1625    Admin Instructions:   Tablet may be crushed and suspended in 60 mL of water or D5W and immediately delivered via NG tube.          Followed by  apixaban (ELIQUIS) tablet 5 mg  Dose: 5 mg  Freq: Every 12 Hours Scheduled Route: PO  Indications of Use: DVT/PE (active thrombosis)  Start: 10/10/21 2100   End: 10/03/21 1625    Admin Instructions:   Tablet may be crushed and suspended in 60 mL of water  or D5W and immediately delivered via NG tube.           aspirin tablet 325 mg  Dose: 325 mg  Freq: Daily Route: PO  Start: 09/29/21 0900   End: 10/03/21 1625    Admin Instructions:   Do not exceed 4 grams of aspirin in a 24 hr period.    If given for pain, use the following pain scale:   Mild Pain = Pain Score of 1-3, CPOT 1-2  Moderate Pain = Pain Score of 4-6, CPOT 3-4  Severe Pain = Pain Score of 7-10, CPOT 5-8           bupivacaine-EPINEPHrine PF (MARCAINE w/EPI) 0.5% [id number] injection  Freq: As Needed  Start: 09/28/21 1200   End: 09/28/21 1220          diphenhydrAMINE (BENADRYL) capsule 25 mg  Dose: 25 mg  Freq: Every 30 Minutes PRN Route: PO  PRN Reason: Itching  PRN Comment: May repeat x 1  Indications of Use: EXTRAPYRAMIDAL REACTION,PRURITUS  Start: 09/28/21 1208   End: 09/28/21 1350    Admin Instructions:   Caution: Look alike/sound alike drug alert. This med may be ordered in other forms and routes. Before giving verify the last time the drug was given by any route/form.             diphenhydrAMINE (BENADRYL) injection 12.5 mg  Dose: 12.5 mg  Freq: Every 15 Minutes PRN Route: IV  PRN Reason: Itching  PRN Comment: May repeat x 1  Start: 09/28/21 1208   End: 09/28/21 1350    Admin Instructions:   Caution: Look alike/sound alike drug alert. This med may be ordered in other forms and routes. Before giving verify the last time the drug was given by any route/form.             ePHEDrine injection 5 mg  Dose: 5 mg  Freq: Once As Needed Route: IV  PRN Comment: symptomatic hypotension - Notify attending anesthesiologist if this needs to be given  Start: 09/28/21 1222   End: 09/28/21 1350    Admin Instructions:   Caution: Look alike/sound alike drug alert   Dilute with NS to 5-10 mg/mL.  Central line preferred, if unavailable use large bore IV access with frequent nurse monitoring of IV site.           ePHEDrine injection 5 mg  Dose: 5 mg  Freq: Once As Needed Route: IV  PRN Comment: symptomatic hypotension -  Notify attending anesthesiologist if this needs to be given  Start: 09/28/21 1208   End: 09/28/21 1350    Admin Instructions:   Caution: Look alike/sound alike drug alert   Dilute with NS to 5-10 mg/mL.  Central line preferred, if unavailable use large bore IV access with frequent nurse monitoring of IV site.           fentaNYL citrate (PF) (SUBLIMAZE) injection 50 mcg  Dose: 50 mcg  Freq: Every 5 Minutes PRN Route: IV  PRN Reasons: Moderate Pain ,Severe Pain   Start: 09/28/21 1222   End: 09/28/21 1350    Admin Instructions:   May alternate fentanyl with hydromorphone using fentanyl first.    Maximum total dose of fentanyl is 200 mcg.  If given for pain, use the following pain scale:  Mild Pain = Pain Score of 1-3, CPOT 1-2  Moderate Pain = Pain Score of 4-6, CPOT 3-4  Severe Pain = Pain Score of 7-10, CPOT 5-8           fentaNYL citrate (PF) (SUBLIMAZE) injection 50 mcg  Dose: 50 mcg  Freq: Every 10 Minutes PRN Route: IV  PRN Reason: Severe Pain   Start: 09/28/21 1022   End: 09/28/21 1221    Admin Instructions:   Maximum total dose of fentanyl is 100 mcg.  If given for pain, use the following pain scale:  Mild Pain = Pain Score of 1-3, CPOT 1-2  Moderate Pain = Pain Score of 4-6, CPOT 3-4  Severe Pain = Pain Score of 7-10, CPOT 5-8           fentaNYL citrate (PF) (SUBLIMAZE) injection 50 mcg  Dose: 50 mcg  Freq: Every 5 Minutes PRN Route: IV  PRN Reasons: Moderate Pain ,Severe Pain   Start: 09/28/21 1208   End: 09/28/21 1350    Admin Instructions:   May alternate fentanyl with hydromorphone using fentanyl first.    Maximum total dose of fentanyl is 200 mcg.  If given for pain, use the following pain scale:  Mild Pain = Pain Score of 1-3, CPOT 1-2  Moderate Pain = Pain Score of 4-6, CPOT 3-4  Severe Pain = Pain Score of 7-10, CPOT 5-8           flumazenil (ROMAZICON) injection 0.2 mg  Dose: 0.2 mg  Freq: As Needed Route: IV  PRN Comment: for benzodiazepine induced unresponsiveness or sedation  Indications of Use:  BENZODIAZEPINE-INDUCED SEDATION  Start: 09/28/21 1208   End: 09/28/21 1350    Admin Instructions:   Notify Anesthesia if given  ** give IV over 15-30 seconds **           hydrALAZINE (APRESOLINE) injection 5 mg  Dose: 5 mg  Freq: Every 10 Minutes PRN Route: IV  PRN Reason: High Blood Pressure  PRN Comment: for systolic blood pressure greater than 180 mmHg or diastolic blood pressure greater than 105 mmHg  Start: 09/28/21 1208   End: 09/28/21 1350    Admin Instructions:   Up to 20 mg.  Caution: Look alike/sound alike drug alert           HYDROcodone-acetaminophen (NORCO) 5-325 MG per tablet 1 tablet  Dose: 1 tablet  Freq: Every 4 Hours PRN Route: PO  PRN Reason: Moderate Pain   Start: 09/26/21 2053   End: 09/27/21 0757    Admin Instructions:   [AUREA]    Do not exceed 4 grams of acetaminophen in a 24 hr period. Max dose of 2gm for AST/ALT greater than 120 units/L        If given for pain, use the following pain scale:   Mild Pain = Pain Score of 1-3, CPOT 1-2  Moderate Pain = Pain Score of 4-6, CPOT 3-4  Severe Pain = Pain Score of 7-10, CPOT 5-8           HYDROcodone-acetaminophen (NORCO) 7.5-325 MG per tablet 1 tablet  Dose: 1 tablet  Freq: Once As Needed Route: PO  PRN Reason: Moderate Pain   Start: 09/28/21 1222   End: 09/28/21 1350    Admin Instructions:   [AUREA]    Do not exceed 4 grams of acetaminophen in a 24 hr period. Max dose of 2gm for AST/ALT greater than 120 units/L        If given for pain, use the following pain scale:   Mild Pain = Pain Score of 1-3, CPOT 1-2  Moderate Pain = Pain Score of 4-6, CPOT 3-4  Severe Pain = Pain Score of 7-10, CPOT 5-8           HYDROmorphone (DILAUDID) injection 0.5 mg  Dose: 0.5 mg  Freq: Every 5 Minutes PRN Route: IV  PRN Reasons: Moderate Pain ,Severe Pain   Start: 09/28/21 1222   End: 09/28/21 1350    Admin Instructions:   May alternate fentanyl with hydromorphone using fentanyl first.    Maximum total dose of hydromorphone is 2 mg.  If given for pain, use the following  pain scale:  Mild Pain = Pain Score of 1-3, CPOT 1-2  Moderate Pain = Pain Score of 4-6, CPOT 3-4  Severe Pain = Pain Score of 7-10, CPOT 5-8           ibuprofen (ADVIL,MOTRIN) tablet 600 mg  Dose: 600 mg  Freq: Once As Needed Route: PO  PRN Reason: Mild Pain   Start: 09/28/21 1208   End: 09/28/21 1350    Admin Instructions:   If given for pain, use the following pain scale:  Mild Pain = Pain Score of 1-3, CPOT 1-2  Moderate Pain = Pain Score of 4-6, CPOT 3-4  Severe Pain = Pain Score of 7-10, CPOT 5-8           icosapent ethyl (VASCEPA) capsule 2 g  Dose: 2 g  Freq: 2 Times Daily With Meals Route: PO  Start: 09/26/21 2145   End: 09/28/21 1756    Admin Instructions:   Swallow capsule whole.  Do not break open, crush, dissolve, or chew capsule.           labetalol (NORMODYNE,TRANDATE) injection 5 mg  Dose: 5 mg  Freq: Every 5 Minutes PRN Route: IV  PRN Reason: High Blood Pressure  PRN Comment: for systolic blood pressure greater than 180 mmHg or diastolic blood pressure greater than 105 mmHg  Start: 09/28/21 1208   End: 09/28/21 1350    Admin Instructions:   Hold for heart rate less than 60.  Give by slow IV Push each 20mg (or less) over 2 minutes           levothyroxine (SYNTHROID, LEVOTHROID) tablet 112 mcg  Dose: 112 mcg  Freq: Every Early Morning Route: PO  Start: 09/27/21 0600   End: 09/26/21 2113    Admin Instructions:   Take on empty stomach.           lidocaine PF 1% (XYLOCAINE) injection 0.5 mL  Dose: 0.5 mL  Freq: Once As Needed Route: IJ  PRN Comment: IV Start  Start: 09/28/21 1022   End: 09/28/21 1221          midazolam (VERSED) injection 1 mg  Dose: 1 mg  Freq: Every 10 Minutes PRN Route: IV  PRN Comment: Anxiety prophylaxis, Pre-op comfort  Start: 09/28/21 1022   End: 09/28/21 1221    Admin Instructions:   May repeat dose in 10 minutes one time then contact provider for additional orders.             ondansetron (ZOFRAN) injection 4 mg  Dose: 4 mg  Freq: Once As Needed Route: IV  PRN Reasons:  Nausea,Vomiting  Indications of Use: POSTOPERATIVE NAUSEA AND VOMITING  Start: 09/28/21 1208   End: 09/28/21 1350    Admin Instructions:   If BOTH ondansetron (ZOFRAN) and promethazine (PHENERGAN) are ordered use ondansetron first and THEN promethazine IF ondansetron is ineffective.           oxyCODONE-acetaminophen (PERCOCET)  MG per tablet 1 tablet  Dose: 1 tablet  Freq: Every 4 Hours PRN Route: PO  PRN Reason: Severe Pain   Start: 09/28/21 1222   End: 09/28/21 1350    Admin Instructions:   [AUREA]    Do not exceed 4 grams of acetaminophen in a 24 hr period. Max dose of 2gm for AST/ALT greater than 120 units/L        If given for pain, use the following pain scale:   Mild Pain = Pain Score of 1-3, CPOT 1-2  Moderate Pain = Pain Score of 4-6, CPOT 3-4  Severe Pain = Pain Score of 7-10, CPOT 5-8           oxyCODONE-acetaminophen (PERCOCET) 5-325 MG per tablet 2 tablet  Dose: 2 tablet  Freq: Every 4 Hours PRN Route: PO  PRN Reason: Severe Pain   Start: 09/27/21 0757   End: 10/03/21 1051    Admin Instructions:   [AUREA]    Do not exceed 4 grams of acetaminophen in a 24 hr period. Max dose of 2gm for AST/ALT greater than 120 units/L        If given for pain, use the following pain scale:   Mild Pain = Pain Score of 1-3, CPOT 1-2  Moderate Pain = Pain Score of 4-6, CPOT 3-4  Severe Pain = Pain Score of 7-10, CPOT 5-8           polyethylene glycol (MIRALAX) packet 17 g  Dose: 17 g  Freq: 2 Times Daily Route: PO  Start: 10/02/21 2100   End: 10/06/21 1110    Admin Instructions:   Use 4-8 ounces of water, tea, or juice for each 17 gram dose.     0850     (2040)         (0836)     (2107)         (0803)             polyethylene glycol (MIRALAX) packet 17 g  Dose: 17 g  Freq: Daily Route: PO  Start: 09/30/21 1030   End: 10/02/21 1452    Admin Instructions:   Use 4-8 ounces of water, tea, or juice for each 17 gram dose.           promethazine (PHENERGAN) suppository 25 mg  Dose: 25 mg  Freq: Once As Needed Route: RE  PRN  Reasons: Nausea,Vomiting  Start: 09/28/21 1222   End: 09/28/21 1350    Admin Instructions:   If BOTH ondansetron (ZOFRAN) and promethazine (PHENERGAN) are ordered use ondansetron first and THEN promethazine IF ondansetron is ineffective.          Or  promethazine (PHENERGAN) tablet 25 mg  Dose: 25 mg  Freq: Once As Needed Route: PO  PRN Reasons: Nausea,Vomiting  Start: 09/28/21 1222   End: 09/28/21 1350    Admin Instructions:   If BOTH ondansetron (ZOFRAN) and promethazine (PHENERGAN) are ordered use ondansetron first and THEN promethazine IF ondansetron is ineffective.             promethazine (PHENERGAN) suppository 25 mg  Dose: 25 mg  Freq: Once As Needed Route: RE  PRN Reasons: Nausea,Vomiting  Start: 09/28/21 1208   End: 09/28/21 1350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

## 2021-10-07 ENCOUNTER — NURSE TRIAGE (OUTPATIENT)
Dept: CALL CENTER | Facility: HOSPITAL | Age: 61
End: 2021-10-07

## 2021-10-07 ENCOUNTER — READMISSION MANAGEMENT (OUTPATIENT)
Dept: CALL CENTER | Facility: HOSPITAL | Age: 61
End: 2021-10-07

## 2021-10-07 VITALS
DIASTOLIC BLOOD PRESSURE: 68 MMHG | RESPIRATION RATE: 16 BRPM | HEART RATE: 98 BPM | SYSTOLIC BLOOD PRESSURE: 132 MMHG | BODY MASS INDEX: 27.85 KG/M2 | TEMPERATURE: 97.1 F | WEIGHT: 188 LBS | OXYGEN SATURATION: 95 % | HEIGHT: 69 IN

## 2021-10-07 LAB
ALBUMIN SERPL-MCNC: 3.8 G/DL (ref 3.5–5.2)
ANION GAP SERPL CALCULATED.3IONS-SCNC: 10.8 MMOL/L (ref 5–15)
BASOPHILS # BLD AUTO: 0.05 10*3/MM3 (ref 0–0.2)
BASOPHILS NFR BLD AUTO: 0.7 % (ref 0–1.5)
BUN SERPL-MCNC: 8 MG/DL (ref 8–23)
BUN/CREAT SERPL: 11.8 (ref 7–25)
CALCIUM SPEC-SCNC: 9.5 MG/DL (ref 8.6–10.5)
CHLORIDE SERPL-SCNC: 103 MMOL/L (ref 98–107)
CO2 SERPL-SCNC: 25.2 MMOL/L (ref 22–29)
CREAT SERPL-MCNC: 0.68 MG/DL (ref 0.57–1)
DEPRECATED RDW RBC AUTO: 44.8 FL (ref 37–54)
EOSINOPHIL # BLD AUTO: 0.28 10*3/MM3 (ref 0–0.4)
EOSINOPHIL NFR BLD AUTO: 3.7 % (ref 0.3–6.2)
ERYTHROCYTE [DISTWIDTH] IN BLOOD BY AUTOMATED COUNT: 14.1 % (ref 12.3–15.4)
GFR SERPL CREATININE-BSD FRML MDRD: 88 ML/MIN/1.73
GLUCOSE BLDC GLUCOMTR-MCNC: 132 MG/DL (ref 70–130)
GLUCOSE BLDC GLUCOMTR-MCNC: 132 MG/DL (ref 70–130)
GLUCOSE BLDC GLUCOMTR-MCNC: 149 MG/DL (ref 70–130)
GLUCOSE SERPL-MCNC: 125 MG/DL (ref 65–99)
HCT VFR BLD AUTO: 32.2 % (ref 34–46.6)
HGB BLD-MCNC: 10.8 G/DL (ref 12–15.9)
IMM GRANULOCYTES # BLD AUTO: 0.05 10*3/MM3 (ref 0–0.05)
IMM GRANULOCYTES NFR BLD AUTO: 0.7 % (ref 0–0.5)
LYMPHOCYTES # BLD AUTO: 2.34 10*3/MM3 (ref 0.7–3.1)
LYMPHOCYTES NFR BLD AUTO: 31.2 % (ref 19.6–45.3)
MAGNESIUM SERPL-MCNC: 2 MG/DL (ref 1.6–2.4)
MCH RBC QN AUTO: 29.1 PG (ref 26.6–33)
MCHC RBC AUTO-ENTMCNC: 33.5 G/DL (ref 31.5–35.7)
MCV RBC AUTO: 86.8 FL (ref 79–97)
MONOCYTES # BLD AUTO: 0.76 10*3/MM3 (ref 0.1–0.9)
MONOCYTES NFR BLD AUTO: 10.1 % (ref 5–12)
NEUTROPHILS NFR BLD AUTO: 4.01 10*3/MM3 (ref 1.7–7)
NEUTROPHILS NFR BLD AUTO: 53.6 % (ref 42.7–76)
NRBC BLD AUTO-RTO: 0 /100 WBC (ref 0–0.2)
PHOSPHATE SERPL-MCNC: 4.6 MG/DL (ref 2.5–4.5)
PLATELET # BLD AUTO: 312 10*3/MM3 (ref 140–450)
PMV BLD AUTO: 10.1 FL (ref 6–12)
POTASSIUM SERPL-SCNC: 3.9 MMOL/L (ref 3.5–5.2)
RBC # BLD AUTO: 3.71 10*6/MM3 (ref 3.77–5.28)
SODIUM SERPL-SCNC: 139 MMOL/L (ref 136–145)
WBC # BLD AUTO: 7.49 10*3/MM3 (ref 3.4–10.8)

## 2021-10-07 PROCEDURE — 85025 COMPLETE CBC W/AUTO DIFF WBC: CPT | Performed by: HOSPITALIST

## 2021-10-07 PROCEDURE — 97110 THERAPEUTIC EXERCISES: CPT

## 2021-10-07 PROCEDURE — 83735 ASSAY OF MAGNESIUM: CPT | Performed by: HOSPITALIST

## 2021-10-07 PROCEDURE — 80069 RENAL FUNCTION PANEL: CPT | Performed by: HOSPITALIST

## 2021-10-07 PROCEDURE — 82962 GLUCOSE BLOOD TEST: CPT

## 2021-10-07 RX ORDER — OXYCODONE HYDROCHLORIDE AND ACETAMINOPHEN 5; 325 MG/1; MG/1
1 TABLET ORAL EVERY 4 HOURS PRN
Qty: 20 TABLET | Refills: 0 | Status: SHIPPED | OUTPATIENT
Start: 2021-10-07 | End: 2021-10-10

## 2021-10-07 RX ADMIN — OXYCODONE AND ACETAMINOPHEN 1 TABLET: 5; 325 TABLET ORAL at 03:01

## 2021-10-07 RX ADMIN — PANTOPRAZOLE SODIUM 40 MG: 40 TABLET, DELAYED RELEASE ORAL at 08:34

## 2021-10-07 RX ADMIN — CETIRIZINE HYDROCHLORIDE 10 MG: 10 TABLET ORAL at 08:33

## 2021-10-07 RX ADMIN — ERGOCALCIFEROL 50000 UNITS: 1.25 CAPSULE ORAL at 08:33

## 2021-10-07 RX ADMIN — APIXABAN 10 MG: 5 TABLET, FILM COATED ORAL at 08:33

## 2021-10-07 RX ADMIN — ATORVASTATIN CALCIUM 10 MG: 20 TABLET, FILM COATED ORAL at 08:34

## 2021-10-07 RX ADMIN — ICOSAPENT ETHYL 2 G: 1000 CAPSULE ORAL at 08:34

## 2021-10-07 RX ADMIN — LEVOTHYROXINE SODIUM 112 MCG: 0.11 TABLET ORAL at 08:34

## 2021-10-07 RX ADMIN — FLUTICASONE PROPIONATE 1 SPRAY: 50 SPRAY, METERED NASAL at 08:35

## 2021-10-07 NOTE — PLAN OF CARE
Goal Outcome Evaluation:  Plan of Care Reviewed With: patient        Progress: improving  Outcome Summary: 59 yo female POD 10 R Tibial ORIF. vss. Dressing CDI. NVI. Pt on room air. pain controlled with prn po pain meds. RLE immobilizer in place. voiding well. Educated patient on skin integrity and importance of frequent position changes. Plans for d/c today (10/7). will continue to monitor.

## 2021-10-07 NOTE — THERAPY TREATMENT NOTE
Patient Name: Shanika Velez  : 1960    MRN: [id number]                              Today's Date: 10/7/2021       Admit Date: 2021    Visit Dx:     ICD-10-CM ICD-9-CM   1. Closed fracture of right tibial plateau, initial encounter  S82.141A 823.00   2. Tibial plateau fracture, right, closed, initial encounter  S82.141A 823.00   3. Vitamin D deficiency  E55.9 268.9   4. Acquired hypothyroidism  E03.9 244.9     Patient Active Problem List   Diagnosis   • Dyslipidemia   • Hypothyroidism   • Type 2 diabetes mellitus (HCC)   • Vitamin D deficiency   • Atopic rhinitis   • Asthma   • Osteoarthritis   • GERD (gastroesophageal reflux disease)   • Diverticulosis   • Hyperlipidemia   • Inflammatory bowel disease   • Tibial plateau fracture, right, closed, initial encounter     Past Medical History:   Diagnosis Date   • Dyslipidemia    • Hyperlipidemia    • Hypothyroidism    • Pneumonia    • PONV (postoperative nausea and vomiting)    • Type 2 diabetes mellitus (HCC)    • Vitamin D deficiency      Past Surgical History:   Procedure Laterality Date   • APPENDECTOMY     • CHOLECYSTECTOMY  UNKNOWN   • TIBIAL PLATEAU OPEN REDUCTION INTERNAL FIXATION Right 2021    Procedure: TIBIAL PLATEAU OPEN REDUCTION INTERNAL FIXATION;  Surgeon: Eitan Tomlinson II, MD;  Location: [facility];  Service: Orthopedics;  Laterality: Right;     General Information     Row Name 10/07/21 1538          Physical Therapy Time and Intention    Document Type  therapy note (daily note)  -     Mode of Treatment  physical therapy  -     Row Name 10/07/21 1538          General Information    Existing Precautions/Restrictions  fall;brace worn when out of bed;non-weight bearing KI on R LE  -     Row Name 10/07/21 1538          Cognition    Orientation Status (Cognition)  oriented x 4  -       User Key  (r) = Recorded By, (t) = Taken By, (c) = Cosigned By    Initials Name Provider Type     Janeth Warren PTA Physical  Therapy Assistant        Mobility     Row Name 10/07/21 1538          Bed Mobility    Comment (Bed Mobility)  up in chair  -     Row Name 10/07/21 1538          Sit-Stand Transfer    Sit-Stand Moody (Transfers)  modified independence  -     Assistive Device (Sit-Stand Transfers)  walker, standard  -     Row Name 10/07/21 1538          Gait/Stairs (Locomotion)    Moody Level (Gait)  supervision  -     Assistive Device (Gait)  walker, standard  -     Distance in Feet (Gait)  80ft x2  -SM     Deviations/Abnormal Patterns (Gait)  gretchen decreased;stride length decreased  -     Bilateral Gait Deviations  forward flexed posture  -     Right Sided Gait Deviations  weight shift ability decreased NWB  -     Comment (Gait/Stairs)  seated rest break d/t UE fatigue  -       User Key  (r) = Recorded By, (t) = Taken By, (c) = Cosigned By    Initials Name Provider Type    Janeth Norman PTA Physical Therapy Assistant        Obj/Interventions     Row Name 10/07/21 1540          Motor Skills    Therapeutic Exercise  -- seated AP, QS, GS, hip abd/add, SLR x30 reps  -       User Key  (r) = Recorded By, (t) = Taken By, (c) = Cosigned By    Initials Name Provider Type    Janeth Norman PTA Physical Therapy Assistant        Goals/Plan    No documentation.       Clinical Impression     Row Name 10/07/21 1540          Pain    Additional Documentation  Pain Scale: Numbers Pre/Post-Treatment (Group)  -[facility] [place] Name 10/07/21 1546          Pain Scale: Numbers Pre/Post-Treatment    Pretreatment Pain Rating  2/10  -     Posttreatment Pain Rating  2/10  -     Pain Location - Side  Right  -     Pain Location  knee  -     Pain Intervention(s)  Repositioned;Ambulation/increased activity;Rest  -     Row Name 10/07/21 1543          Positioning and Restraints    Pre-Treatment Position  sitting in chair/recliner  -     Post Treatment Position  chair  -SM     In Chair  reclined;call light  within reach;encouraged to call for assist;exit alarm on  -       User Key  (r) = Recorded By, (t) = Taken By, (c) = Cosigned By    Initials Name Provider Type    Janeth Norman PTA Physical Therapy Assistant        Outcome Measures     Row Name 10/07/21 1541          How much help from another person do you currently need...    Turning from your back to your side while in flat bed without using bedrails?  4  -SM     Moving from lying on back to sitting on the side of a flat bed without bedrails?  4  -SM     Moving to and from a bed to a chair (including a wheelchair)?  4  -SM     Standing up from a chair using your arms (e.g., wheelchair, bedside chair)?  4  -SM     Climbing 3-5 steps with a railing?  3  -SM     To walk in hospital room?  4  -     AM-PAC 6 Clicks Score (PT)  23  -     Row Name 10/07/21 1541          Functional Assessment    Outcome Measure Options  AM-PAC 6 Clicks Basic Mobility (PT)  -       User Key  (r) = Recorded By, (t) = Taken By, (c) = Cosigned By    Initials Name Provider Type    Janeth Norman PTA Physical Therapy Assistant                       Physical Therapy Education                 Title: PT OT SLP Therapies (Done)     Topic: Physical Therapy (Done)     Point: Mobility training (Done)     Learning Progress Summary           Patient Acceptance, E,TB,D, VU by  at 10/7/2021 1541    Acceptance, E,TB,D, VU,NR by  at 10/5/2021 1450    Acceptance, E,TB,D, VU by  at 10/4/2021 1119    Acceptance, E,TB, VU by CHRISTOFER at 10/3/2021 1258    Acceptance, TB,E, VU by  at 10/2/2021 1325    Acceptance, E,TB,D, VU,NR by  at 10/1/2021 1527    Acceptance, E,TB,D, VU,NR by  at 9/30/2021 1607    Acceptance, E,D, DU by  at 9/29/2021 1200                   Point: Home exercise program (Done)     Learning Progress Summary           Patient Acceptance, E,TB,D, VU by  at 10/7/2021 1541    Acceptance, E,TB,D, VU,NR by  at 10/5/2021 1450    Acceptance, E,TB,SANDRINE, VU by  at  10/4/2021 1119    Acceptance, E,TB, VU by  at 10/3/2021 1258    Acceptance, TB,E, VU by  at 10/2/2021 1325    Acceptance, E,TB,D, VU,NR by  at 10/1/2021 1527    Acceptance, E,TB,D, VU,NR by  at 9/30/2021 1607    Acceptance, E,D, DU by  at 9/29/2021 1200                   Point: Body mechanics (Done)     Learning Progress Summary           Patient Acceptance, E,TB,D, VU by  at 10/7/2021 1541    Acceptance, E,TB,D, VU,NR by  at 10/5/2021 1450    Acceptance, E,TB,D, VU by  at 10/4/2021 1119    Acceptance, TB,E, VU by  at 10/2/2021 1325    Acceptance, E,TB,D, VU,NR by  at 10/1/2021 1527    Acceptance, E,TB,D, VU,NR by  at 9/30/2021 1607    Acceptance, E,D, DU by  at 9/29/2021 1200                   Point: Precautions (Done)     Learning Progress Summary           Patient Acceptance, E,TB,D, VU by  at 10/7/2021 1541    Acceptance, E,TB,D, VU,NR by  at 10/5/2021 1450    Acceptance, E,TB,D, VU by  at 10/4/2021 1119    Acceptance, E,TB, VU by  at 10/3/2021 1258    Acceptance, TB,E, VU by  at 10/2/2021 1325    Acceptance, E,TB,D, VU,NR by  at 10/1/2021 1527    Acceptance, E,TB,D, VU,NR by  at 9/30/2021 1607    Acceptance, E,D, DU by  at 9/29/2021 1200                               User Key     Initials Effective Dates Name Provider Type Discipline     06/16/21 -  Hussein Sin, PT Physical Therapist PT    PC 06/16/21 -  Kellee Phan, PT Physical Therapist PT     03/07/18 -  Janeth Warren, PTA Physical Therapy Assistant PT     05/19/21 -  Kristy Mcmillan, PT Physical Therapist PT              PT Recommendation and Plan     Plan of Care Reviewed With: patient  Progress: improving  Outcome Summary: Pt tolerated treatment well this date. Ambulated 80ft x2 w/ walker and SV. Occasional standing rest break needed, then seated rest break taken [facility] d/t UE fatigue. Instructed pt through LE exercises, and encouraged her to ambulate more this evening.     Time  Calculation:   PT Charges     Row Name 10/07/21 1543             Time Calculation    Start Time  0945  -      Stop Time  1020  -      Time Calculation (min)  35 min  -      PT Received On  10/07/21  -      PT - Next Appointment  10/08/21  -        User Key  (r) = Recorded By, (t) = Taken By, (c) = Cosigned By    Initials Name Provider Type     Janeth Warren PTA Physical Therapy Assistant        Therapy Charges for Today     Code Description Service Date Service Provider Modifiers Qty    11204406552 HC PT THER PROC EA 15 MIN 10/7/2021 Janeth Warren PTA GP 2          PT G-Codes  Outcome Measure Options: AM-PAC 6 Clicks Basic Mobility (PT)  AM-PAC 6 Clicks Score (PT): 23    Janeth Warren PTA  10/7/2021
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
## 2021-10-07 NOTE — DISCHARGE INSTRUCTIONS
Proximal Tibia/Distal Femur ORIF Discharge Instructions  Dr. DAMION Van” Davi II  [phone]    • INCISION CARE  o Wash your hands prior to dressing changes  o The postoperative dressings should be changed starting on postoperative day #2. This will be started by nursing in the hospital if you have not yet been discharged. New dry dressings can then be changed daily.   o No creams or ointments to the incisions until 4 weeks post op.  o May remove dressing once the incision is completely free of drainage. But need to wait at least 2 weeks after surgery.  o Do not touch or pick at the incision  o Check incision every day and notify surgeon immediately if any of the following signs or symptoms are seen:  - Increase in redness  - Increase in swelling around the incision and of the entire extremity  - Increase in pain  - NEW drainage or oozing from the incision  - Pulling apart of the edges of the incision  - Increase in overall body temperature (greater than 100.4 degrees)  o Your surgeon will instruct you regarding suture or staple removal. In general, this happens at 2-week post op. If you are discharged to an SNF/SNU facility, they can and should remove your staples at 14 days.     • ACTIVITIES  o Exercises:  - Physical therapy may not be needed immediately after surgery. Once bone healing has occurred, and weight bearing is permitted, it will be possible to start therapy to regain strength and mobility. If you are discharged to an SNF/SNU facility, they will work with you on safe ambulation.   - Elevate the affected leg most of the day during the first week post operatively.   - Use cold packs for 20-30 minutes approximately 5 times per day.  - Knee motion may or may not be permitted immediately following surgery. You will have to discuss this with your surgeon for specific instructions. Additionally depending on the severity of the injury you may have a brace on that locks the knee in full extension and does not  permit bending.   o Activities of Daily Living:   - Showering may begin at 2 weeks if the wounds are completely dry and not draining. If there is any concern of wound healing, showering is not allowed. A new dry dressing can be applied after showering.  - No tub baths, hot tubs, or swimming pools for 4 weeks.    • Restrictions  o Weight: Do not put weight on the leg until instructed to do so. Your surgeon will discuss with restrictions in terms of activities allowed. In general it takes 6-12 weeks before any weight will be allowed on the leg. You will have to discuss your specific restrictions with your surgeon.   o Driving: Many patients have questions about when it is safe to return to driving. The answer is that this is extremely variable. It depends on the extent of the surgery, as well as how quickly you heal. Certainly left leg surgeries make returning to driving easier while right leg surgeries require more extensive rehabilitation before driving can be safe. Until you can press down on the brake hard, and are off of all narcotics, driving is not permitted. Your surgeon cannot “clear” you to return to driving, only you can make the decision when you feel it is safe.    • Medications  o Anticoagulants: After upper extremity surgery most patients do not require an anticoagulant unless you have another injury that will be keeping you from mobilizing. Lower extremity surgery typically does require use of an anticoagulation medicine.   - IF YOU HAD LOWER EXTREMITY SURGERY AND ARE NOT DISCHARGED HOME WITH ANY ANTICOAGULANT MEDICINE YOU SHOULD TAKE ASPIRIN 325mg DAILY FOR 30 DAYS POSTOPERATIVELY.  - If you are discharged home with an anticoagulant such as Aspirin, Xarelto, Eliquis, Coumadin, or Lovenox, follow these simple instructions:   - Notify surgeon immediately if any ladarius bleeding is noted in the urine, stool, emesis, or from the nose or the incision. Blood in the stool will often appear as black rather than  red. Blood in urine may appear as pink. Blood in emesis may appear as brown/black like coffee grounds.  - You will need to apply pressure for longer periods of time to any cuts or abrasions to stop bleeding  - Avoid alcohol while taking anticoagulants  - Most anticoagulants are to be taken for 30 days postoperatively. After this time, you may stop using them unless instructed otherwise.   - If you were already taking an anticoagulant (commonly Aspirin, Coumadin, or Plavix) you will likely be resuming your normal dose postoperatively and will be continuing that medication at the discretion of the prescribing physician.  o Stool Softeners: You will be at greater risk of constipation after surgery due to being less mobile and the pain medications.  - Take stool softeners as needed. Over the counter Colace 100 mg 1-2 capsules twice daily can be taken.  - If stools become too loose or too frequent, please decreases the dosage or stop the stool softener.  - If constipation occurs despite use of stool softeners, you are to continue the stool softeners and add a laxative (Milk of Magnesia 1 ounce daily as needed)  - Drink plenty of fluids, and eat fruits and vegetables during your recovery time. Getting up and mobilizing will help the bowels to recover their regular function, as will weaning off of all narcotics when the pain becomes tolerable.  o Pain Medications utilized after surgery are narcotics. This is some general information about these medications.  - CLASSIFICATION: Pain medications are called Opioids and are narcotics  - LEGALITIES: It is illegal to share narcotics with others  - DRIVING: it is illegal to drive while under the influence of narcotics. Doing so is a DUI.  - POTENTIAL SIDE EFFECTS: nausea, vomiting, itching, dizziness, drowsiness, dry mouth, constipation, and difficulty urinating.  - POTENTIAL ADVERSE EFFECTS:  - Opioid tolerance can develop with use of pain medications and this simply means that  it requires more and more of the medication to control pain. However, this is seen more in patients that use opioids for longer periods of time.  - Opioid dependence can develop with use of Opioids. People with opioid dependence will experience withdrawal symptoms upon cessation of the medication.  - Opioid addiction can develop with use of Opioids. The incidence of this is very unlikely in patients who take the medications as ordered and stop the medications as instructed.  - Opioid overdose can be dangerous, but is unlikely when the medication is taken as ordered and stopped when ordered. It is important not to mix opioids with alcohol as this can lead to over sedation and respiratory difficulty.  - DOSAGE:  - After the initial surgical pain begins to resolve, you may begin to decrease the pain medication. By the end of a few weeks, you should be off of pain medications.  - Refills will not be given by the office during evening hours, on weekends, or after 6 weeks post-op. You are responsible for weaning off of pain medication. You can increase the time between narcotic pills, taking one every 4 then 6 then 8 hours and so on.  - To seek refills on pain medications during the initial 6-week post-operative period, you must call the office to request the refill. The office will then notify you when to  the prescription. DO NOT wait until you are out of the medication to request a refill. Prescriptions will not be filled over the weekend and depending on the schedule, it may take a couple days for the prescription to be available. Someone will have to pick the prescription in person at the office.    • FOLLOW-UP VISITS  o You will need to follow up in the office with your surgeon in 2 weeks, or as instructed elsewhere in your discharge paperwork. Please call this number [phone] to schedule this appointment. If you are going to an SNF facility, they will arrange for you to follow up in the office.   o If  you have any concerns or suspected complications prior to your follow up visit, please call the office. Do not wait until your appointment time if you suspect complications. These will need to be addressed in the office promptly.      Eitan Tomlinson II, MD  Orthopaedic Surgery  [facility]

## 2021-10-08 NOTE — OUTREACH NOTE
Prep Survey      Responses   [facility] facility patient discharged from?  [place]   Is LACE score < 7 ?  No   Emergency Room discharge w/ pulse ox?  No   Eligibility  Readm Mgmt   Discharge diagnosis  Tibial plateau fracture, right, closed, initial encounter    Does the patient have one of the following disease processes/diagnoses(primary or secondary)?  General Surgery   Does the patient have Home health ordered?  Yes   What is the Home health agency?   [facility]    Is there a DME ordered?  No   Prep survey completed?  Yes          Miesha Pantoja RN

## 2021-10-08 NOTE — TELEPHONE ENCOUNTER
Caller states her Glucophage is not on her AVS. Caller unsure about Eliquis last given time. Caller did speak with Brittni from [address] and she states ok to take 10 mg Eliquis tonight and call PCP in the morning. Advised to call back as needed.         Reason for Disposition  • [1] Caller has NON-URGENT medicine question about med that PCP prescribed AND [2] triager unable to answer question    Additional Information  • Negative: [1] Intentional drug overdose AND [2] suicidal thoughts or ideas  • Negative: Drug overdose and triager unable to answer question  • Negative: Caller requesting information unrelated to medicine  • Negative: Caller requesting information about COVID-19 Vaccine  • Negative: Caller requesting information about Emergency Contraception  • Negative: Caller requesting information about Combined Birth Control Pills  • Negative: Caller requesting information about Progestin Birth Control Pills  • Negative: Caller requesting information about Post-Op pain or medicines  • Negative: Caller requesting a prescription antibiotic (such as Penicillin) for Strep throat and has a positive culture result  • Negative: Caller requesting a prescription anti-viral med (such as Tamiflu) and has influenza (flu)  symptoms  • Negative: Immunization reaction suspected  • Negative: Rash while taking a medicine or within 3 days of stopping it  • Negative: [1] Asthma and [2] having symptoms of asthma (cough, wheezing, etc.)  • Negative: [1] Symptom of illness (e.g., headache, abdominal pain, earache, vomiting) AND [2] more than mild  • Negative: Breastfeeding questions about mother's medicines and diet  • Negative: MORE THAN A DOUBLE DOSE of a prescription or over-the-counter (OTC) drug  • Negative: [1] DOUBLE DOSE (an extra dose or lesser amount) of prescription drug AND [2] any symptoms (e.g., dizziness, nausea, pain, sleepiness)  • Negative: [1] DOUBLE DOSE (an extra dose or lesser amount) of over-the-counter  "(OTC) drug AND [2] any symptoms (e.g., dizziness, nausea, pain, sleepiness)  • Negative: Took another person's prescription drug  • Negative: [1] DOUBLE DOSE (an extra dose or lesser amount) of prescription drug AND [2] NO symptoms (Exception: a double dose of antibiotics)  • Negative: Diabetes drug error or overdose (e.g., took wrong type of insulin or took extra dose)  • Negative: [1] Prescription refill request for ESSENTIAL medicine (i.e., likelihood of harm to patient if not taken) AND [2] triager unable to refill per department policy  • Negative: [1] Prescription not at pharmacy AND [2] was prescribed by PCP recently (Exception: triager has access to EMR and prescription is recorded there. Go to Home Care and confirm for pharmacy.)  • Negative: [1] Pharmacy calling with prescription question AND [2] triager unable to answer question  • Negative: [1] Caller has URGENT medicine question about med that PCP or specialist prescribed AND [2] triager unable to answer question  • Negative: Medicine patch causing local rash or itching  • Negative: [1] Caller has medicine question about med NOT prescribed by PCP AND [2] triager unable to answer question (e.g., compatibility with other med, storage)  • Negative: Prescription request for new medicine (not a refill)  • Negative: Prescription refill request for a CONTROLLED substance (e.g., narcotics, ADHD medicines)  • Negative: [1] Prescription refill request for NON-ESSENTIAL medicine (i.e., no harm to patient if med not taken) AND [2] triager unable to refill per department policy    Answer Assessment - Initial Assessment Questions  1. NAME of MEDICATION: \"What medicine are you calling about?\"      Glucophage and Eliquis  2. QUESTION: \"What is your question?\" (e.g., medication refill, side effect)        3. PRESCRIBING HCP: \"Who prescribed it?\" Reason: if prescribed by specialist, call should be referred to that group.        4. SYMPTOMS: \"Do you have any symptoms?\"    " "  No c/o   5. SEVERITY: If symptoms are present, ask \"Are they mild, moderate or severe?\"        6. PREGNANCY:  \"Is there any chance that you are pregnant?\" \"When was your last menstrual period?\"    Protocols used: MEDICATION QUESTION CALL-ADULT-      "
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
## 2021-10-09 ENCOUNTER — NURSE TRIAGE (OUTPATIENT)
Dept: CALL CENTER | Facility: HOSPITAL | Age: 61
End: 2021-10-09

## 2021-10-09 ENCOUNTER — DOCUMENTATION (OUTPATIENT)
Dept: SOCIAL WORK | Facility: HOSPITAL | Age: 61
End: 2021-10-09

## 2021-10-09 RX ORDER — APIXABAN 5 MG/1
10 TABLET, FILM COATED ORAL EVERY 12 HOURS SCHEDULED
Qty: 14 TABLET | Refills: 0 | Status: CANCELLED | OUTPATIENT
Start: 2021-10-09 | End: 2021-10-13

## 2021-10-09 NOTE — TELEPHONE ENCOUNTER
Discharged 10/07/2021 prescribed Eliquis.  Has enough Eliquis to last through Sunday She will have no Eliquis for Monday. She has called the hospital pharmacy and was told they have no medication for her. Advised note will be sent to yarely. Advised to call Surgeon.    Spoke with Karmen, case management regarding Eliquis. She will call the patient.  Reason for Disposition  • [1] Prescription refill request for ESSENTIAL medicine (i.e., likelihood of harm to patient if not taken) AND [2] triager unable to refill per department policy    Additional Information  • Negative: [1] Intentional drug overdose AND [2] suicidal thoughts or ideas  • Negative: Drug overdose and triager unable to answer question  • Negative: Caller requesting information unrelated to medicine  • Negative: Caller requesting information about COVID-19 Vaccine  • Negative: Caller requesting information about Emergency Contraception  • Negative: Caller requesting information about Combined Birth Control Pills  • Negative: Caller requesting information about Progestin Birth Control Pills  • Negative: Caller requesting information about Post-Op pain or medicines  • Negative: Caller requesting a prescription antibiotic (such as Penicillin) for Strep throat and has a positive culture result  • Negative: Caller requesting a prescription anti-viral med (such as Tamiflu) and has influenza (flu)  symptoms  • Negative: Immunization reaction suspected  • Negative: Rash while taking a medicine or within 3 days of stopping it  • Negative: [1] Asthma and [2] having symptoms of asthma (cough, wheezing, etc.)  • Negative: [1] Symptom of illness (e.g., headache, abdominal pain, earache, vomiting) AND [2] more than mild  • Negative: Breastfeeding questions about mother's medicines and diet  • Negative: MORE THAN A DOUBLE DOSE of a prescription or over-the-counter (OTC) drug  • Negative: [1] DOUBLE DOSE (an extra dose or lesser amount) of prescription drug AND  "[2] any symptoms (e.g., dizziness, nausea, pain, sleepiness)  • Negative: [1] DOUBLE DOSE (an extra dose or lesser amount) of over-the-counter (OTC) drug AND [2] any symptoms (e.g., dizziness, nausea, pain, sleepiness)  • Negative: Took another person's prescription drug  • Negative: [1] DOUBLE DOSE (an extra dose or lesser amount) of prescription drug AND [2] NO symptoms (Exception: a double dose of antibiotics)  • Negative: Diabetes drug error or overdose (e.g., took wrong type of insulin or took extra dose)    Answer Assessment - Initial Assessment Questions  1. NAME of MEDICATION: \"What medicine are you calling about?\"      Eliquis  2. QUESTION: \"What is your question?\" (e.g., medication refill, side effect)   Has doses through Sunday will have no Eliquis on Monday  3. PRESCRIBING HCP: \"Who prescribed it?\" Reason: if prescribed by specialist, call should be referred to that group.   Sammy iKm  4. SYMPTOMS: \"Do you have any symptoms?\"   no  5. SEVERITY: If symptoms are present, ask \"Are they mild, moderate or severe?\"      *No Answer*  6. PREGNANCY:  \"Is there any chance that you are pregnant?\" \"When was your last menstrual period?\"      *No Answer*    Protocols used: MEDICATION QUESTION CALL-ADULT-AH    "

## 2021-10-09 NOTE — PROGRESS NOTES
Inbound call from Radha with [facility] Call Center stating pt was discharged on 10/7 and did not get the full Rx for Eliquis (she has enough for thru Sunday but none after that). Radha provided CCP with pt MRN number and phone number and requested CCP call patient. Called and spoke with Shanika Velez. Shanika states she was given enough medicine to take thru Sunday but no more. Opened pt's chart for encounter beginning 9/26. Reviewed orders from MD on 10/7/21 (Date of Discharge) with pharmacy. Pharmacy was able to see where the medication had been ordered in Epic. Rx filled using the $10 co-pay card (covered by CCP). Updated CCP mgr Yuliet HAMPTON Updated pt and she states her family member Layla Ochoa will be to the hospital to  Rx............Quiana Wylie RN

## 2021-10-11 NOTE — OUTREACH NOTE
Case Management Call Center Follow-up      Responses   [facility] Call Center Tracking Reason? Medication Clarification   Has the Call Center Case Management Follow-up issue been resolved? Yes   Follow-up Comments I called the patient and she was able to  the script this weekend.  She did not need anything else at this time.          Shannon Epley, RN    10/11/2021, 11:44 CDT

## 2021-10-12 ENCOUNTER — READMISSION MANAGEMENT (OUTPATIENT)
Dept: CALL CENTER | Facility: HOSPITAL | Age: 61
End: 2021-10-12

## 2021-10-12 NOTE — OUTREACH NOTE
General Surgery Week 1 Survey      Responses   [facility] patient discharged from? [place]   Does the patient have one of the following disease processes/diagnoses(primary or secondary)? General Surgery   Week 1 attempt successful? No   Unsuccessful attempts Attempt 1          Theresa Amato RN

## 2021-10-15 ENCOUNTER — READMISSION MANAGEMENT (OUTPATIENT)
Dept: CALL CENTER | Facility: HOSPITAL | Age: 61
End: 2021-10-15
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
## 2021-10-25 ENCOUNTER — READMISSION MANAGEMENT (OUTPATIENT)
Dept: CALL CENTER | Facility: HOSPITAL | Age: 61
End: 2021-10-25

## 2021-10-25 NOTE — OUTREACH NOTE
General Surgery Week 2 Survey      Responses   [facility] patient discharged from? [place]   Does the patient have one of the following disease processes/diagnoses(primary or secondary)? General Surgery   Week 2 attempt successful? Yes   Call start time 1352   Call end time 1354   Discharge diagnosis Tibial plateau fracture, right, closed, initial encounter    Meds reviewed with patient/caregiver? Yes   Is the patient taking all medications as directed (includes completed medication regime)? Yes   Has the patient kept scheduled appointments due by today? Yes   What is the Home health agency?  [facility]    Has home health visited the patient within 72 hours of discharge? Yes   What is the patient's perception of their health status since discharge? Improving   Week 2 call completed? Yes   Wrap up additional comments PT working with her at home.  Surgeon told her no weight bearing for 5 more weeks.            Ebony Kuo RN

## 2021-11-03 ENCOUNTER — READMISSION MANAGEMENT (OUTPATIENT)
Dept: CALL CENTER | Facility: HOSPITAL | Age: 61
End: 2021-11-03

## 2021-11-03 NOTE — OUTREACH NOTE
General Surgery Week 3 Survey      Responses   [facility] patient discharged from? [place]   Does the patient have one of the following disease processes/diagnoses(primary or secondary)? General Surgery   Week 3 attempt successful? Yes   Call start time 1142   Call end time 1145   Discharge diagnosis Tibial plateau fracture, right, closed, initial encounter    Meds reviewed with patient/caregiver? Yes   Is the patient taking all medications as directed (includes completed medication regime)? Yes   Has the patient kept scheduled appointments due by today? Yes   Home health comments Therapist coming in for therapy   What is the patient's perception of their health status since discharge? Improving   Nursing interventions Nurse provided patient education   Is the patient/caregiver able to teach back signs and symptoms of incisional infection? Increased redness, swelling or pain at the incisonal site   Is the patient/caregiver able to teach back steps to recovery at home? Rest and rebuild strength, gradually increase activity   Is the patient/caregiver able to teach back the hierarchy of who to call/visit for symptoms/problems? PCP, Specialist, Home health nurse, Urgent Care, ED, 911 Yes   Additional teach back comments Pt in good spirits and states leg hurts but it is healing and she is making good progress with therapy. She will contact Dr Tomlinson's office about insurance paperwork she needs.    Week 3 call completed? Yes          Karely Guan RN

## 2021-11-12 ENCOUNTER — READMISSION MANAGEMENT (OUTPATIENT)
Dept: CALL CENTER | Facility: HOSPITAL | Age: 61
End: 2021-11-12

## 2021-11-12 NOTE — OUTREACH NOTE
General Surgery Week 4 Survey      Responses   [facility] patient discharged from? [place]   Does the patient have one of the following disease processes/diagnoses(primary or secondary)? General Surgery   Week 4 attempt successful? Yes   Call start time 1612   Call end time 1617   Discharge diagnosis Tibial plateau fracture, right, closed, initial encounter    Is the patient taking all medications as directed (includes completed medication regime)? Yes   Has the patient kept scheduled appointments due by today? Yes   Is the patient still receiving Home Health Services? Yes   Psychosocial issues? No   Comments states will be able to bear weight in 2 weeks, using walker   What is the patient's perception of their health status since discharge? Improving   Nursing interventions Nurse provided patient education   Is the patient/caregiver able to teach back steps to recovery at home? Set small, achievable goals for return to baseline health,  Rest and rebuild strength, gradually increase activity,  Eat a well-balance diet   Is the patient/caregiver able to teach back the hierarchy of who to call/visit for symptoms/problems? PCP, Specialist, Home health nurse, Urgent Care, ED, 911 Yes   Additional teach back comments states has been healing well, has 100% knee bend, does home exercises every day   Week 4 call completed? Yes   Would the patient like one additional call? No   Graduated Yes   Is the patient interested in additional calls from an ambulatory ?  NOTE:  applies to high risk patients requiring additional follow-up. No   Did the patient feel the follow up calls were helpful during their recovery period? Yes   Was the number of calls appropriate? Yes          Theresa Amato RN

## 2022-03-17 ENCOUNTER — HOSPITAL ENCOUNTER (OUTPATIENT)
Dept: GENERAL RADIOLOGY | Facility: HOSPITAL | Age: 62
Discharge: HOME | End: 2022-03-17

## 2022-03-17 ENCOUNTER — PRE-ADMISSION TESTING (OUTPATIENT)
Dept: PREADMISSION TESTING | Facility: HOSPITAL | Age: 62
End: 2022-03-17

## 2022-03-17 VITALS
WEIGHT: 191.8 LBS | BODY MASS INDEX: 28.41 KG/M2 | TEMPERATURE: 98.8 F | RESPIRATION RATE: 16 BRPM | DIASTOLIC BLOOD PRESSURE: 78 MMHG | HEART RATE: 78 BPM | SYSTOLIC BLOOD PRESSURE: 121 MMHG | OXYGEN SATURATION: 96 % | HEIGHT: 69 IN

## 2022-03-17 LAB
ALBUMIN SERPL-MCNC: 4.8 G/DL (ref 3.5–5.2)
ALBUMIN/GLOB SERPL: 1.7 G/DL
ALP SERPL-CCNC: 68 U/L (ref 39–117)
ALT SERPL W P-5'-P-CCNC: 14 U/L (ref 1–33)
ANION GAP SERPL CALCULATED.3IONS-SCNC: 13 MMOL/L (ref 5–15)
APTT PPP: 26.7 SECONDS (ref 22.7–35.4)
AST SERPL-CCNC: 22 U/L (ref 1–32)
BASOPHILS # BLD AUTO: 0.04 10*3/MM3 (ref 0–0.2)
BASOPHILS NFR BLD AUTO: 0.5 % (ref 0–1.5)
BILIRUB SERPL-MCNC: 0.5 MG/DL (ref 0–1.2)
BILIRUB UR QL STRIP: NEGATIVE
BUN SERPL-MCNC: 11 MG/DL (ref 8–23)
BUN/CREAT SERPL: 12.5 (ref 7–25)
CALCIUM SPEC-SCNC: 9.7 MG/DL (ref 8.6–10.5)
CHLORIDE SERPL-SCNC: 103 MMOL/L (ref 98–107)
CLARITY UR: CLEAR
CO2 SERPL-SCNC: 24 MMOL/L (ref 22–29)
COLOR UR: YELLOW
CREAT SERPL-MCNC: 0.88 MG/DL (ref 0.57–1)
DEPRECATED RDW RBC AUTO: 49.8 FL (ref 37–54)
EGFRCR SERPLBLD CKD-EPI 2021: 74.4 ML/MIN/1.73
EOSINOPHIL # BLD AUTO: 0.15 10*3/MM3 (ref 0–0.4)
EOSINOPHIL NFR BLD AUTO: 1.9 % (ref 0.3–6.2)
ERYTHROCYTE [DISTWIDTH] IN BLOOD BY AUTOMATED COUNT: 15 % (ref 12.3–15.4)
GLOBULIN UR ELPH-MCNC: 2.8 GM/DL
GLUCOSE SERPL-MCNC: 100 MG/DL (ref 65–99)
GLUCOSE UR STRIP-MCNC: ABNORMAL MG/DL
HBA1C MFR BLD: 6.7 % (ref 4.8–5.6)
HCT VFR BLD AUTO: 42.1 % (ref 34–46.6)
HGB BLD-MCNC: 13.4 G/DL (ref 12–15.9)
HGB UR QL STRIP.AUTO: NEGATIVE
IMM GRANULOCYTES # BLD AUTO: 0.03 10*3/MM3 (ref 0–0.05)
IMM GRANULOCYTES NFR BLD AUTO: 0.4 % (ref 0–0.5)
INR PPP: 0.95 (ref 0.9–1.1)
KETONES UR QL STRIP: NEGATIVE
LEUKOCYTE ESTERASE UR QL STRIP.AUTO: NEGATIVE
LYMPHOCYTES # BLD AUTO: 2.7 10*3/MM3 (ref 0.7–3.1)
LYMPHOCYTES NFR BLD AUTO: 33.5 % (ref 19.6–45.3)
MCH RBC QN AUTO: 28.6 PG (ref 26.6–33)
MCHC RBC AUTO-ENTMCNC: 31.8 G/DL (ref 31.5–35.7)
MCV RBC AUTO: 89.8 FL (ref 79–97)
MONOCYTES # BLD AUTO: 0.61 10*3/MM3 (ref 0.1–0.9)
MONOCYTES NFR BLD AUTO: 7.6 % (ref 5–12)
NEUTROPHILS NFR BLD AUTO: 4.52 10*3/MM3 (ref 1.7–7)
NEUTROPHILS NFR BLD AUTO: 56.1 % (ref 42.7–76)
NITRITE UR QL STRIP: NEGATIVE
NRBC BLD AUTO-RTO: 0 /100 WBC (ref 0–0.2)
PH UR STRIP.AUTO: 6 [PH] (ref 5–8)
PLATELET # BLD AUTO: 254 10*3/MM3 (ref 140–450)
PMV BLD AUTO: 10.6 FL (ref 6–12)
POTASSIUM SERPL-SCNC: 4.1 MMOL/L (ref 3.5–5.2)
PROT SERPL-MCNC: 7.6 G/DL (ref 6–8.5)
PROT UR QL STRIP: NEGATIVE
PROTHROMBIN TIME: 12.6 SECONDS (ref 11.7–14.2)
QT INTERVAL: 389 MS
RBC # BLD AUTO: 4.69 10*6/MM3 (ref 3.77–5.28)
SODIUM SERPL-SCNC: 140 MMOL/L (ref 136–145)
SP GR UR STRIP: <=1.005 (ref 1–1.03)
UROBILINOGEN UR QL STRIP: ABNORMAL
WBC NRBC COR # BLD: 8.05 10*3/MM3 (ref 3.4–10.8)

## 2022-03-17 PROCEDURE — 71046 X-RAY EXAM CHEST 2 VIEWS: CPT

## 2022-03-17 PROCEDURE — 80053 COMPREHEN METABOLIC PANEL: CPT

## 2022-03-17 PROCEDURE — 81003 URINALYSIS AUTO W/O SCOPE: CPT

## 2022-03-17 PROCEDURE — 93005 ELECTROCARDIOGRAM TRACING: CPT

## 2022-03-17 PROCEDURE — 93010 ELECTROCARDIOGRAM REPORT: CPT | Performed by: INTERNAL MEDICINE

## 2022-03-17 PROCEDURE — 83036 HEMOGLOBIN GLYCOSYLATED A1C: CPT

## 2022-03-17 PROCEDURE — 36415 COLL VENOUS BLD VENIPUNCTURE: CPT

## 2022-03-17 PROCEDURE — 73560 X-RAY EXAM OF KNEE 1 OR 2: CPT

## 2022-03-17 PROCEDURE — 85610 PROTHROMBIN TIME: CPT

## 2022-03-17 PROCEDURE — 85025 COMPLETE CBC W/AUTO DIFF WBC: CPT

## 2022-03-17 PROCEDURE — 85730 THROMBOPLASTIN TIME PARTIAL: CPT

## 2022-03-17 RX ORDER — CHLORHEXIDINE GLUCONATE 500 MG/1
CLOTH TOPICAL
COMMUNITY

## 2022-03-17 RX ORDER — ALBUTEROL SULFATE 90 UG/1
2 AEROSOL, METERED RESPIRATORY (INHALATION) EVERY 4 HOURS PRN
COMMUNITY

## 2022-03-17 RX ORDER — MONTELUKAST SODIUM 4 MG/1
1 TABLET, CHEWABLE ORAL AS NEEDED
COMMUNITY

## 2022-03-17 RX ORDER — LEVOTHYROXINE SODIUM 112 UG/1
112 TABLET ORAL DAILY
COMMUNITY

## 2022-03-17 RX ORDER — OMEPRAZOLE/SODIUM BICARBONATE 20MG-1.1G
1 CAPSULE ORAL DAILY
COMMUNITY

## 2022-03-17 ASSESSMENT — KOOS JR
KOOS JR SCORE: 47.487
KOOS JR SCORE: 16

## 2022-03-17 NOTE — DISCHARGE INSTRUCTIONS
CHLORHEXIDINE CLOTH INSTRUCTIONS  The morning of surgery follow these instructions using the Chlorhexidine cloths you've been given.  These steps reduce bacteria on the body.  Do not use the cloths near your eyes, ears mouth, genitalia or on open wounds.  Throw the cloths away after use but do not try to flush them down a toilet.      Open and remove one cloth at a time from the package.    Leave the cloth unfolded and begin the bathing.  Massage the skin with the cloths using gentle pressure to remove bacteria.  Do not scrub harshly.   Follow the steps below with one 2% CHG cloth per area (6 total cloths).  One cloth for neck, shoulders and chest.  One cloth for both arms, hands, fingers and underarms (do underarms last).  One cloth for the abdomen followed by groin.  One cloth for right leg and foot including between the toes.  One cloth for left leg and foot including between the toes.  The last cloth is to be used for the back of the neck, back and buttocks.    Allow the CHG to air dry 3 minutes on the skin which will give it time to work and decrease the chance of irritation.  The skin may feel sticky until it is dry.  Do not rinse with water or any other liquid or you will lose the beneficial effects of the CHG.  If mild skin irritation occurs, do rinse the skin to remove the CHG.  Report this to the nurse at time of admission.  Do not apply lotions, creams, ointments, deodorants or perfumes after using the clothes. Dress in clean clothes before coming to the hospital.    BACTROBAN NASAL OINTMENT  There are many germs normally in your nose. Bactroban is an ointment that will help reduce these germs. Please follow these instructions for Bactroban use:      ____The day before surgery in the morning  Date________    ____The day before surgery in the evening              Date________    ____The day of surgery in the morning    Date________    **Squirt ½ package of Bactroban Ointment onto a cotton applicator and  apply to inside of 1st nostril.  Squirt the remaining Bactroban and apply to the inside of the other nostril.    Take the following medications the morning of surgery:  INHALER, LEVOTHYROXINE      ARRIVAL TIME 1200 NOON ON 4/4/22      If you are on prescription narcotic pain medication to control your pain you may also take that medication the morning of surgery.    General Instructions:  Do not eat solid food after midnight the night before surgery.  You may drink clear liquids day of surgery but must stop at least one hour before your hospital arrival time.  It is beneficial for you to have a clear drink that contains carbohydrates the day of surgery.  We suggest a 12 to 20 ounce bottle of Gatorade or Powerade for non-diabetic patients or a 12 to 20 ounce bottle of G2 or Powerade Zero for diabetic patients. (Pediatric patients, are not advised to drink a 12 to 20 ounce carbohydrate drink)    Clear liquids are liquids you can see through.  Nothing red in color.     Plain water                               Sports drinks  Sodas                                   Gelatin (Jell-O)  Fruit juices without pulp such as white grape juice and apple juice  Popsicles that contain no fruit or yogurt  Tea or coffee (no cream or milk added)  Gatorade / Powerade  G2 / Powerade Zero    Infants may have breast milk up to four hours before surgery.  Infants drinking formula may drink formula up to six hours before surgery.   Patients who avoid smoking, chewing tobacco and alcohol for 4 weeks prior to surgery have a reduced risk of post-operative complications.  Quit smoking as many days before surgery as you can.  Do not smoke, use chewing tobacco or drink alcohol the day of surgery.   If applicable bring your C-PAP/ BI-PAP machine.  Bring any papers given to you in the doctor’s office.  Wear clean comfortable clothes.  Do not wear contact lenses, false eyelashes or make-up.  Bring a case for your glasses.   Bring crutches or walker  if applicable.  Remove all piercings.  Leave jewelry and any other valuables at home.  Hair extensions with metal clips must be removed prior to surgery.  The Pre-Admission Testing nurse will instruct you to bring medications if unable to obtain an accurate list in Pre-Admission Testing.            Preventing a Surgical Site Infection:  For 2 to 3 days before surgery, avoid shaving with a razor because the razor can irritate skin and make it easier to develop an infection.    Any areas of open skin can increase the risk of a post-operative wound infection by allowing bacteria to enter and travel throughout the body.  Notify your surgeon if you have any skin wounds / rashes even if it is not near the expected surgical site.  The area will need assessed to determine if surgery should be delayed until it is healed.  The night prior to surgery shower using a fresh bar of anti-bacterial soap (such as Dial) and clean washcloth.  Sleep in a clean bed with clean clothing.  Do not allow pets to sleep with you.  Shower on the morning of surgery using a fresh bar of anti-bacterial soap (such as Dial) and clean washcloth.  Dry with a clean towel and dress in clean clothing.  Ask your surgeon if you will be receiving antibiotics prior to surgery.  Make sure you, your family, and all healthcare providers clean their hands with soap and water or an alcohol based hand  before caring for you or your wound.    Day of surgery:  Your arrival time is approximately two hours before your scheduled surgery time.  Upon arrival, a Pre-op nurse and Anesthesiologist will review your health history, obtain vital signs, and answer questions you may have.  The only belongings needed at this time will be a list of your home medications and if applicable your C-PAP/BI-PAP machine.  A Pre-op nurse will start an IV and you may receive medication in preparation for surgery, including something to help you relax.     Please be aware that  surgery does come with discomfort.  We want to make every effort to control your discomfort so please discuss any uncontrolled symptoms with your nurse.   Your doctor will most likely have prescribed pain medications.      If you are going home after surgery you will receive individualized written care instructions before being discharged.  A responsible adult must drive you to and from the hospital on the day of your surgery and stay with you for 24 hours.  Discharge prescriptions can be filled by the hospital pharmacy during regular pharmacy hours.  If you are having surgery late in the day/evening your prescription may be e-prescribed to your pharmacy.  Please verify your pharmacy hours or chose a 24 hour pharmacy to avoid not having access to your prescription because your pharmacy has closed for the day.    If you are staying overnight following surgery, you will be transported to your hospital room following the recovery period.  [facility] has all private rooms.    If you have any questions please call Pre-Admission Testing at [phone].  Deductibles and co-payments are collected on the day of service. Please be prepared to pay the required co-pay, deductible or deposit on the day of service as defined by your plan.    Patient Education for Self-Quarantine Process    Following your COVID testing, we strongly recommend that you wear a mask when you are with other people and practice social distancing.   Limit your activities to only required outings.  Wash your hands with soap and water frequently for at least 20 seconds.   Avoid touching your eyes, nose and mouth with unwashed hands.  Do not share anything - utensils, drinking glasses, food from the same bowl.   Sanitize household surfaces daily. Include all high touch areas (door handles, light switches, phones, countertops, etc.)    Call your surgeon immediately if you experience any of the following symptoms:  Sore Throat  Shortness of  Breath or difficulty breathing  Cough  Chills  Body soreness or muscle pain  Headache  Fever  New loss of taste or smell  Do not arrive for your surgery ill.  Your procedure will need to be rescheduled to another time.  You will need to call your physician before the day of surgery to avoid any unnecessary exposure to hospital staff as well as other patients.

## 2022-03-24 ENCOUNTER — TELEPHONE (OUTPATIENT)
Dept: ORTHOPEDIC SURGERY | Facility: HOSPITAL | Age: 62
End: 2022-03-24

## 2022-03-24 NOTE — TELEPHONE ENCOUNTER
Risk Factor yes no   Age >75  X   BMI <20 >40  X   Patient History     Chronic Pain (2 or more meds/Pain Management)  X   ETOH (more than 3 drinks Daily)  X   Uncontrolled Depression/Bipolar/Schizoaffective Disorder  X   Arrhythmias  X   Stent placement/MI  X   DVT/PE  X   Pacemaker  X   HTN (uncontrolled or requiring more than 2 medications)  X   CHF/Retained fluids/Edema  X   Stroke with Residual   X   COPD/Asthma X    RAYMOND--Non-compliant with CPAP  X   Diabetes (on insulin or more than 2 meds)         A1C:6.7 X    BPH/Urinary retention (on medication)  X   CKD  X   Home environment and support     Current ambulation status (use of cane, walker, W/C, Multiple falls/weakness)  X   Stairs to enter and throughout home X    Lives Alone  X   Doesn’t have support at home  X     Outpatient Screening Assessment    Home needs: (Walker/BSC):  Has Both  ? Steps  15 steps to get into the house  Caregiver 24-48hrs post-discharge: Roomate    Discharge Plan:  HH    Prescriptions: Meds to bed    Home medications:   ? Blood thinner/anti-coag therapy--  ? BPH or diuretic  ? BP meds--Hygroten, Metoprolol  ? Pain/Anti-inflammatories--Mocic  Pre-op Education:  Educate patient on spinal anesthesia/pain control:  ? patient verbalize understanding    Educate patient on hospital course/timeline:  ?  patient verbalize understanding    Joint Care Class:  ?  yes ? no      Notes:  JASON  H/O Asthma, was told to bring inhaler with her  Was asking about a brace with this surgery, she said she was having the hardware removed but I didn’t see that with the case request  H/O PONV  H/O DM--Metformin and Farxiga

## 2022-04-01 ENCOUNTER — LAB (OUTPATIENT)
Dept: LAB | Facility: HOSPITAL | Age: 62
End: 2022-04-01

## 2022-04-01 LAB — SARS-COV-2 ORF1AB RESP QL NAA+PROBE: NOT DETECTED

## 2022-04-01 PROCEDURE — U0004 COV-19 TEST NON-CDC HGH THRU: HCPCS

## 2022-04-01 PROCEDURE — C9803 HOPD COVID-19 SPEC COLLECT: HCPCS

## 2022-04-03 RX ORDER — OXYCODONE HYDROCHLORIDE AND ACETAMINOPHEN 5; 325 MG/1; MG/1
1 TABLET ORAL ONCE AS NEEDED
Status: DISCONTINUED | OUTPATIENT
Start: 2022-04-03 | End: 2022-04-04 | Stop reason: HOSPADM

## 2022-04-03 RX ORDER — ASPIRIN 81 MG/1
81 TABLET ORAL 2 TIMES DAILY
Qty: 60 TABLET | Refills: 0 | Status: SHIPPED | OUTPATIENT
Start: 2022-04-04 | End: 2022-05-04

## 2022-04-03 RX ORDER — ACETAMINOPHEN 325 MG/1
650 TABLET ORAL EVERY 6 HOURS PRN
Status: DISCONTINUED | OUTPATIENT
Start: 2022-04-03 | End: 2022-04-04 | Stop reason: HOSPADM

## 2022-04-03 RX ORDER — ONDANSETRON 4 MG/1
4 TABLET, FILM COATED ORAL EVERY 6 HOURS PRN
Status: DISCONTINUED | OUTPATIENT
Start: 2022-04-03 | End: 2022-04-04 | Stop reason: HOSPADM

## 2022-04-03 RX ORDER — ONDANSETRON 4 MG/1
4 TABLET, FILM COATED ORAL EVERY 8 HOURS PRN
Qty: 10 TABLET | Refills: 0 | Status: SHIPPED | OUTPATIENT
Start: 2022-04-03

## 2022-04-03 RX ORDER — PROMETHAZINE HYDROCHLORIDE 25 MG/1
12.5 TABLET ORAL EVERY 4 HOURS PRN
Status: DISCONTINUED | OUTPATIENT
Start: 2022-04-03 | End: 2022-04-04 | Stop reason: HOSPADM

## 2022-04-03 RX ORDER — ONDANSETRON 2 MG/ML
4 INJECTION INTRAMUSCULAR; INTRAVENOUS ONCE AS NEEDED
Status: DISCONTINUED | OUTPATIENT
Start: 2022-04-03 | End: 2022-04-04 | Stop reason: HOSPADM

## 2022-04-03 RX ORDER — ASPIRIN 81 MG/1
81 TABLET ORAL 2 TIMES DAILY
Status: DISCONTINUED | OUTPATIENT
Start: 2022-04-04 | End: 2022-04-04 | Stop reason: HOSPADM

## 2022-04-03 RX ORDER — DOCUSATE SODIUM 100 MG/1
100 CAPSULE, LIQUID FILLED ORAL 2 TIMES DAILY PRN
Qty: 30 CAPSULE | Refills: 0 | Status: SHIPPED | OUTPATIENT
Start: 2022-04-03

## 2022-04-03 NOTE — DISCHARGE INSTRUCTIONS
Total Knee  Discharge Instructions  Dr. DAMION Van” Davi II  [phone]    INCISION CARE  Wash your hands prior to dressing changes  APPLE Wound VAC: Postoperatively you had a APPLE Wound Vac placed on the incision. This was placed under sterile conditions in the operating room. It remains in place for 7 days postoperatively. After 7 days, the entire dressing must be removed, including all of the sticky adhesive. The dressing and battery pack provide gentle suction to the incision and provide several benefits over a traditional dressing:  It maintains the sterile environment of the OR and reduces the risk of infection  The suction removes unwanted buildup of blood/hematoma under the skin to reduce swelling  The suction also promotes fresh blood supply to the skin and soft tissue to speed up healing  The postoperative scar is reduced in size  Showering is permitted immediately after surgery, but the battery pack must be protected or removed during the shower.   After 7 days the APPLE Wound Vac is removed. If there is no drainage, no dressing is required. If there is some scant drainage a dry bandage can be applied and changed daily until seen in the office or until the drainage stops.   No creams or ointments to the incisions until 4 weeks post op.  Do not touch or pick at the incision  Check incision every day and notify surgeon immediately if any of the following signs or symptoms are seen:  Increase in redness  Increase in swelling around the incision and of the entire extremity  Increase in pain  NEW drainage or oozing from the incision  Pulling apart of the edges of the incision  Increase in overall body temperature (greater than 100.4 degrees)  Zip-Line: your incision was closed with a state of the art device.   Is a non-invasive and easy to use wound closure device that replaces sutures, staples and glue for surgical incisions  It minimizes scarring and eliminating “railroad” marks that come with staples or  sutures  It makes removal as atraumatic as peeling off a bandage  Can be removed at home or by a physical therapist or nurse at 14 days postoperatively    ACTIVITIES  Exercises:  Physical therapy will begin immediately while in the hospital. Patients going to a nursing home will get therapy as part of their care at the SNU/SNF facility. Patients going home may also have a therapist come to the house to help them mobilize until they can safely get to an outpatient therapy facility.  Elevate the affected leg most of the day during the first week post operatively. Caution must be taken to avoid pillow placement directly under the heel of the leg, as this can cause pressure ulcers even with a soft pillow. All pillows and blankets should be placed underneath of the thigh and calf so that the heel is free-floating.  Use cold packs for 20-30 minutes approximately 5 times per day.  You should perform the daily stretching and strengthening exercises as taught by the therapist as often as possible. This can be done many times a day.  Full weight bearing is allowed after surgery. It will be sore/painful to put weight on the leg, but this will help the bone to heal and prevent complications such as pneumonia, bed sores and blood clots. Mobilization is vital to the recovery process.  Activities of Daily Living:  No tub baths, hot tubs, or swimming pools for 4 weeks.  May shower and let water run over the incision immediately after surgery. The battery pack of the APPLE Wound Vac must be protected or removed while in the shower. After the APPLE is removed 7 days after surgery showering is permitted as long as there is no drainage from the wound.     Restrictions  Weight: It is ok to allow full weight bearing after surgery. Weight on the leg actually quickens the recovery process. While it will be sore/painful to put weight on the leg, it is safe to do so. Hip replacement after hip fracture has a much slower recover process. It can  take months to heal fully from a hip fracture and patients even make some slow benefits up to a year afterwards.   Driving: Many patients have questions about when it is safe to return to driving. The answer is that this is extremely variable. It depends on the extent of the surgery, as well as how quickly you heal. Certainly left leg surgeries make returning to driving easier while right leg surgeries require more extensive rehabilitation before driving can be safe. Until you can press down on the brake hard, and are off of all narcotics, driving is not permitted. Your surgeon cannot “clear” you to return to driving, only you can make the decision when you feel it is safe.    Medications  Anticoagulants: After upper extremity surgery most patients do not require an anticoagulant unless you have another injury that will be keeping you from mobilizing. Lower extremity surgery typically does require use of an anticoagulation medicine.   IF YOU HAD LOWER EXTREMITY SURGERY AND ARE NOT DISCHARGED HOME WITH ANY ANTICOAGULANT MEDICINE YOU SHOULD TAKE ASPIRIN 325mg DAILY FOR 30 DAYS POSTOPERATIVELY.  If you are discharged home with an anticoagulant such as Aspirin, Xarelto, Eliquis, Coumadin, or Lovenox, follow these simple instructions:   Notify surgeon immediately if any ladarius bleeding is noted in the urine, stool, emesis, or from the nose or the incision. Blood in the stool will often appear as black rather than red. Blood in urine may appear as pink. Blood in emesis may appear as brown/black like coffee grounds.  You will need to apply pressure for longer periods of time to any cuts or abrasions to stop bleeding  Avoid alcohol while taking anticoagulants  Most anticoagulants are to be taken for 30 days postoperatively. After this time, you may stop using them unless instructed otherwise.   If you were already taking an anticoagulant (commonly Aspirin, Coumadin, or Plavix) you will likely be resuming your normal dose  postoperatively and will be continuing that medication at the discretion of the prescribing physician.  Stool Softeners: You will be at greater risk of constipation after surgery due to being less mobile and the pain medications.  Take stool softeners as needed. Over the counter Colace 100 mg 1-2 capsules twice daily can be taken.  If stools become too loose or too frequent, please decreases the dosage or stop the stool softener.  If constipation occurs despite use of stool softeners, you are to continue the stool softeners and add a laxative (Milk of Magnesia 1 ounce daily as needed)  Drink plenty of fluids, and eat fruits and vegetables during your recovery time. Getting up and mobilizing will help the bowels to recover their regular function, as will weaning off of all narcotics when the pain becomes tolerable.  Pain Medications: Utilized after surgery are narcotics. This is some general information about these medications.  CLASSIFICATION: Pain medications are called Opioids and are narcotics  LEGALITIES: It is illegal to share narcotics with others  DRIVING: it is illegal to drive while under the influence of narcotics. Doing so is a DUI.  POTENTIAL SIDE EFFECTS: nausea, vomiting, itching, dizziness, drowsiness, dry mouth, constipation, and difficulty urinating.  POTENTIAL ADVERSE EFFECTS:  Opioid tolerance can develop with use of pain medications and this simply means that it requires more and more of the medication to control pain. However, this is seen more in patients that use opioids for longer periods of time.  Opioid dependence can develop with use of Opioids. People with opioid dependence will experience withdrawal symptoms upon cessation of the medication.  Opioid addiction can develop with use of Opioids. The incidence of this is very unlikely in patients who take the medications as ordered and stop the medications as instructed.  Opioid overdose can be dangerous, but is unlikely when the medication  is taken as ordered and stopped when ordered. It is important not to mix opioids with alcohol as this can lead to over sedation and respiratory difficulty.  DOSAGE:  After the initial surgical pain begins to resolve, you may begin to decrease the pain medication. By the end of a few weeks, you should be off of pain medications.  Refills will not be given by the office during evening hours, on weekends, or after 6 weeks post-op. You are responsible for weaning off of pain medication. You can increase the time between narcotic pills, taking one every 4 then 6 then 8 hours and so on.  To seek refills on pain medications during the initial 6-week post-operative period, you must call the office to request the refill. The office will then notify you when to  the prescription. DO NOT wait until you are out of the medication to request a refill. Prescriptions will not be filled over the weekend and depending on the schedule, it may take a couple days for the prescription to be available. Someone will have to pick the prescription in person at the office.    FOLLOW-UP VISITS  You will need to follow up in the office with your surgeon in 3 weeks, or as instructed elsewhere in your discharge paperwork. Please call this number [phone] to schedule this appointment. If you are going to an SNF/SNU facility, they will arrange for you to follow up in the office.  If you have any concerns or suspected complications prior to your follow up visit, please call the office. Do not wait until your appointment time if you suspect complications. These will need to be addressed in the office promptly.      Eitan Tomlinson II, MD  Orthopaedic Surgery  [facility]

## 2022-04-04 ENCOUNTER — ANESTHESIA EVENT (OUTPATIENT)
Dept: PERIOP | Facility: HOSPITAL | Age: 62
End: 2022-04-04

## 2022-04-04 ENCOUNTER — APPOINTMENT (OUTPATIENT)
Dept: GENERAL RADIOLOGY | Facility: HOSPITAL | Age: 62
End: 2022-04-04

## 2022-04-04 ENCOUNTER — ANESTHESIA (OUTPATIENT)
Dept: PERIOP | Facility: HOSPITAL | Age: 62
End: 2022-04-04

## 2022-04-04 ENCOUNTER — HOSPITAL ENCOUNTER (OUTPATIENT)
Facility: HOSPITAL | Age: 62
Setting detail: HOSPITAL OUTPATIENT SURGERY
Discharge: HOME | End: 2022-04-04
Attending: ORTHOPAEDIC SURGERY | Admitting: ORTHOPAEDIC SURGERY

## 2022-04-04 VITALS
BODY MASS INDEX: 28.55 KG/M2 | WEIGHT: 193.34 LBS | SYSTOLIC BLOOD PRESSURE: 139 MMHG | RESPIRATION RATE: 18 BRPM | OXYGEN SATURATION: 95 % | DIASTOLIC BLOOD PRESSURE: 88 MMHG | HEART RATE: 105 BPM | TEMPERATURE: 97.8 F

## 2022-04-04 DIAGNOSIS — Z96.659 STATUS POST KNEE REPLACEMENT, UNSPECIFIED LATERALITY: Primary | ICD-10-CM

## 2022-04-04 LAB
GLUCOSE BLDC GLUCOMTR-MCNC: 112 MG/DL (ref 70–130)
GLUCOSE BLDC GLUCOMTR-MCNC: 122 MG/DL (ref 70–130)

## 2022-04-04 PROCEDURE — 97110 THERAPEUTIC EXERCISES: CPT

## 2022-04-04 PROCEDURE — 25010000002 EPINEPHRINE 1 MG/ML SOLUTION 30 ML VIAL: Performed by: ORTHOPAEDIC SURGERY

## 2022-04-04 PROCEDURE — 73560 X-RAY EXAM OF KNEE 1 OR 2: CPT

## 2022-04-04 PROCEDURE — 97116 GAIT TRAINING THERAPY: CPT

## 2022-04-04 PROCEDURE — 25010000002 PROPOFOL 10 MG/ML EMULSION

## 2022-04-04 PROCEDURE — 97161 PT EVAL LOW COMPLEX 20 MIN: CPT

## 2022-04-04 PROCEDURE — C1713 ANCHOR/SCREW BN/BN,TIS/BN: HCPCS | Performed by: ORTHOPAEDIC SURGERY

## 2022-04-04 PROCEDURE — 25010000002 ROPIVACAINE PER 1 MG: Performed by: ORTHOPAEDIC SURGERY

## 2022-04-04 PROCEDURE — 25010000002 KETOROLAC TROMETHAMINE PER 15 MG: Performed by: ORTHOPAEDIC SURGERY

## 2022-04-04 PROCEDURE — 82962 GLUCOSE BLOOD TEST: CPT

## 2022-04-04 PROCEDURE — 25010000002 FENTANYL CITRATE (PF) 50 MCG/ML SOLUTION

## 2022-04-04 PROCEDURE — 25010000002 MIDAZOLAM PER 1 MG: Performed by: ANESTHESIOLOGY

## 2022-04-04 PROCEDURE — C1889 IMPLANT/INSERT DEVICE, NOC: HCPCS | Performed by: ORTHOPAEDIC SURGERY

## 2022-04-04 PROCEDURE — 25010000002 CLONIDINE PER 1 MG: Performed by: ORTHOPAEDIC SURGERY

## 2022-04-04 PROCEDURE — 25010000002 ONDANSETRON PER 1 MG

## 2022-04-04 PROCEDURE — 25010000002 DEXAMETHASONE PER 1 MG

## 2022-04-04 PROCEDURE — 25010000002 CEFAZOLIN IN DEXTROSE 2-4 GM/100ML-% SOLUTION: Performed by: ORTHOPAEDIC SURGERY

## 2022-04-04 PROCEDURE — C1776 JOINT DEVICE (IMPLANTABLE): HCPCS | Performed by: ORTHOPAEDIC SURGERY

## 2022-04-04 DEVICE — DEV CONTRL TISS STRATAFIX SPIRAL PLS PDS SH 2/0 30CM: Type: IMPLANTABLE DEVICE | Site: KNEE | Status: FUNCTIONAL

## 2022-04-04 DEVICE — IMPLANTABLE DEVICE: Type: IMPLANTABLE DEVICE | Site: KNEE | Status: FUNCTIONAL

## 2022-04-04 DEVICE — IMPLANTABLE DEVICE: Type: IMPLANTABLE DEVICE | Status: FUNCTIONAL

## 2022-04-04 DEVICE — JOURNEY II BCS FEMORAL OXINIUM                                    RIGHT SIZE 4
Type: IMPLANTABLE DEVICE | Site: KNEE | Status: FUNCTIONAL
Brand: JOURNEY

## 2022-04-04 DEVICE — JOURNEY 7.5 ROUND RESURF PAT 32MM STANDARD
Type: IMPLANTABLE DEVICE | Site: KNEE | Status: FUNCTIONAL
Brand: JOURNEY

## 2022-04-04 DEVICE — DEV CONTRL TISS STRATAFIX SYMM PDS PLUS VIL CT-1 45CM: Type: IMPLANTABLE DEVICE | Site: KNEE | Status: FUNCTIONAL

## 2022-04-04 DEVICE — CMT BONE PALACOS R HI/VISC 1X40: Type: IMPLANTABLE DEVICE | Site: KNEE | Status: FUNCTIONAL

## 2022-04-04 DEVICE — JOURNEY II BCS XLPE ARTICULAR                                    INSERT SZ 3-4 RIGHT 12MM
Type: IMPLANTABLE DEVICE | Site: KNEE | Status: FUNCTIONAL
Brand: JOURNEY

## 2022-04-04 DEVICE — JOURNEY TIBIAL BASEPLATE NONPOROUS                                    RIGHT SIZE 3
Type: IMPLANTABLE DEVICE | Site: KNEE | Status: FUNCTIONAL
Brand: JOURNEY

## 2022-04-04 RX ORDER — FENTANYL CITRATE 50 UG/ML
INJECTION, SOLUTION INTRAMUSCULAR; INTRAVENOUS AS NEEDED
Status: DISCONTINUED | OUTPATIENT
Start: 2022-04-04 | End: 2022-04-04 | Stop reason: SURG

## 2022-04-04 RX ORDER — HYDRALAZINE HYDROCHLORIDE 20 MG/ML
5 INJECTION INTRAMUSCULAR; INTRAVENOUS
Status: DISCONTINUED | OUTPATIENT
Start: 2022-04-04 | End: 2022-04-04 | Stop reason: HOSPADM

## 2022-04-04 RX ORDER — PROMETHAZINE HYDROCHLORIDE 25 MG/1
25 SUPPOSITORY RECTAL ONCE AS NEEDED
Status: DISCONTINUED | OUTPATIENT
Start: 2022-04-04 | End: 2022-04-04 | Stop reason: HOSPADM

## 2022-04-04 RX ORDER — LIDOCAINE HYDROCHLORIDE 20 MG/ML
INJECTION, SOLUTION INFILTRATION; PERINEURAL AS NEEDED
Status: DISCONTINUED | OUTPATIENT
Start: 2022-04-04 | End: 2022-04-04 | Stop reason: SURG

## 2022-04-04 RX ORDER — FENTANYL CITRATE 50 UG/ML
50 INJECTION, SOLUTION INTRAMUSCULAR; INTRAVENOUS
Status: DISCONTINUED | OUTPATIENT
Start: 2022-04-04 | End: 2022-04-04 | Stop reason: HOSPADM

## 2022-04-04 RX ORDER — OXYCODONE HYDROCHLORIDE AND ACETAMINOPHEN 5; 325 MG/1; MG/1
1 TABLET ORAL EVERY 4 HOURS PRN
Qty: 50 TABLET | Refills: 0 | Status: SHIPPED | OUTPATIENT
Start: 2022-04-04

## 2022-04-04 RX ORDER — ROCURONIUM BROMIDE 10 MG/ML
INJECTION, SOLUTION INTRAVENOUS AS NEEDED
Status: DISCONTINUED | OUTPATIENT
Start: 2022-04-04 | End: 2022-04-04 | Stop reason: SURG

## 2022-04-04 RX ORDER — CELECOXIB 200 MG/1
200 CAPSULE ORAL ONCE
Status: COMPLETED | OUTPATIENT
Start: 2022-04-04 | End: 2022-04-04

## 2022-04-04 RX ORDER — MIDAZOLAM HYDROCHLORIDE 1 MG/ML
1 INJECTION INTRAMUSCULAR; INTRAVENOUS
Status: DISCONTINUED | OUTPATIENT
Start: 2022-04-04 | End: 2022-04-04 | Stop reason: HOSPADM

## 2022-04-04 RX ORDER — PROMETHAZINE HYDROCHLORIDE 25 MG/1
25 TABLET ORAL ONCE AS NEEDED
Status: DISCONTINUED | OUTPATIENT
Start: 2022-04-04 | End: 2022-04-04 | Stop reason: HOSPADM

## 2022-04-04 RX ORDER — DEXAMETHASONE SODIUM PHOSPHATE 10 MG/ML
INJECTION INTRAMUSCULAR; INTRAVENOUS AS NEEDED
Status: DISCONTINUED | OUTPATIENT
Start: 2022-04-04 | End: 2022-04-04 | Stop reason: SURG

## 2022-04-04 RX ORDER — FLUMAZENIL 0.1 MG/ML
0.2 INJECTION INTRAVENOUS AS NEEDED
Status: DISCONTINUED | OUTPATIENT
Start: 2022-04-04 | End: 2022-04-04 | Stop reason: HOSPADM

## 2022-04-04 RX ORDER — TRANEXAMIC ACID 100 MG/ML
INJECTION, SOLUTION INTRAVENOUS AS NEEDED
Status: DISCONTINUED | OUTPATIENT
Start: 2022-04-04 | End: 2022-04-04 | Stop reason: SURG

## 2022-04-04 RX ORDER — ONDANSETRON 2 MG/ML
4 INJECTION INTRAMUSCULAR; INTRAVENOUS ONCE AS NEEDED
Status: DISCONTINUED | OUTPATIENT
Start: 2022-04-04 | End: 2022-04-04 | Stop reason: HOSPADM

## 2022-04-04 RX ORDER — SODIUM CHLORIDE 0.9 % (FLUSH) 0.9 %
10 SYRINGE (ML) INJECTION EVERY 12 HOURS SCHEDULED
Status: DISCONTINUED | OUTPATIENT
Start: 2022-04-04 | End: 2022-04-04 | Stop reason: HOSPADM

## 2022-04-04 RX ORDER — ONDANSETRON 2 MG/ML
INJECTION INTRAMUSCULAR; INTRAVENOUS AS NEEDED
Status: DISCONTINUED | OUTPATIENT
Start: 2022-04-04 | End: 2022-04-04 | Stop reason: SURG

## 2022-04-04 RX ORDER — ACETAMINOPHEN 500 MG
1000 TABLET ORAL ONCE
Status: COMPLETED | OUTPATIENT
Start: 2022-04-04 | End: 2022-04-04

## 2022-04-04 RX ORDER — OXYCODONE HYDROCHLORIDE 5 MG/1
5 TABLET ORAL ONCE
Status: COMPLETED | OUTPATIENT
Start: 2022-04-04 | End: 2022-04-04

## 2022-04-04 RX ORDER — MAGNESIUM HYDROXIDE 1200 MG/15ML
LIQUID ORAL AS NEEDED
Status: DISCONTINUED | OUTPATIENT
Start: 2022-04-04 | End: 2022-04-04 | Stop reason: HOSPADM

## 2022-04-04 RX ORDER — WOUND DRESSING ADHESIVE - LIQUID
LIQUID MISCELLANEOUS AS NEEDED
Status: DISCONTINUED | OUTPATIENT
Start: 2022-04-04 | End: 2022-04-04 | Stop reason: HOSPADM

## 2022-04-04 RX ORDER — TRANEXAMIC ACID 100 MG/ML
1000 INJECTION, SOLUTION INTRAVENOUS ONCE
Status: DISCONTINUED | OUTPATIENT
Start: 2022-04-04 | End: 2022-04-04 | Stop reason: HOSPADM

## 2022-04-04 RX ORDER — FAMOTIDINE 10 MG/ML
20 INJECTION, SOLUTION INTRAVENOUS ONCE
Status: COMPLETED | OUTPATIENT
Start: 2022-04-04 | End: 2022-04-04

## 2022-04-04 RX ORDER — SODIUM CHLORIDE, SODIUM LACTATE, POTASSIUM CHLORIDE, CALCIUM CHLORIDE 600; 310; 30; 20 MG/100ML; MG/100ML; MG/100ML; MG/100ML
9 INJECTION, SOLUTION INTRAVENOUS CONTINUOUS
Status: DISCONTINUED | OUTPATIENT
Start: 2022-04-04 | End: 2022-04-04 | Stop reason: HOSPADM

## 2022-04-04 RX ORDER — LABETALOL HYDROCHLORIDE 5 MG/ML
5 INJECTION, SOLUTION INTRAVENOUS
Status: DISCONTINUED | OUTPATIENT
Start: 2022-04-04 | End: 2022-04-04 | Stop reason: HOSPADM

## 2022-04-04 RX ORDER — PROPOFOL 10 MG/ML
VIAL (ML) INTRAVENOUS AS NEEDED
Status: DISCONTINUED | OUTPATIENT
Start: 2022-04-04 | End: 2022-04-04 | Stop reason: SURG

## 2022-04-04 RX ORDER — DIPHENHYDRAMINE HYDROCHLORIDE 50 MG/ML
12.5 INJECTION INTRAMUSCULAR; INTRAVENOUS
Status: DISCONTINUED | OUTPATIENT
Start: 2022-04-04 | End: 2022-04-04 | Stop reason: HOSPADM

## 2022-04-04 RX ORDER — CEFAZOLIN SODIUM 2 G/100ML
2 INJECTION, SOLUTION INTRAVENOUS ONCE
Status: COMPLETED | OUTPATIENT
Start: 2022-04-04 | End: 2022-04-04

## 2022-04-04 RX ORDER — DIPHENHYDRAMINE HCL 25 MG
25 CAPSULE ORAL
Status: DISCONTINUED | OUTPATIENT
Start: 2022-04-04 | End: 2022-04-04 | Stop reason: HOSPADM

## 2022-04-04 RX ORDER — EPHEDRINE SULFATE 50 MG/ML
5 INJECTION, SOLUTION INTRAVENOUS ONCE AS NEEDED
Status: DISCONTINUED | OUTPATIENT
Start: 2022-04-04 | End: 2022-04-04 | Stop reason: HOSPADM

## 2022-04-04 RX ORDER — IBUPROFEN 600 MG/1
600 TABLET ORAL ONCE AS NEEDED
Status: DISCONTINUED | OUTPATIENT
Start: 2022-04-04 | End: 2022-04-04 | Stop reason: HOSPADM

## 2022-04-04 RX ORDER — NALOXONE HCL 0.4 MG/ML
0.2 VIAL (ML) INJECTION AS NEEDED
Status: DISCONTINUED | OUTPATIENT
Start: 2022-04-04 | End: 2022-04-04 | Stop reason: HOSPADM

## 2022-04-04 RX ORDER — SODIUM CHLORIDE 0.9 % (FLUSH) 0.9 %
10 SYRINGE (ML) INJECTION AS NEEDED
Status: DISCONTINUED | OUTPATIENT
Start: 2022-04-04 | End: 2022-04-04 | Stop reason: HOSPADM

## 2022-04-04 RX ORDER — HYDROMORPHONE HYDROCHLORIDE 1 MG/ML
0.5 INJECTION, SOLUTION INTRAMUSCULAR; INTRAVENOUS; SUBCUTANEOUS
Status: ACTIVE | OUTPATIENT
Start: 2022-04-04 | End: 2022-04-04

## 2022-04-04 RX ORDER — GLYCOPYRROLATE 0.2 MG/ML
INJECTION INTRAMUSCULAR; INTRAVENOUS AS NEEDED
Status: DISCONTINUED | OUTPATIENT
Start: 2022-04-04 | End: 2022-04-04 | Stop reason: SURG

## 2022-04-04 RX ADMIN — TRANEXAMIC ACID 1000 MG: 1 INJECTION, SOLUTION INTRAVENOUS at 10:26

## 2022-04-04 RX ADMIN — CEFAZOLIN SODIUM 2 G: 2 INJECTION, SOLUTION INTRAVENOUS at 10:03

## 2022-04-04 RX ADMIN — PROPOFOL 50 MG: 10 INJECTION, EMULSION INTRAVENOUS at 10:45

## 2022-04-04 RX ADMIN — OXYCODONE 5 MG: 5 TABLET ORAL at 07:39

## 2022-04-04 RX ADMIN — FENTANYL CITRATE 25 MCG: 0.05 INJECTION, SOLUTION INTRAMUSCULAR; INTRAVENOUS at 10:38

## 2022-04-04 RX ADMIN — ACETAMINOPHEN 1000 MG: 500 TABLET ORAL at 07:39

## 2022-04-04 RX ADMIN — FAMOTIDINE 20 MG: 10 INJECTION INTRAVENOUS at 08:41

## 2022-04-04 RX ADMIN — FENTANYL CITRATE 50 MCG: 0.05 INJECTION, SOLUTION INTRAMUSCULAR; INTRAVENOUS at 10:33

## 2022-04-04 RX ADMIN — SODIUM CHLORIDE, POTASSIUM CHLORIDE, SODIUM LACTATE AND CALCIUM CHLORIDE: 600; 310; 30; 20 INJECTION, SOLUTION INTRAVENOUS at 11:33

## 2022-04-04 RX ADMIN — FENTANYL CITRATE 50 MCG: 50 INJECTION INTRAMUSCULAR; INTRAVENOUS at 12:04

## 2022-04-04 RX ADMIN — FENTANYL CITRATE 25 MCG: 0.05 INJECTION, SOLUTION INTRAMUSCULAR; INTRAVENOUS at 10:51

## 2022-04-04 RX ADMIN — CELECOXIB 200 MG: 200 CAPSULE ORAL at 07:39

## 2022-04-04 RX ADMIN — DEXAMETHASONE SODIUM PHOSPHATE 6 MG: 10 INJECTION INTRAMUSCULAR; INTRAVENOUS at 10:22

## 2022-04-04 RX ADMIN — SODIUM CHLORIDE, POTASSIUM CHLORIDE, SODIUM LACTATE AND CALCIUM CHLORIDE 9 ML/HR: 600; 310; 30; 20 INJECTION, SOLUTION INTRAVENOUS at 08:41

## 2022-04-04 RX ADMIN — MIDAZOLAM 1 MG: 1 INJECTION INTRAMUSCULAR; INTRAVENOUS at 08:46

## 2022-04-04 RX ADMIN — LIDOCAINE HYDROCHLORIDE 80 MG: 20 INJECTION, SOLUTION INFILTRATION; PERINEURAL at 10:18

## 2022-04-04 RX ADMIN — PROPOFOL 200 MG: 10 INJECTION, EMULSION INTRAVENOUS at 10:18

## 2022-04-04 RX ADMIN — FENTANYL CITRATE 50 MCG: 0.05 INJECTION, SOLUTION INTRAMUSCULAR; INTRAVENOUS at 11:04

## 2022-04-04 RX ADMIN — ROCURONIUM BROMIDE 40 MG: 50 INJECTION INTRAVENOUS at 10:18

## 2022-04-04 RX ADMIN — GLYCOPYRROLATE 0.2 MG: 0.2 INJECTION INTRAMUSCULAR; INTRAVENOUS at 10:18

## 2022-04-04 RX ADMIN — ONDANSETRON 4 MG: 2 INJECTION INTRAMUSCULAR; INTRAVENOUS at 11:14

## 2022-04-04 RX ADMIN — SUGAMMADEX 200 MG: 100 INJECTION, SOLUTION INTRAVENOUS at 11:33

## 2022-04-04 RX ADMIN — OXYCODONE HYDROCHLORIDE AND ACETAMINOPHEN 1 TABLET: 5; 325 TABLET ORAL at 13:05

## 2022-04-04 NOTE — ANESTHESIA PROCEDURE NOTES
Airway  Urgency: elective    Date/Time: 4/4/2022 10:20 AM  Airway not difficult    General Information and Staff    Patient location during procedure: OR  Anesthesiologist: Josue Powell MD  CRNA: Liss Goldstein CRNA    Indications and Patient Condition  Indications for airway management: airway protection    Preoxygenated: yes  MILS not maintained throughout  Mask difficulty assessment: 1 - vent by mask    Final Airway Details  Final airway type: endotracheal airway      Successful airway: ETT  Cuffed: yes   Successful intubation technique: direct laryngoscopy  Facilitating devices/methods: intubating stylet  Blade: Cindy  Blade size: 4  ETT size (mm): 7.0  Cormack-Lehane Classification: grade IIa - partial view of glottis  Placement verified by: chest auscultation and capnometry   Measured from: gums  ETT/EBT to gums (cm): 21  Number of attempts at approach: 1  Assessment: lips, teeth, and gum same as pre-op and atraumatic intubation

## 2022-04-04 NOTE — THERAPY EVALUATION
"Patient Name: Shanika Velez  : 1960    MRN: [id number]                              Today's Date: 2022       Admit Date: 2022    Visit Dx:     ICD-10-CM ICD-9-CM   1. Status post knee replacement, unspecified laterality  Z96.659 V43.65     Patient Active Problem List   Diagnosis   • Dyslipidemia   • Hypothyroidism   • Type 2 diabetes mellitus (HCC)   • Vitamin D deficiency   • Atopic rhinitis   • Asthma   • Osteoarthritis   • GERD (gastroesophageal reflux disease)   • Diverticulosis   • Hyperlipidemia   • Inflammatory bowel disease   • Tibial plateau fracture, right, closed, initial encounter     Past Medical History:   Diagnosis Date   • Arthritis    • Asthma    • Diverticulosis     \"I've had it 7-8 years, since my last colonoscopy   • GERD (gastroesophageal reflux disease)    • History of COVID-19 08/15/2021    COUGH, FEVER   • Hyperlipidemia    • Hypothyroidism    • PONV (postoperative nausea and vomiting)    • Type 2 diabetes mellitus (HCC)    • Vitamin D deficiency      Past Surgical History:   Procedure Laterality Date   • APPENDECTOMY     • CHOLECYSTECTOMY  UNKNOWN   • TIBIAL PLATEAU OPEN REDUCTION INTERNAL FIXATION Right 2021    Procedure: TIBIAL PLATEAU OPEN REDUCTION INTERNAL FIXATION;  Surgeon: Eitan Tomlinson II, MD;  Location: [facility];  Service: Orthopedics;  Laterality: Right;      General Information     Row Name 22 163          Physical Therapy Time and Intention    Document Type evaluation  -     Mode of Treatment individual therapy;physical therapy  -     Row Name 22 163          General Information    Patient Profile Reviewed yes  -     Prior Level of Function independent:;gait;transfer;bed mobility  -     Existing Precautions/Restrictions fall  -     Barriers to Rehab none identified  -     Row Name 22 163          Living Environment    People in Home significant other  -     Row Name 22 163          Home Main " Entrance    Number of Stairs, Main Entrance other (see comments)  13  -     Row Name 04/04/22 1630          Stairs Within Home, Primary    Number of Stairs, Within Home, Primary none  -     Row Name 04/04/22 1630          Cognition    Orientation Status (Cognition) oriented x 4  -     Row Name 04/04/22 1630          Safety Issues, Functional Mobility    Impairments Affecting Function (Mobility) balance;endurance/activity tolerance;strength;range of motion (ROM);pain  -           User Key  (r) = Recorded By, (t) = Taken By, (c) = Cosigned By    Initials Name Provider Type     Brittni Head Physical Therapist               Mobility     Row Name 04/04/22 1633          Bed Mobility    Bed Mobility supine-sit;sit-supine  -     Supine-Sit [place] (Bed Mobility) standby assist;verbal cues  [facility]     Sit-Supine [place] (Bed Mobility) not tested  [facility]  -     Assistive Device (Bed Mobility) head of bed elevated  -     Row Name 04/04/22 1633          Sit-Stand Transfer    Sit-Stand [place] (Transfers) contact guard;verbal cues;1 person assist  -     Assistive Device (Sit-Stand Transfers) walker, front-wheeled  -[facility] Name 04/04/22 1633          Gait/Stairs (Locomotion)    [place] Level (Gait) verbal cues;contact guard;1 person assist  -     Assistive Device (Gait) walker, front-wheeled  [facility]     Distance in Feet (Gait) 80ft  -     Deviations/Abnormal Patterns (Gait) antalgic;gretchen decreased;gait speed decreased;stride length decreased;weight shifting decreased  -     Bilateral Gait Deviations forward flexed posture;heel strike decreased  -     Right Sided Gait Deviations foot drop/toe drag  -     [place] Level (Stairs) not tested  [facility]     Comment, (Gait/Stairs) Tolerated gait well, overall steadiness and safety, but no R DF at all, dragging toes on floor, pt wanting to attempt steps as she has 13 CHIOMA, but unsafe to do today 2/2 foot drop  -[facility] Name 04/04/22 1633           Mobility    Extremity Weight-bearing Status right lower extremity  -     Right Lower Extremity (Weight-bearing Status) weight-bearing as tolerated (WBAT)  -           User Key  (r) = Recorded By, (t) = Taken By, (c) = Cosigned By    Initials Name Provider Type     Brittni Head Physical Therapist               Obj/Interventions     [facility] Name 04/04/22 1643          Range of Motion Comprehensive    General Range of Motion lower extremity range of motion deficits identified  -     Comment, General Range of Motion Expected post-op ROM deficits  -[facility] Name 04/04/22 1643          Strength Comprehensive (MMT)    General Manual Muscle Testing (MMT) Assessment lower extremity strength deficits identified  -     Comment, General Manual Muscle Testing (MMT) Assessment Expected post-op strength deficits, BLE grossly 4/5, except R ankle 0/5  -[facility] Name 04/04/22 1643          Motor Skills    Therapeutic Exercise --  10 reps TKA protocol  -BH     Row Name 04/04/22 1643          Balance    Balance Assessment sitting static balance;sitting dynamic balance;standing dynamic balance;standing static balance  -     Static Sitting Balance standby assist  -     Dynamic Sitting Balance standby assist  -     Position, Sitting Balance sitting edge of bed  -     Static Standing Balance standby assist  -     Dynamic Standing Balance contact guard  -     Position/Device Used, Standing Balance walker, front-wheeled  -[facility] Name 04/04/22 1643          Sensory Assessment (Somatosensory)    Sensory Assessment (Somatosensory) LE sensation intact  Intact except some numbness noted R inner lower leg  -           User Key  (r) = Recorded By, (t) = Taken By, (c) = Cosigned By    Initials Name Provider Type     Brittni Head Physical Therapist               Goals/Plan     [facility] Name 04/04/22 8980          Bed Mobility Goal 1 (PT)    Activity/Assistive Device (Bed Mobility Goal 1, PT) bed mobility  activities, all  -     [place] Level/Cues Needed (Bed Mobility Goal 1, PT) modified independence  -BH     Time Frame (Bed Mobility Goal 1, PT) 1 day  -     Row Name 04/04/22 1650          Transfer Goal 1 (PT)    Activity/Assistive Device (Transfer Goal 1, PT) transfers, all  -     [place] Level/Cues Needed (Transfer Goal 1, PT) modified independence  -BH     Time Frame (Transfer Goal 1, PT) 1 day  -     Row Name 04/04/22 1650          Gait Training Goal 1 (PT)    Activity/Assistive Device (Gait Training Goal 1, PT) gait (walking locomotion)  -     [place] Level (Gait Training Goal 1, PT) modified independence  -     Distance (Gait Training Goal 1, PT) 150ft  -     Time Frame (Gait Training Goal 1, PT) 1 week  -     Row Name 04/04/22 1650          Stairs Goal 1 (PT)    Activity/Assistive Device (Stairs Goal 1, PT) stairs, all skills  -     [place] Level/Cues Needed (Stairs Goal 1, PT) supervision required  -     Number of Stairs (Stairs Goal 1, PT) 10  -BH     Time Frame (Stairs Goal 1, PT) 1 week  -           User Key  (r) = Recorded By, (t) = Taken By, (c) = Cosigned By    Initials Name Provider Type     Brittni Head Physical Therapist               Clinical Impression     Row Name 04/04/22 0221          Pain    Pretreatment Pain Rating 0/10 - no pain  -     Posttreatment Pain Rating 2/10  -     Pain Location - Side/Orientation Right  -     Pain Location incisional  -     Pain Location - knee  -     Pain Intervention(s) Cold applied;Repositioned;Ambulation/increased activity  -     Row Name 04/04/22 5525          Plan of Care Review    Plan of Care Reviewed With patient;significant other  -     Outcome Evaluation Pt is a 62 yo F POD 0 R TKA. Pt lives with her significant other, reports independence at BL with no AD but has a rwx, and has 13 CHIOMA. Pt presents to PT with impaired strength, endurance, and pain limiting overall mobility. Pt completed bed  mobility with SBA, STS with SBA, and ambulated 80ft with CGA and rwx. Pt with little to no DF on R foot, dragging her toes on the ground with gait. Pt concerned about the 13 CHIOMA her home, but unsafe to attempt steps with toe drag. Pt may benefit from 1 night in hopsital to assess improved DF in order to attempt stairs and ensure safety with D/C home. Pt completed toileting tasks without assist and completed STS from commode with CGA. Pt agreeable to sitting UIC and tolerated LE exercises well, mild c/o pain with exercise. Pt will continue to benefit from skilled PT to address functional deficits and improve overall mobility. PT recommending D/C home with HHPT.  -     Row Name 04/04/22 9120          Therapy Assessment/Plan (PT)    Patient/Family Therapy Goals Statement (PT) [facility] to [facility]  -     Rehab Potential (PT) good, to achieve stated therapy goals  -     Criteria for Skilled Interventions Met (PT) yes  -     Row Name 04/04/22 5891          Positioning and Restraints    Pre-Treatment Position in bed  -     Post Treatment Position chair  -     In Chair reclined;call light within reach;encouraged to call for assist;with family/caregiver;with [facility]  -           User Key  (r) = Recorded By, (t) = Taken By, (c) = Cosigned By    Initials Name Provider Type    Brittni Vincent Physical Therapist               Outcome Measures     Row Name 04/04/22 3233          How much help from another person do you currently need...    Turning from your back to your side while in flat bed without using bedrails? 3  -BH     Moving from lying on back to sitting on the side of a flat bed without bedrails? 3  -BH     Moving to and from a bed to a chair (including a wheelchair)? 3  -BH     Standing up from a chair using your arms (e.g., wheelchair, bedside chair)? 3  -BH     Climbing 3-5 steps with a railing? 2  -BH     To walk in hospital room? 3  -BH     AM-PAC 6 Clicks Score (PT) 17  -     Row Name 04/04/22 5868           Functional Assessment    Outcome Measure Options AM-PAC 6 Clicks Basic Mobility (PT)  -           User Key  (r) = Recorded By, (t) = Taken By, (c) = Cosigned By    Initials Name Provider Type     Brittni Head Physical Therapist                             Physical Therapy Education                 Title: PT OT SLP Therapies (Done)     Topic: Physical Therapy (Done)     Point: Mobility training (Done)     Learning Progress Summary           Patient Acceptance, E,TB,D, VU,NR by  at 4/4/2022 1651                   Point: Home exercise program (Done)     Learning Progress Summary           Patient Acceptance, E,TB,D, VU,NR by  at 4/4/2022 1651                   Point: Body mechanics (Done)     Learning Progress Summary           Patient Acceptance, E,TB,D, VU,NR by  at 4/4/2022 1651                   Point: Precautions (Done)     Learning Progress Summary           Patient Acceptance, E,TB,D, VU,NR by  at 4/4/2022 1651                               User Key     Initials Effective Dates Name Provider Type Discipline     05/10/21 -  Brittni Head Physical Therapist PT              PT Recommendation and Plan  Planned Therapy Interventions (PT): balance training, bed mobility training, gait training, home exercise program, patient/family education, stair training, strengthening, transfer training  Plan of Care Reviewed With: patient, significant other  Outcome Evaluation: Pt is a 60 yo F POD 0 R TKA. Pt lives with her significant other, reports independence at BL with no AD but has a rwx, and has 13 CHIOMA. Pt presents to PT with impaired strength, endurance, and pain limiting overall mobility. Pt completed bed mobility with SBA, STS with SBA, and ambulated 80ft with CGA and rwx. Pt with little to no DF on R foot, dragging her toes on the ground with gait. Pt concerned about the 13 CHIOMA her home, but unsafe to attempt steps with toe drag. Pt may benefit from 1 night in hopsital to assess improved DF in order  to attempt stairs and ensure safety with D/C home. Pt completed toileting tasks without assist and completed STS from commode with CGA. Pt agreeable to sitting UIC and tolerated LE exercises well, mild c/o pain with exercise. Pt will continue to benefit from skilled PT to address functional deficits and improve overall mobility. PT recommending D/C home with HHPT.     Time Calculation:    PT Charges     Row Name 04/04/22 1651             Time Calculation    Start Time 1455  -      Stop Time 1523  -      Time Calculation (min) 28 min  -      PT Received On 04/04/22  -      PT - Next Appointment 04/05/22  -      PT Goal Re-Cert Due Date 04/11/22  -              Time Calculation- PT    Total Timed Code Minutes- PT 25 minute(s)  -              Timed Charges    96270 - PT Therapeutic Exercise Minutes 5  -      47315 - Gait Training Minutes  15  -      70377 - PT Therapeutic Activity Minutes 5  -              Untimed Charges    PT Eval/Re-eval Minutes 5  -              Total Minutes    Timed Charges Total Minutes 25  -      Untimed Charges Total Minutes 5  -       Total Minutes 30  -BH            User Key  (r) = Recorded By, (t) = Taken By, (c) = Cosigned By    Initials Name Provider Type     Brittni Head Physical Therapist              Therapy Charges for Today     Code Description Service Date Service Provider Modifiers Qty    95806305306 HC GAIT TRAINING EA 15 MIN 4/4/2022 Brittni Head GP 1    16758616473 HC PT THER PROC EA 15 MIN 4/4/2022 Brittni Head GP 1    60174272698 HC PT EVAL LOW COMPLEXITY 3 4/4/2022 Brittni Head GP 1          PT G-Codes  Outcome Measure Options: AM-PAC 6 Clicks Basic Mobility (PT)  AM-PAC 6 Clicks Score (PT): 17    BRITTNI HEAD  4/4/2022

## 2022-04-04 NOTE — DISCHARGE PLACEMENT REQUEST
"Shanika Velez (61 y.o. Female)             Date of Birth   1960    Social Security Number       Address   [address] [place] [address]    Home Phone   [phone]    MRN   [id number]       Confucianism   None    Marital Status   Single                            Admission Date   4/4/22    Admission Type   Elective    Admitting Provider   Eitan Tomlinson II, MD    Attending Provider   Eitan Tomlinson II, MD    Department, Room/Bed   [facility] MAIN OR, [facility] Main OR/MAIN OR       Discharge Date       Discharge Disposition       Discharge Destination                               Attending Provider: Eitan Tomlinson II, MD    Allergies: Codeine, Fluoxetine Hcl, Penicillins, Tetracycline    Isolation: None   Infection: None   Code Status: Prior   Advance Care Planning Activity    Ht: 175.3 cm (69\")   Wt: 87.7 kg (193 lb 5.5 oz)    Admission Cmt: None   Principal Problem: None                Active Insurance as of 4/4/2022     Primary Coverage     Payor Plan Insurance Group Employer/Plan Group    [employer] BLUE CROSS [employer] BLUE CROSS BLUE SHIELD PPO [id number]     Payor Plan Address Payor Plan Phone Number Payor Plan Fax Number Effective Dates    [address] [phone]  1/1/2018 - None Entered    [place] [address]       Subscriber Name Subscriber Birth Date Member ID       SHANIKA VELEZ 1960 [id number]                 Emergency Contacts      (Rel.) Home Phone Work Phone Mobile Phone    Layla Ochoa (Sister) [phone] -- --    Erika Adams (Significant Other) -- -- [phone]        "

## 2022-04-04 NOTE — H&P
Orthopaedic Surgery  History & Physical For Elective Total Knee  Dr. DAMION Tomlinson II  [phone]    HPI:  Patient is a 61 y.o. Not  or  female who presents with End-stage arthritis of the right knee. They failed conservative treatment of their knee pain and a thorough discussion of the risks and benefits of surgery was had. The patient wishes to continue with elective total knee replacement, they were scheduled and are here for surgery. They did get medical clearance as well as a thorough preoperative workup.    MEDICAL HISTORY  Past Medical History:   Diagnosis Date   • Arthritis    • Asthma    • GERD (gastroesophageal reflux disease)    • History of COVID-19 08/15/2021    COUGH, FEVER   • Hyperlipidemia    • Hypothyroidism    • PONV (postoperative nausea and vomiting)    • Type 2 diabetes mellitus (HCC)    • Vitamin D deficiency    ·   Past Surgical History:   Procedure Laterality Date   • APPENDECTOMY     • CHOLECYSTECTOMY  UNKNOWN   • TIBIAL PLATEAU OPEN REDUCTION INTERNAL FIXATION Right 9/28/2021    Procedure: TIBIAL PLATEAU OPEN REDUCTION INTERNAL FIXATION;  Surgeon: Eitan Tomlinson II, MD;  Location: [facility];  Service: Orthopedics;  Laterality: Right;   ·   Prior to Admission medications    Medication Sig Start Date End Date Taking? Authorizing Provider   ACCU-CHEK FASTCLIX LANCETS misc Test 2 times daily 2/26/18   Parth Jo MD   albuterol sulfate  (90 Base) MCG/ACT inhaler Inhale 2 puffs Every 4 (Four) Hours As Needed for Wheezing.    ProviderEnrico MD   aspirin 81 MG EC tablet Take 1 tablet by mouth 2 (Two) Times a Day for 30 days. Start AM the next day following surgery 4/4/22 5/4/22  Eitan Tomlinson II, MD   atorvastatin (LIPITOR) 10 MG tablet Take 10 mg by mouth Daily.    ProviderEnrico MD   Chlorhexidine Gluconate Cloth 2 % pads Apply  topically. PRIOR TO OR    Enrico Castellanos MD   colestipol (COLESTID) 1 g tablet Take 1 g  by mouth As Needed.    Enrico Castellanos MD   docusate sodium (COLACE) 100 MG capsule Take 1 capsule by mouth 2 (Two) Times a Day As Needed for Constipation. 4/3/22   Eitan Tomlinson II, MD   FARXIGA 10 MG tablet 1 tablet every morning by mouth  Patient taking differently: Take 10 mg by mouth Daily. 1 tablet every morning by mouth 9/11/18   Parth Jo MD   fexofenadine (ALLEGRA) 180 MG tablet Take 180 mg by mouth Daily. 1/13/16   Enrico Castellanos MD   fluticasone (FLONASE) 50 MCG/ACT nasal spray 2 sprays into the nostril(s) as directed by provider As Needed. 11/18/13   Enrico Castellanos MD   glucose blood (ACCU-CHEK JACKIE) test strip Test twice daily 2/26/18   Parth Jo MD   levothyroxine (SYNTHROID, LEVOTHROID) 112 MCG tablet Take 112 mcg by mouth Daily.    Enrico Castellanos MD   meloxicam (MOBIC) 7.5 MG tablet Take one tablet by mouth daily  Patient taking differently: Take 7.5 mg by mouth Daily. TO HOLD 1 WEEK PRIOR TO OR 2/26/18   Parth Jo MD   metFORMIN (GLUCOPHAGE) 1000 MG tablet Take 1,000 mg by mouth Every Evening.    Enrico Castellanos MD   montelukast (SINGULAIR) 10 MG tablet Take 1 tablet by mouth Every Night.  Patient taking differently: Take 10 mg by mouth Daily. 2/26/18   Parth Jo MD   mupirocin (BACTROBAN) 2 % nasal ointment into the nostril(s) as directed by provider 2 (Two) Times a Day. PRIOR TO OR    Enrico Castellanos MD   Omeprazole-Sodium Bicarbonate (Zegerid OTC)  MG capsule Take 1 capsule by mouth Daily.    Enrico Castellanos MD   ondansetron (Zofran) 4 MG tablet Take 1 tablet by mouth Every 8 (Eight) Hours As Needed for Nausea or Vomiting for up to 10 doses. 4/3/22   Eitan Tomlinson II, MD   VASCEPETHAN 1 g capsule capsule Take 2 g by mouth 2 (Two) Times a Day With Meals.  Patient taking differently: Take 4 g by mouth Every Night. 2/26/18   Parth Jo MD   vitamin D (ERGOCALCIFEROL) 75542 units capsule capsule  Take 1 capsule by mouth 3 (Three) Times a Week.  Patient taking differently: Take 50,000 Units by mouth 1 (One) Time Per Week. Thursdays 2/26/18   Parth Jo MD   ·   Allergies   Allergen Reactions   • Codeine Hives   • Fluoxetine Hcl Hives   • Penicillins Hives   • Tetracycline Hives   ·   Most Recent Immunizations   Administered Date(s) Administered   • COVID-19 (PFIZER) PURPLE CAP 12/02/2021   ·   Social History     Tobacco Use   • Smoking status: Never Smoker   • Smokeless tobacco: Never Used   Substance Use Topics   • Alcohol use: Not Currently   ·    Social History     Substance and Sexual Activity   Drug Use Never   ·     REVIEW OF SYSTEMS:  · Head: negative for headache  · Respiratory: negative for shortness of breath.   · Cardiovascular: negative for chest pain.   · Gastrointestinal: negative abdominal pain.   · Neurological: negative for LOC  · Psychiatric/Behavioral: negative for memory loss.   · All other systems reviewed and are negative    VITALS: There were no vitals taken for this visit. There is no height or weight on file to calculate BMI.    PHYSICAL EXAM:   · CONSTITUTIONAL: A&Ox3, No acute distress  · LUNGS: Equal chest rise, no shortness of air  · CARDIOVASCULAR: palpable peripheral pulses  · SKIN: no skin lesions in the area examined  · LYMPH: no lymphadenopathy in the area examined  · EXTREMITY: Knee  · Pulses:  Brisk Capillary Refill  · Sensation: Intact to Saphenous, Sural, Deep Peroneal, Superficial Peroneal, and Tibial Nerves and grossly throughout extremity  · Motor: 5/5 EHL/FHL/TA/GS motor complexes    RADIOLOGY REVIEW:   No radiology results for the last 7 days    LABS:   Results for the past 24 hours:   Recent Results (from the past 24 hour(s))   POC Glucose Once    Collection Time: 04/04/22  6:54 AM    Specimen: Blood   Result Value Ref Range    Glucose 122 70 - 130 mg/dL       IMPRESSION:  Patient is a 61 y.o. Not  or  female with end-stage arthritis of the right  knee    PLAN:   · Surgery: Elective total knee arthroplasty  · Consent: The risks and benefits of operative versus nonoperative treatment were discussed. The patient elected to undergo operative treatment of their knee arthritis. The risks discussed included but were not limited to blood clots, MI, stroke, other medical complications, infection, damage to neurovascular structures, continued pain, hardware prominence, loss of range of motion, need for further procedures, and and risk of anesthesia..  No guarantees were made   · Disposition: Elective right Total Knee Arthroplasty today.    Eitan Tomlinson II, MD  Orthopaedic Surgery  [facility]

## 2022-04-04 NOTE — ANESTHESIA POSTPROCEDURE EVALUATION
Patient: Shanika Velez    Procedure Summary     Date: 04/04/22 Room / Location: [facility] OR [address] [place] [address] MAIN OR    Anesthesia Start: 1009 Anesthesia Stop: 1151    Procedure: TOTAL KNEE ARTHROPLASTY WITH ORTHO ROBOT (Right Knee) Diagnosis:     Surgeons: Eitan Tomlinson II, MD Provider: Josue Powell MD    Anesthesia Type: general with block ASA Status: 3          Anesthesia Type: general with block    Vitals  Vitals Value Taken Time   /78 04/04/22 1246   Temp 36.9 °C (98.5 °F) 04/04/22 1149   Pulse 96 04/04/22 1259   Resp 16 04/04/22 1245   SpO2 97 % 04/04/22 1259   Vitals shown include unvalidated device data.        Post Anesthesia Care and Evaluation    Patient location during evaluation: bedside  Patient participation: complete - patient participated  Level of consciousness: awake and alert  Pain management: adequate  Airway patency: patent  Anesthetic complications: No anesthetic complications    Cardiovascular status: acceptable  Respiratory status: acceptable  Hydration status: acceptable    Comments: /86   Pulse 99   Temp 36.9 °C (98.5 °F) (Oral)   Resp 16   Wt 87.7 kg (193 lb 5.5 oz)   SpO2 96%   BMI 28.55 kg/m²

## 2022-04-04 NOTE — ANESTHESIA PREPROCEDURE EVALUATION
Anesthesia Evaluation     history of anesthetic complications: PONV               Airway   Mallampati: I  TM distance: >3 FB  Neck ROM: full  Dental    (+) edentulous    Pulmonary    (+) asthma,  (-) shortness of breath, recent URI, sleep apnea, not a smoker    ROS comment: Hx Covid  Cardiovascular     (+) dysrhythmias (occ flutter), hyperlipidemia,   (-) hypertension, angina      Neuro/Psych  (-) dizziness/light headedness, syncope  GI/Hepatic/Renal/Endo    (+)  GERD,  diabetes mellitus, thyroid problem hypothyroidism  (-) liver disease, no renal disease    Musculoskeletal     Abdominal    Substance History      OB/GYN          Other   arthritis,                      Anesthesia Plan    ASA 3     general with block     intravenous induction     Anesthetic plan, all risks, benefits, and alternatives have been provided, discussed and informed consent has been obtained with: patient.        CODE STATUS:

## 2022-04-04 NOTE — PLAN OF CARE
Goal Outcome Evaluation:  Plan of Care Reviewed With: patient, significant other           Outcome Evaluation: Pt is a 60 yo F POD 0 R TKA. Pt lives with her significant other, reports independence at BL with no AD but has a rwx, and has 13 CHIOMA. Pt presents to PT with impaired strength, endurance, and pain limiting overall mobility. Pt completed bed mobility with SBA, STS with SBA, and ambulated 80ft with CGA and rwx. Pt with little to no DF on R foot, dragging her toes on the ground with gait. Pt concerned about the 13 CHIOMA her home, but unsafe to attempt steps with toe drag. Pt may benefit from 1 night in hopsital to assess improved DF in order to attempt stairs and ensure safety with D/C home. Pt completed toileting tasks without assist and completed STS from commode with CGA. Pt agreeable to sitting UIC and tolerated LE exercises well, mild c/o pain with exercise. Pt will continue to benefit from skilled PT to address functional deficits and improve overall mobility. PT recommending D/C home with HHPT.

## 2022-04-04 NOTE — OP NOTE
ROBOTIC Total Knee Replacement Operative Note  Dr. DAMION Tomlinson II  [phone]    PATIENT NAME: Shanika Velez  MRN: [id number]  : 1960 AGE: 61 y.o. GENDER: female  DATE OF OPERATION: 2022  PREOPERATIVE DIAGNOSIS: End Stage Arthritis  POSTOPERATIVE DIAGNOSIS: Same  OPERATION PERFORMED: Right Robotic Assisted Total Knee Arthroplasty  SURGEON: Eitan Tomlinson MD  Circulator: Xochilt Browne RN; Liss Murillo RN  Scrub Person: Stephanie Whitlock; Betty Ledesma  Vendor Representative: Colin Gomez Ben  Assistant: Twyla Rodríguez PA  ANESTHESIA: General  ASSISTANT: Gris Galeas. This case would not have been possible without another set of skilled surgical hands for retraction, use of instrumentation, and general assistance.  This assistance was vital to the success of the case.   ESTIMATED BLOOD LOSS: 100cc  SPONGE AND NEEDLE COUNT: Correct  INDICATIONS:   A discussion of operative versus nonoperative treatment was had with the patient and they failed conservative management. They elected to undergo total knee arthroplasty. The risks of surgery were discussed and included the risk of anesthesia, infection, damage to neurovascular structures, implant loosening/failure, fracture, hardware prominence, continued pain, early failure, the need for further procedures, medical complications, and others. No guarantees were made. The patient wished to proceed with surgery and a surgical consent was signed.    COMPONENTS:   · Journey II BCS Oxinium Femoral Component: Size 4  · Journey II Tibial Baseplate: 3   · Posterior Stabilized Insert: 12  · Patella: 32mm    PERTINENT FINDINGS: Degenerative Arthritis    DETAILS OF PROCEDURE:  The patient was met in the preoperative area. The site was marked. The consent and H&P were reviewed. The patient was then wheeled back to the operative suite and transferred to the operative table. The patient underwent anesthesia. A tourniquet was placed on the  upper thigh. Surgical alcohol was used to thoroughly clean the entire operative extremity.     The leg was then prepped in the normal sterile fashion and surgical space suits were used for the entire operative team. New outer gloves were used by all sterile surgical team members after final draping. After a surgical timeout, the tourniquet was inflated.     I began by making a lateral incision through her old scar of the tibial plateau approach.  Dissection was carried down to the plate where 6-7 screws were removed easily.  The 7 screw required screw removal kit as it was stripped.    I then proceeded by inserting 2 pins into the tibial shaft and attaching the tibial robotic .    In flexion, a midline knee incision was utilized centered on the patella and ending medial to the tibial tubercle. Dissection was carried down to the knee capsule. A medial parapatellar ararthrotomy was completed. The patellar fat pad was excised. The MCL was minimally elevated to gain adequate exposure to the knee.  The suprapatellar fat pad was also excised and then two femoral pins were placed proximal to the medial femoral condyle and then the femoral robotic  was attached.  I then inserted the femoral and tibial buttons.     The patella was subluxed laterally. The patella was held vertical using 2 clamps, and was then cut using a saw. The patella was then sized, and the lug holes were drilled. Excess patellar bone was removed using a saw. The patella was then protected during this case using the metal patella shield.    The ACL, meniscus, and PCL were then resected.  Next I proceeded with a standard mapping of the knee including all relevant anatomic positions, range of motion, and stressing of ligaments.  I then planned out the knee including implant sizes, rotation, and bony resection to appropriately balance the knee as needed.      After the mapping and planning was complete, I turned my attention to the distal  femur.  Using the bur, I was able to remove the distal femoral bone and create the initial lug holes.  I then used the punch to create the pinholes for the 4-in-1 cutting block.  The 4-in-1 cutting block was then snapped into place and pinned.  I used a saw to resect the anterior posterior and chamfer cuts.  These were all removed using a rongeur.    I then turned my attention to the tibia.  Retractors were placed appropriately.  Using the robot for guidance, a cutting jig was attached to the tibia using 2 pins.  This was done just above the level of measured resection.  I then used a saw to cut the tibia and remove this bone.  At that point, I was able to use the bur to resect down to the actual intended target tibial resection line.  This was verified to be accurate afterwards on the robot screen.    At that point, the remaining meniscus and osteophytes were removed.  I then attached the femoral component which was well-seated.  The box was then prepared for.  I then trialed the knee and was noted to be in excellent balance with good range of motion.    We next turned our attention back to the tibia to finish the tibial preparation. The tibia was measured and sized. The tibial plate was aligned with the rotation from the trialing process and verified to be positioned near the medial third of the tibial tubercle. The tibial surface was then prepared for the keel.  Unfortunately, one of the distal screws from the plate was getting in my way and had to extend the lateral incision and remove the offending screw.  At that point, there was only 1 remaining screw with the plate someone had to remove that and extracted the plate.    The knee was thoroughly irrigated with sterile saline using a pulse-lavage system while the final tibial baseplate, femoral component and patellar component were opened. Cement was prepared and mixed using standard techniques. Outer gloves were changed before implant handling to ensure no soft  "tissue or oily material was exposed to the surfaces of the final implants. The bony knee surfaces were dried and the implants were cemented in place, starting with the tibia, then the femur and finally the patella. Excess cement was removed at each step. A trial poly was utilized during cementation for compression. The tourniquet was taken down and adequate hemostasis was achieved. The knee was thoroughly irrigated once again.     The soft tissues about the knee were then injected with an anesthetic cocktail. Care was taken to avoid the peroneal nerve and the neurovascular bundle posteriorly. The cement was allowed to harden.  While the cement was hardening, I did remove all 4 pins and the tibial and femoral buttons.  The tibial pin sites were closed using surgical glue and Steri-Strips.    After the cement was fully set, the knee was ranged with various thickness of polyethylene trials to ensure that the balance was appropriate after robotic resection. The knee was inspected for excess cement, which was removed. The real poly, of corresponding thickness was then opened and inserted into the knee. One final range of motion and stability test showed the knee to be in good condition with a well tracking patellar component.    The knee capsule was then closed with a running barbed suture. The knee was then closed in layers.  A sterile dressing was applied.    The patient was awoken from anesthesia, moved to the [facility] and taken to the recovery room in stable condition. Sponge and needle count were correct. There were no complications. Patient tolerated the procedure well.    R \"Brennon\" Davi GILLIS MD  Orthopaedic Surgery  [facility]  [phone]                      "

## 2022-04-19 ENCOUNTER — TELEPHONE (OUTPATIENT)
Dept: ORTHOPEDIC SURGERY | Facility: HOSPITAL | Age: 62
End: 2022-04-19

## 2022-04-19 NOTE — TELEPHONE ENCOUNTER
Called and spoke with Ms. Velez to see how she is doing as she is 2 weeks SP RTK. She said she is doing well. She is working with OP PT and yesterday they removed the dressing and zipline. She has a couple scabs and a tiny open spot at the bottom of the incision, but it isn’t draining or anything. She did question a shower now that the incision isn’t covered. I advised if she has any open area at all not to get it wet. She voiced understanding. She still has some swelling but said it is slowly getting better. She continues with ice and elevation. The pain is getting better it’s more sore than anything, especially after therapy. Ms. Velez doesn’t have any questions for me at this time. She said things seem to be going as expected. She has my contact information should she need anything.

## 2023-09-02 NOTE — PLAN OF CARE
Goal Outcome Evaluation:  Plan of Care Reviewed With: patient        Progress: improving  Outcome Summary: Pt tolerated treatment well this date. Ambulated 80ft x2 w/ walker and SV. Occasional standing rest break needed, then seated rest break taken half-way d/t UE fatigue. Instructed pt through LE exercises, and encouraged her to ambulate more this evening.   Treated for covid

## (undated) DEVICE — EVOS 3.5MM X 75MM CORTEX SCREW SELF-TAPPING
Type: IMPLANTABLE DEVICE | Site: TIBIA | Status: NON-FUNCTIONAL
Brand: EVOS
Removed: 2021-09-28

## (undated) DEVICE — PK ORTHO MAJ 40

## (undated) DEVICE — BNDG ELAS ELITE V/CLOSE 4IN 5YD LF STRL

## (undated) DEVICE — GLV SURG SENSICARE PI MIC PF SZ7 LF STRL

## (undated) DEVICE — EVOS SMALL 2.5MM DRILL W/AO QC SHORT: Brand: EVOS

## (undated) DEVICE — 2108 SERIES SAGITTAL BLADE, NO OFFSET  (12.4 X 1.19 X 82.1MM)

## (undated) DEVICE — UNDERCAST PADDING: Brand: DEROYAL

## (undated) DEVICE — PICO 7 10CM X 30CM: Brand: PICO™ 7

## (undated) DEVICE — DRSNG GZ CURAD XEROFORM NONADHS 5X9IN STRL

## (undated) DEVICE — COVER,MAYO STAND,STERILE: Brand: MEDLINE

## (undated) DEVICE — GAUZE,SPONGE,FLUFF,6"X6.75",STRL,10/TRAY: Brand: MEDLINE

## (undated) DEVICE — BNDG ESMARK STRL 6INX12FT LF

## (undated) DEVICE — EVOS SMALL 2.5MM DRILL W/AO QC LONG: Brand: EVOS

## (undated) DEVICE — GLV SURG BIOGEL LTX PF 7

## (undated) DEVICE — GLV SURG PREMIERPRO ORTHO LTX PF SZ8.5 BRN

## (undated) DEVICE — EVOS 3.5MM X 38MM LOCKING SCREW SELF-TAPPING
Type: IMPLANTABLE DEVICE | Site: TIBIA | Status: NON-FUNCTIONAL
Brand: EVOS
Removed: 2021-09-28

## (undated) DEVICE — NEEDLE, QUINCKE, 20GX3.5": Brand: MEDLINE

## (undated) DEVICE — GLV SURG SIGNATURE ESSENTIAL PF LTX SZ8.5

## (undated) DEVICE — TRAP FLD MINIVAC MEGADYNE 100ML

## (undated) DEVICE — TBG PENCL TELESCP MEGADYNE SMOKE EVAC 10FT

## (undated) DEVICE — 450 ML BOTTLE OF 0.05% CHLORHEXIDINE GLUCONATE IN 99.95% STERILE WATER FOR IRRIGATION, USP AND APPLICATOR.: Brand: IRRISEPT ANTIMICROBIAL WOUND LAVAGE

## (undated) DEVICE — ANTIBACTERIAL UNDYED BRAIDED (POLYGLACTIN 910), SYNTHETIC ABSORBABLE SUTURE: Brand: COATED VICRYL

## (undated) DEVICE — DRAPE,REIN 53X77,STERILE: Brand: MEDLINE

## (undated) DEVICE — EVOS 3.5MM X 38MM CORTEX SCREW SELF-TAPPING
Type: IMPLANTABLE DEVICE | Site: TIBIA | Status: NON-FUNCTIONAL
Brand: EVOS
Removed: 2021-09-28

## (undated) DEVICE — PERI-LOC K-WIRE 1.6MM X 150MM                                    LENGTH TROCAR POINT
Type: IMPLANTABLE DEVICE | Site: TIBIA | Status: NON-FUNCTIONAL
Brand: PERI-LOC
Removed: 2021-09-28

## (undated) DEVICE — DRP C/ARM 41X74IN

## (undated) DEVICE — DECANTER BAG 9": Brand: MEDLINE INDUSTRIES, INC.

## (undated) DEVICE — APPL CHLORAPREP HI/LITE 26ML ORNG

## (undated) DEVICE — PERI-LOC K-WIRE 2.0MM X 150MM                                    LENGTH TROCAR POINT
Type: IMPLANTABLE DEVICE | Site: TIBIA | Status: NON-FUNCTIONAL
Brand: PERI-LOC
Removed: 2021-09-28

## (undated) DEVICE — STPLR SKIN VISISTAT WD 35CT

## (undated) DEVICE — SUT ETHLN 2/0 PS 18IN 585H

## (undated) DEVICE — 3M™ IOBAN™ 2 ANTIMICROBIAL INCISE DRAPE 6650EZ: Brand: IOBAN™ 2

## (undated) DEVICE — DRP C/ARMOR

## (undated) DEVICE — SOL ISO/ALC RUB 70PCT 4OZ

## (undated) DEVICE — STERILE PATIENT PROTECTIVE PAD FOR IMP® KNEE POSITIONERS & COHESIVE WRAP (10 / CASE): Brand: DE MAYO KNEE POSITIONER®

## (undated) DEVICE — PENCL E/S ULTRAVAC TELESCP NOSE HOLSTR 10FT

## (undated) DEVICE — T-DRAPE,EXTREMITY,STERILE: Brand: MEDLINE

## (undated) DEVICE — DISPOSABLE TOURNIQUET CUFF SINGLE BLADDER, SINGLE PORT AND QUICK CONNECT CONNECTOR: Brand: COLOR CUFF

## (undated) DEVICE — SYR LUERLOK 20CC BX/50

## (undated) DEVICE — DUAL CUT SAGITTAL BLADE

## (undated) DEVICE — PK KN TOTL 40

## (undated) DEVICE — SOL NACL 0.9PCT 1000ML